# Patient Record
Sex: FEMALE | Race: WHITE | Employment: OTHER | RURAL
[De-identification: names, ages, dates, MRNs, and addresses within clinical notes are randomized per-mention and may not be internally consistent; named-entity substitution may affect disease eponyms.]

---

## 2017-01-16 RX ORDER — LEVOFLOXACIN 500 MG/1
TABLET, FILM COATED ORAL
Qty: 7 TABLET | Refills: 0 | Status: SHIPPED | OUTPATIENT
Start: 2017-01-16 | End: 2017-03-13

## 2017-03-13 ENCOUNTER — OFFICE VISIT (OUTPATIENT)
Dept: FAMILY MEDICINE CLINIC | Age: 82
End: 2017-03-13
Payer: MEDICARE

## 2017-03-13 VITALS
DIASTOLIC BLOOD PRESSURE: 64 MMHG | BODY MASS INDEX: 34.55 KG/M2 | HEIGHT: 60 IN | WEIGHT: 176 LBS | SYSTOLIC BLOOD PRESSURE: 120 MMHG

## 2017-03-13 DIAGNOSIS — L03.119 CELLULITIS OF UPPER ARM AND FOREARM: Primary | ICD-10-CM

## 2017-03-13 DIAGNOSIS — L29.9 ITCHING: ICD-10-CM

## 2017-03-13 PROCEDURE — 1036F TOBACCO NON-USER: CPT | Performed by: NURSE PRACTITIONER

## 2017-03-13 PROCEDURE — 4040F PNEUMOC VAC/ADMIN/RCVD: CPT | Performed by: NURSE PRACTITIONER

## 2017-03-13 PROCEDURE — G8484 FLU IMMUNIZE NO ADMIN: HCPCS | Performed by: NURSE PRACTITIONER

## 2017-03-13 PROCEDURE — 99213 OFFICE O/P EST LOW 20 MIN: CPT | Performed by: NURSE PRACTITIONER

## 2017-03-13 PROCEDURE — 1123F ACP DISCUSS/DSCN MKR DOCD: CPT | Performed by: NURSE PRACTITIONER

## 2017-03-13 PROCEDURE — 1090F PRES/ABSN URINE INCON ASSESS: CPT | Performed by: NURSE PRACTITIONER

## 2017-03-13 PROCEDURE — G8427 DOCREV CUR MEDS BY ELIG CLIN: HCPCS | Performed by: NURSE PRACTITIONER

## 2017-03-13 PROCEDURE — G8419 CALC BMI OUT NRM PARAM NOF/U: HCPCS | Performed by: NURSE PRACTITIONER

## 2017-03-13 PROCEDURE — 96372 THER/PROPH/DIAG INJ SC/IM: CPT | Performed by: NURSE PRACTITIONER

## 2017-03-13 RX ORDER — TRIAMCINOLONE ACETONIDE 40 MG/ML
80 INJECTION, SUSPENSION INTRA-ARTICULAR; INTRAMUSCULAR ONCE
Status: COMPLETED | OUTPATIENT
Start: 2017-03-13 | End: 2017-03-13

## 2017-03-13 RX ORDER — CEFTRIAXONE 1 G/1
1 INJECTION, POWDER, FOR SOLUTION INTRAMUSCULAR; INTRAVENOUS ONCE
Status: COMPLETED | OUTPATIENT
Start: 2017-03-13 | End: 2017-03-13

## 2017-03-13 RX ORDER — CEFUROXIME AXETIL 500 MG/1
500 TABLET ORAL 2 TIMES DAILY
Qty: 20 TABLET | Refills: 0 | Status: SHIPPED | OUTPATIENT
Start: 2017-03-13 | End: 2017-04-19 | Stop reason: SDUPTHER

## 2017-03-13 RX ADMIN — CEFTRIAXONE 1 G: 1 INJECTION, POWDER, FOR SOLUTION INTRAMUSCULAR; INTRAVENOUS at 11:15

## 2017-03-13 RX ADMIN — TRIAMCINOLONE ACETONIDE 80 MG: 40 INJECTION, SUSPENSION INTRA-ARTICULAR; INTRAMUSCULAR at 11:15

## 2017-03-13 ASSESSMENT — ENCOUNTER SYMPTOMS
ORTHOPNEA: 0
COUGH: 0
DIARRHEA: 0
SPUTUM PRODUCTION: 0
RESPIRATORY NEGATIVE: 1
SHORTNESS OF BREATH: 0
EYES NEGATIVE: 1
BACK PAIN: 0
NAIL CHANGES: 0
GASTROINTESTINAL NEGATIVE: 1
RHINORRHEA: 0
SORE THROAT: 0
HEMOPTYSIS: 0

## 2017-03-17 ENCOUNTER — OFFICE VISIT (OUTPATIENT)
Dept: FAMILY MEDICINE CLINIC | Age: 82
End: 2017-03-17
Payer: MEDICARE

## 2017-03-17 VITALS
SYSTOLIC BLOOD PRESSURE: 136 MMHG | BODY MASS INDEX: 35.14 KG/M2 | DIASTOLIC BLOOD PRESSURE: 70 MMHG | WEIGHT: 179 LBS | HEIGHT: 60 IN

## 2017-03-17 DIAGNOSIS — L85.3 DRY SKIN: ICD-10-CM

## 2017-03-17 DIAGNOSIS — L03.119 CELLULITIS OF UPPER EXTREMITY, UNSPECIFIED LATERALITY: Primary | ICD-10-CM

## 2017-03-17 PROCEDURE — 1090F PRES/ABSN URINE INCON ASSESS: CPT | Performed by: NURSE PRACTITIONER

## 2017-03-17 PROCEDURE — G8419 CALC BMI OUT NRM PARAM NOF/U: HCPCS | Performed by: NURSE PRACTITIONER

## 2017-03-17 PROCEDURE — G8427 DOCREV CUR MEDS BY ELIG CLIN: HCPCS | Performed by: NURSE PRACTITIONER

## 2017-03-17 PROCEDURE — 4040F PNEUMOC VAC/ADMIN/RCVD: CPT | Performed by: NURSE PRACTITIONER

## 2017-03-17 PROCEDURE — 99213 OFFICE O/P EST LOW 20 MIN: CPT | Performed by: NURSE PRACTITIONER

## 2017-03-17 PROCEDURE — 1123F ACP DISCUSS/DSCN MKR DOCD: CPT | Performed by: NURSE PRACTITIONER

## 2017-03-17 PROCEDURE — G8484 FLU IMMUNIZE NO ADMIN: HCPCS | Performed by: NURSE PRACTITIONER

## 2017-03-17 PROCEDURE — 1036F TOBACCO NON-USER: CPT | Performed by: NURSE PRACTITIONER

## 2017-03-17 ASSESSMENT — ENCOUNTER SYMPTOMS
BACK PAIN: 0
COUGH: 0
GASTROINTESTINAL NEGATIVE: 1
SPUTUM PRODUCTION: 0
RESPIRATORY NEGATIVE: 1
EYES NEGATIVE: 1
HEMOPTYSIS: 0
ORTHOPNEA: 0

## 2017-04-19 ENCOUNTER — OFFICE VISIT (OUTPATIENT)
Dept: FAMILY MEDICINE CLINIC | Age: 82
End: 2017-04-19
Payer: MEDICARE

## 2017-04-19 VITALS
DIASTOLIC BLOOD PRESSURE: 60 MMHG | WEIGHT: 177 LBS | HEIGHT: 60 IN | BODY MASS INDEX: 34.75 KG/M2 | SYSTOLIC BLOOD PRESSURE: 110 MMHG

## 2017-04-19 DIAGNOSIS — L08.9 INFECTED SKIN TEAR: Primary | ICD-10-CM

## 2017-04-19 DIAGNOSIS — T14.8XXA INFECTED SKIN TEAR: Primary | ICD-10-CM

## 2017-04-19 PROCEDURE — 4040F PNEUMOC VAC/ADMIN/RCVD: CPT | Performed by: NURSE PRACTITIONER

## 2017-04-19 PROCEDURE — 1090F PRES/ABSN URINE INCON ASSESS: CPT | Performed by: NURSE PRACTITIONER

## 2017-04-19 PROCEDURE — 1123F ACP DISCUSS/DSCN MKR DOCD: CPT | Performed by: NURSE PRACTITIONER

## 2017-04-19 PROCEDURE — G8427 DOCREV CUR MEDS BY ELIG CLIN: HCPCS | Performed by: NURSE PRACTITIONER

## 2017-04-19 PROCEDURE — G8419 CALC BMI OUT NRM PARAM NOF/U: HCPCS | Performed by: NURSE PRACTITIONER

## 2017-04-19 PROCEDURE — 1036F TOBACCO NON-USER: CPT | Performed by: NURSE PRACTITIONER

## 2017-04-19 PROCEDURE — 99213 OFFICE O/P EST LOW 20 MIN: CPT | Performed by: NURSE PRACTITIONER

## 2017-04-19 RX ORDER — CEFUROXIME AXETIL 500 MG/1
500 TABLET ORAL 2 TIMES DAILY
Qty: 20 TABLET | Refills: 0 | Status: SHIPPED | OUTPATIENT
Start: 2017-04-19 | End: 2017-04-29

## 2017-04-19 ASSESSMENT — ENCOUNTER SYMPTOMS
COUGH: 0
HEMOPTYSIS: 0
GASTROINTESTINAL NEGATIVE: 1
SPUTUM PRODUCTION: 0
EYES NEGATIVE: 1
RESPIRATORY NEGATIVE: 1
BACK PAIN: 0
ORTHOPNEA: 0

## 2017-05-12 ENCOUNTER — TELEPHONE (OUTPATIENT)
Dept: NEUROLOGY | Age: 82
End: 2017-05-12

## 2017-05-24 ENCOUNTER — CLINICAL SUPPORT (OUTPATIENT)
Dept: CARDIOLOGY | Facility: CLINIC | Age: 82
End: 2017-05-24

## 2017-05-24 ENCOUNTER — OFFICE VISIT (OUTPATIENT)
Dept: CARDIOLOGY | Facility: CLINIC | Age: 82
End: 2017-05-24

## 2017-05-24 VITALS
DIASTOLIC BLOOD PRESSURE: 75 MMHG | SYSTOLIC BLOOD PRESSURE: 125 MMHG | BODY MASS INDEX: 32.94 KG/M2 | HEIGHT: 62 IN | RESPIRATION RATE: 18 BRPM | WEIGHT: 179 LBS | HEART RATE: 85 BPM

## 2017-05-24 DIAGNOSIS — I10 ESSENTIAL HYPERTENSION: ICD-10-CM

## 2017-05-24 DIAGNOSIS — Z95.0 CARDIAC PACEMAKER IN SITU: Primary | ICD-10-CM

## 2017-05-24 DIAGNOSIS — I49.5 SSS (SICK SINUS SYNDROME) (HCC): ICD-10-CM

## 2017-05-24 DIAGNOSIS — I25.10 CORONARY ARTERY DISEASE INVOLVING NATIVE CORONARY ARTERY OF NATIVE HEART WITHOUT ANGINA PECTORIS: ICD-10-CM

## 2017-05-24 DIAGNOSIS — R60.0 LOWER LEG EDEMA: ICD-10-CM

## 2017-05-24 DIAGNOSIS — I48.0 PAROXYSMAL ATRIAL FIBRILLATION (HCC): Primary | ICD-10-CM

## 2017-05-24 PROCEDURE — 99214 OFFICE O/P EST MOD 30 MIN: CPT | Performed by: NURSE PRACTITIONER

## 2017-05-24 PROCEDURE — 93288 INTERROG EVL PM/LDLS PM IP: CPT | Performed by: INTERNAL MEDICINE

## 2017-05-24 PROCEDURE — 93000 ELECTROCARDIOGRAM COMPLETE: CPT | Performed by: NURSE PRACTITIONER

## 2017-05-24 RX ORDER — AMITRIPTYLINE HYDROCHLORIDE 10 MG/1
10 TABLET, FILM COATED ORAL NIGHTLY
COMMUNITY
End: 2018-05-24

## 2017-06-12 RX ORDER — AMLODIPINE BESYLATE 10 MG/1
TABLET ORAL
Qty: 30 TABLET | Refills: 5 | Status: SHIPPED | OUTPATIENT
Start: 2017-06-12 | End: 2017-06-28 | Stop reason: SDUPTHER

## 2017-06-15 ENCOUNTER — OFFICE VISIT (OUTPATIENT)
Dept: FAMILY MEDICINE CLINIC | Age: 82
End: 2017-06-15
Payer: MEDICARE

## 2017-06-15 VITALS
SYSTOLIC BLOOD PRESSURE: 110 MMHG | WEIGHT: 175 LBS | DIASTOLIC BLOOD PRESSURE: 60 MMHG | HEIGHT: 60 IN | BODY MASS INDEX: 34.36 KG/M2

## 2017-06-15 DIAGNOSIS — S39.012A LOW BACK STRAIN, INITIAL ENCOUNTER: Primary | ICD-10-CM

## 2017-06-15 PROCEDURE — 1090F PRES/ABSN URINE INCON ASSESS: CPT | Performed by: NURSE PRACTITIONER

## 2017-06-15 PROCEDURE — 1123F ACP DISCUSS/DSCN MKR DOCD: CPT | Performed by: NURSE PRACTITIONER

## 2017-06-15 PROCEDURE — 4040F PNEUMOC VAC/ADMIN/RCVD: CPT | Performed by: NURSE PRACTITIONER

## 2017-06-15 PROCEDURE — G8419 CALC BMI OUT NRM PARAM NOF/U: HCPCS | Performed by: NURSE PRACTITIONER

## 2017-06-15 PROCEDURE — G8427 DOCREV CUR MEDS BY ELIG CLIN: HCPCS | Performed by: NURSE PRACTITIONER

## 2017-06-15 PROCEDURE — 99213 OFFICE O/P EST LOW 20 MIN: CPT | Performed by: NURSE PRACTITIONER

## 2017-06-15 PROCEDURE — 1036F TOBACCO NON-USER: CPT | Performed by: NURSE PRACTITIONER

## 2017-06-15 PROCEDURE — 96372 THER/PROPH/DIAG INJ SC/IM: CPT | Performed by: NURSE PRACTITIONER

## 2017-06-15 RX ORDER — ACETAMINOPHEN 325 MG/1
650 TABLET ORAL EVERY 4 HOURS PRN
Qty: 120 TABLET | Refills: 3 | Status: SHIPPED | OUTPATIENT
Start: 2017-06-15 | End: 2021-01-01 | Stop reason: CLARIF

## 2017-06-15 RX ORDER — TRIAMCINOLONE ACETONIDE 40 MG/ML
80 INJECTION, SUSPENSION INTRA-ARTICULAR; INTRAMUSCULAR ONCE
Status: COMPLETED | OUTPATIENT
Start: 2017-06-15 | End: 2017-06-15

## 2017-06-15 RX ORDER — TIZANIDINE 4 MG/1
4 TABLET ORAL 3 TIMES DAILY PRN
Qty: 60 TABLET | Refills: 1 | Status: SHIPPED | OUTPATIENT
Start: 2017-06-15 | End: 2017-09-05 | Stop reason: ALTCHOICE

## 2017-06-15 RX ADMIN — TRIAMCINOLONE ACETONIDE 80 MG: 40 INJECTION, SUSPENSION INTRA-ARTICULAR; INTRAMUSCULAR at 15:46

## 2017-06-15 ASSESSMENT — ENCOUNTER SYMPTOMS
BOWEL INCONTINENCE: 0
SPUTUM PRODUCTION: 0
ORTHOPNEA: 0
GASTROINTESTINAL NEGATIVE: 1
EYES NEGATIVE: 1
HEMOPTYSIS: 0
COUGH: 0
BACK PAIN: 1
RESPIRATORY NEGATIVE: 1

## 2017-06-15 ASSESSMENT — PATIENT HEALTH QUESTIONNAIRE - PHQ9
SUM OF ALL RESPONSES TO PHQ QUESTIONS 1-9: 0
2. FEELING DOWN, DEPRESSED OR HOPELESS: 0
SUM OF ALL RESPONSES TO PHQ9 QUESTIONS 1 & 2: 0
1. LITTLE INTEREST OR PLEASURE IN DOING THINGS: 0

## 2017-06-27 RX ORDER — AMLODIPINE BESYLATE 10 MG/1
TABLET ORAL
Qty: 30 TABLET | Refills: 5 | OUTPATIENT
Start: 2017-06-27

## 2017-06-28 RX ORDER — AMLODIPINE BESYLATE 10 MG/1
TABLET ORAL
Qty: 30 TABLET | Refills: 5 | Status: SHIPPED | OUTPATIENT
Start: 2017-06-28 | End: 2017-12-15 | Stop reason: SDUPTHER

## 2017-07-19 ENCOUNTER — OFFICE VISIT (OUTPATIENT)
Dept: URGENT CARE | Age: 82
End: 2017-07-19
Payer: MEDICARE

## 2017-07-19 VITALS
OXYGEN SATURATION: 97 % | HEART RATE: 70 BPM | HEIGHT: 62 IN | SYSTOLIC BLOOD PRESSURE: 133 MMHG | DIASTOLIC BLOOD PRESSURE: 73 MMHG | WEIGHT: 183 LBS | BODY MASS INDEX: 33.68 KG/M2 | TEMPERATURE: 96.8 F

## 2017-07-19 DIAGNOSIS — R23.8 BULLAE: ICD-10-CM

## 2017-07-19 DIAGNOSIS — R60.0 LEG EDEMA, LEFT: Primary | ICD-10-CM

## 2017-07-19 LAB
BASOPHILS ABSOLUTE: 0 K/UL (ref 0–0.2)
BASOPHILS RELATIVE PERCENT: 0.3 % (ref 0–1)
EOSINOPHILS ABSOLUTE: 0.1 K/UL (ref 0–0.6)
EOSINOPHILS RELATIVE PERCENT: 1 % (ref 0–5)
HCT VFR BLD CALC: 37.7 % (ref 37–47)
HEMOGLOBIN: 12.1 G/DL (ref 12–16)
LYMPHOCYTES ABSOLUTE: 1.7 K/UL (ref 1.1–4.5)
LYMPHOCYTES RELATIVE PERCENT: 22.7 % (ref 20–40)
MCH RBC QN AUTO: 30.6 PG (ref 27–31)
MCHC RBC AUTO-ENTMCNC: 32.1 G/DL (ref 33–37)
MCV RBC AUTO: 95.4 FL (ref 81–99)
MONOCYTES ABSOLUTE: 0.6 K/UL (ref 0–0.9)
MONOCYTES RELATIVE PERCENT: 8 % (ref 0–10)
NEUTROPHILS ABSOLUTE: 4.9 K/UL (ref 1.5–7.5)
NEUTROPHILS RELATIVE PERCENT: 66.9 % (ref 50–65)
PDW BLD-RTO: 14.6 % (ref 11.5–14.5)
PLATELET # BLD: 148 K/UL (ref 130–400)
PMV BLD AUTO: 9.6 FL (ref 9.4–12.3)
RBC # BLD: 3.95 M/UL (ref 4.2–5.4)
WBC # BLD: 7.4 K/UL (ref 4.8–10.8)

## 2017-07-19 PROCEDURE — G8427 DOCREV CUR MEDS BY ELIG CLIN: HCPCS | Performed by: PHYSICIAN ASSISTANT

## 2017-07-19 PROCEDURE — 1036F TOBACCO NON-USER: CPT | Performed by: PHYSICIAN ASSISTANT

## 2017-07-19 PROCEDURE — G8417 CALC BMI ABV UP PARAM F/U: HCPCS | Performed by: PHYSICIAN ASSISTANT

## 2017-07-19 PROCEDURE — 99213 OFFICE O/P EST LOW 20 MIN: CPT | Performed by: PHYSICIAN ASSISTANT

## 2017-07-19 PROCEDURE — 4040F PNEUMOC VAC/ADMIN/RCVD: CPT | Performed by: PHYSICIAN ASSISTANT

## 2017-07-19 PROCEDURE — 1123F ACP DISCUSS/DSCN MKR DOCD: CPT | Performed by: PHYSICIAN ASSISTANT

## 2017-07-19 PROCEDURE — 1090F PRES/ABSN URINE INCON ASSESS: CPT | Performed by: PHYSICIAN ASSISTANT

## 2017-07-19 PROCEDURE — 36415 COLL VENOUS BLD VENIPUNCTURE: CPT | Performed by: PHYSICIAN ASSISTANT

## 2017-07-19 RX ORDER — CEPHALEXIN 500 MG/1
500 CAPSULE ORAL 4 TIMES DAILY
Qty: 40 CAPSULE | Refills: 0 | Status: SHIPPED | OUTPATIENT
Start: 2017-07-19 | End: 2017-07-29

## 2017-07-19 ASSESSMENT — ENCOUNTER SYMPTOMS: COLOR CHANGE: 1

## 2017-07-25 ENCOUNTER — HOSPITAL ENCOUNTER (OUTPATIENT)
Dept: WOUND CARE | Age: 82
Discharge: HOME | End: 2017-07-25
Payer: MEDICARE

## 2017-07-25 VITALS
BODY MASS INDEX: 33.68 KG/M2 | WEIGHT: 183 LBS | TEMPERATURE: 97.6 F | RESPIRATION RATE: 20 BRPM | DIASTOLIC BLOOD PRESSURE: 63 MMHG | SYSTOLIC BLOOD PRESSURE: 173 MMHG | HEART RATE: 67 BPM | HEIGHT: 62 IN

## 2017-07-25 DIAGNOSIS — I25.84 CORONARY ARTERY DISEASE DUE TO CALCIFIED CORONARY LESION: Chronic | ICD-10-CM

## 2017-07-25 DIAGNOSIS — R60.0 FLUID COLLECTION (EDEMA) IN THE ARMS, LEGS, HANDS AND FEET: Chronic | ICD-10-CM

## 2017-07-25 DIAGNOSIS — I25.10 CORONARY ARTERY DISEASE DUE TO CALCIFIED CORONARY LESION: Chronic | ICD-10-CM

## 2017-07-25 DIAGNOSIS — L97.921 CHRONIC ULCER OF LEFT LEG, LIMITED TO BREAKDOWN OF SKIN (HCC): Chronic | ICD-10-CM

## 2017-07-25 DIAGNOSIS — I50.22 CHRONIC SYSTOLIC CONGESTIVE HEART FAILURE (HCC): Chronic | ICD-10-CM

## 2017-07-25 DIAGNOSIS — S80.822A BLISTER (NONTHERMAL), LEFT LOWER LEG, INITIAL ENCOUNTER: Chronic | ICD-10-CM

## 2017-07-25 PROCEDURE — 10060 I&D ABSCESS SIMPLE/SINGLE: CPT | Performed by: SURGERY

## 2017-07-25 PROCEDURE — 99203 OFFICE O/P NEW LOW 30 MIN: CPT | Performed by: SURGERY

## 2017-07-25 PROCEDURE — 99213 OFFICE O/P EST LOW 20 MIN: CPT

## 2017-07-25 PROCEDURE — 10060 I&D ABSCESS SIMPLE/SINGLE: CPT

## 2017-07-25 RX ORDER — FUROSEMIDE 40 MG/1
40 TABLET ORAL 2 TIMES DAILY
COMMUNITY
End: 2017-10-06 | Stop reason: SDUPTHER

## 2017-08-01 ENCOUNTER — HOSPITAL ENCOUNTER (OUTPATIENT)
Dept: NON INVASIVE DIAGNOSTICS | Age: 82
Discharge: HOME | End: 2017-08-01
Payer: MEDICARE

## 2017-08-01 ENCOUNTER — HOSPITAL ENCOUNTER (OUTPATIENT)
Dept: WOUND CARE | Age: 82
Discharge: HOME | End: 2017-08-01
Payer: MEDICARE

## 2017-08-01 VITALS
HEIGHT: 62 IN | SYSTOLIC BLOOD PRESSURE: 173 MMHG | HEART RATE: 62 BPM | TEMPERATURE: 97.5 F | DIASTOLIC BLOOD PRESSURE: 73 MMHG | WEIGHT: 177 LBS | BODY MASS INDEX: 32.57 KG/M2

## 2017-08-01 DIAGNOSIS — L97.921 CHRONIC ULCER OF LEFT LEG, LIMITED TO BREAKDOWN OF SKIN (HCC): Chronic | ICD-10-CM

## 2017-08-01 PROCEDURE — 93923 UPR/LXTR ART STDY 3+ LVLS: CPT

## 2017-08-01 PROCEDURE — 99213 OFFICE O/P EST LOW 20 MIN: CPT

## 2017-08-01 PROCEDURE — 99212 OFFICE O/P EST SF 10 MIN: CPT | Performed by: SURGERY

## 2017-08-01 PROCEDURE — G0463 HOSPITAL OUTPT CLINIC VISIT: HCPCS

## 2017-08-08 ENCOUNTER — HOSPITAL ENCOUNTER (OUTPATIENT)
Dept: WOUND CARE | Age: 82
Discharge: HOME | End: 2017-08-08
Payer: MEDICARE

## 2017-08-08 VITALS
RESPIRATION RATE: 16 BRPM | HEIGHT: 62 IN | SYSTOLIC BLOOD PRESSURE: 132 MMHG | DIASTOLIC BLOOD PRESSURE: 68 MMHG | BODY MASS INDEX: 32.57 KG/M2 | HEART RATE: 66 BPM | WEIGHT: 177 LBS | TEMPERATURE: 97.2 F

## 2017-08-08 PROCEDURE — 99213 OFFICE O/P EST LOW 20 MIN: CPT

## 2017-08-08 PROCEDURE — 99212 OFFICE O/P EST SF 10 MIN: CPT | Performed by: SURGERY

## 2017-08-15 ENCOUNTER — HOSPITAL ENCOUNTER (OUTPATIENT)
Dept: WOUND CARE | Age: 82
Discharge: HOME | End: 2017-08-15
Payer: MEDICARE

## 2017-08-15 VITALS
HEART RATE: 65 BPM | WEIGHT: 177 LBS | HEIGHT: 62 IN | TEMPERATURE: 97.4 F | SYSTOLIC BLOOD PRESSURE: 130 MMHG | BODY MASS INDEX: 32.57 KG/M2 | DIASTOLIC BLOOD PRESSURE: 65 MMHG | RESPIRATION RATE: 16 BRPM

## 2017-08-15 PROCEDURE — 99212 OFFICE O/P EST SF 10 MIN: CPT

## 2017-08-15 PROCEDURE — 99211 OFF/OP EST MAY X REQ PHY/QHP: CPT | Performed by: SURGERY

## 2017-08-15 ASSESSMENT — PAIN SCALES - GENERAL: PAINLEVEL_OUTOF10: 0

## 2017-08-18 RX ORDER — BLOOD-GLUCOSE METER
KIT MISCELLANEOUS
Qty: 30 STRIP | Refills: 11 | Status: SHIPPED | OUTPATIENT
Start: 2017-08-18 | End: 2017-08-23 | Stop reason: SDUPTHER

## 2017-08-23 RX ORDER — BLOOD-GLUCOSE METER
KIT MISCELLANEOUS
Refills: 11 | OUTPATIENT
Start: 2017-08-23

## 2017-08-29 ENCOUNTER — HOSPITAL ENCOUNTER (OUTPATIENT)
Dept: WOUND CARE | Age: 82
Discharge: HOME | End: 2017-08-29
Payer: MEDICARE

## 2017-08-29 VITALS
HEIGHT: 62 IN | HEART RATE: 70 BPM | DIASTOLIC BLOOD PRESSURE: 67 MMHG | TEMPERATURE: 98.2 F | WEIGHT: 177 LBS | RESPIRATION RATE: 18 BRPM | SYSTOLIC BLOOD PRESSURE: 150 MMHG | BODY MASS INDEX: 32.57 KG/M2

## 2017-08-29 DIAGNOSIS — E66.9 OBESITY (BMI 30-39.9): Chronic | ICD-10-CM

## 2017-08-29 PROCEDURE — 99211 OFF/OP EST MAY X REQ PHY/QHP: CPT | Performed by: SURGERY

## 2017-08-29 PROCEDURE — 99212 OFFICE O/P EST SF 10 MIN: CPT

## 2017-08-29 ASSESSMENT — PAIN SCALES - GENERAL: PAINLEVEL_OUTOF10: 0

## 2017-09-05 ENCOUNTER — OFFICE VISIT (OUTPATIENT)
Dept: INTERNAL MEDICINE | Age: 82
End: 2017-09-05
Payer: MEDICARE

## 2017-09-05 VITALS
OXYGEN SATURATION: 97 % | TEMPERATURE: 98.3 F | HEIGHT: 62 IN | BODY MASS INDEX: 32.94 KG/M2 | HEART RATE: 64 BPM | WEIGHT: 179 LBS | SYSTOLIC BLOOD PRESSURE: 130 MMHG | DIASTOLIC BLOOD PRESSURE: 80 MMHG

## 2017-09-05 DIAGNOSIS — I50.22 CHRONIC SYSTOLIC CONGESTIVE HEART FAILURE (HCC): Chronic | ICD-10-CM

## 2017-09-05 DIAGNOSIS — R60.0 FLUID COLLECTION (EDEMA) IN THE ARMS, LEGS, HANDS AND FEET: Chronic | ICD-10-CM

## 2017-09-05 DIAGNOSIS — E13.9 OTHER SPECIFIED DIABETES MELLITUS: ICD-10-CM

## 2017-09-05 DIAGNOSIS — R60.9 PITTING EDEMA: Primary | ICD-10-CM

## 2017-09-05 LAB
ANION GAP SERPL CALCULATED.3IONS-SCNC: 16 MMOL/L (ref 7–19)
BUN BLDV-MCNC: 20 MG/DL (ref 8–23)
CALCIUM SERPL-MCNC: 8.7 MG/DL (ref 8.2–9.6)
CHLORIDE BLD-SCNC: 103 MMOL/L (ref 98–111)
CO2: 25 MMOL/L (ref 22–29)
CREAT SERPL-MCNC: 1.3 MG/DL (ref 0.5–0.9)
GFR NON-AFRICAN AMERICAN: 38
GLUCOSE BLD-MCNC: 92 MG/DL (ref 74–109)
POTASSIUM SERPL-SCNC: 3.7 MMOL/L (ref 3.5–5)
SODIUM BLD-SCNC: 144 MMOL/L (ref 136–145)

## 2017-09-05 PROCEDURE — 1090F PRES/ABSN URINE INCON ASSESS: CPT | Performed by: NURSE PRACTITIONER

## 2017-09-05 PROCEDURE — G8427 DOCREV CUR MEDS BY ELIG CLIN: HCPCS | Performed by: NURSE PRACTITIONER

## 2017-09-05 PROCEDURE — 1123F ACP DISCUSS/DSCN MKR DOCD: CPT | Performed by: NURSE PRACTITIONER

## 2017-09-05 PROCEDURE — 1036F TOBACCO NON-USER: CPT | Performed by: NURSE PRACTITIONER

## 2017-09-05 PROCEDURE — 4040F PNEUMOC VAC/ADMIN/RCVD: CPT | Performed by: NURSE PRACTITIONER

## 2017-09-05 PROCEDURE — 96372 THER/PROPH/DIAG INJ SC/IM: CPT | Performed by: NURSE PRACTITIONER

## 2017-09-05 PROCEDURE — G8598 ASA/ANTIPLAT THER USED: HCPCS | Performed by: NURSE PRACTITIONER

## 2017-09-05 PROCEDURE — 99214 OFFICE O/P EST MOD 30 MIN: CPT | Performed by: NURSE PRACTITIONER

## 2017-09-05 PROCEDURE — G8417 CALC BMI ABV UP PARAM F/U: HCPCS | Performed by: NURSE PRACTITIONER

## 2017-09-05 RX ORDER — FUROSEMIDE 10 MG/ML
40 INJECTION INTRAMUSCULAR; INTRAVENOUS ONCE
Status: COMPLETED | OUTPATIENT
Start: 2017-09-05 | End: 2017-09-05

## 2017-09-05 RX ORDER — OLMESARTAN MEDOXOMIL 40 MG/1
40 TABLET ORAL DAILY
COMMUNITY
End: 2017-12-27 | Stop reason: SDUPTHER

## 2017-09-05 RX ADMIN — FUROSEMIDE 40 MG: 10 INJECTION INTRAMUSCULAR; INTRAVENOUS at 14:25

## 2017-09-05 RX ADMIN — CLONIDINE HYDROCHLORIDE 0.1 MG: 0.1 TABLET ORAL at 14:27

## 2017-09-05 ASSESSMENT — ENCOUNTER SYMPTOMS
BACK PAIN: 0
RHINORRHEA: 0
COUGH: 0
VOICE CHANGE: 0
VOMITING: 0
SHORTNESS OF BREATH: 0
PHOTOPHOBIA: 0
NAUSEA: 0
COLOR CHANGE: 0

## 2017-09-11 ENCOUNTER — TELEPHONE (OUTPATIENT)
Dept: INTERNAL MEDICINE | Age: 82
End: 2017-09-11

## 2017-09-11 RX ORDER — RIVAROXABAN 15 MG/1
TABLET, FILM COATED ORAL
Qty: 30 TABLET | Refills: 4 | Status: SHIPPED | OUTPATIENT
Start: 2017-09-11 | End: 2018-01-19 | Stop reason: SDUPTHER

## 2017-09-11 RX ORDER — LEVOTHYROXINE SODIUM 0.05 MG/1
TABLET ORAL
Qty: 30 TABLET | Refills: 5 | Status: SHIPPED | OUTPATIENT
Start: 2017-09-11 | End: 2018-02-19 | Stop reason: SDUPTHER

## 2017-10-03 RX ORDER — RANOLAZINE 500 MG/1
TABLET, FILM COATED, EXTENDED RELEASE ORAL
Qty: 60 TABLET | Refills: 5 | Status: SHIPPED | OUTPATIENT
Start: 2017-10-03 | End: 2018-03-06 | Stop reason: SDUPTHER

## 2017-10-03 NOTE — TELEPHONE ENCOUNTER
Leonila called requesting a refill of the below medication which has been pended for you:     Requested Prescriptions     Pending Prescriptions Disp Refills    RANEXA 500 MG extended release tablet [Pharmacy Med Name: RANEXA 500MG TB12] 60 tablet 5     Sig: TAKE ONE TABLET BY MOUTH TWICE A DAY       Last Appointment Date: Visit date not found  Next Appointment Date: 11/6/2017    No Known Allergies

## 2017-10-06 RX ORDER — FUROSEMIDE 40 MG/1
TABLET ORAL
Qty: 45 TABLET | Refills: 11 | Status: SHIPPED | OUTPATIENT
Start: 2017-10-06 | End: 2018-03-06 | Stop reason: SDUPTHER

## 2017-10-11 RX ORDER — SIMVASTATIN 40 MG
TABLET ORAL
Qty: 30 TABLET | Refills: 5 | Status: SHIPPED | OUTPATIENT
Start: 2017-10-11 | End: 2018-03-06 | Stop reason: SDUPTHER

## 2017-10-21 PROBLEM — E66.09 EXOGENOUS OBESITY: Status: ACTIVE | Noted: 2017-10-21

## 2017-10-21 PROBLEM — I50.32 CHRONIC DIASTOLIC CONGESTIVE HEART FAILURE (HCC): Status: ACTIVE | Noted: 2017-10-21

## 2017-10-21 PROBLEM — I34.0 MILD MITRAL REGURGITATION: Status: ACTIVE | Noted: 2017-10-21

## 2017-10-21 PROBLEM — I35.1 MILD AORTIC REGURGITATION: Status: ACTIVE | Noted: 2017-10-21

## 2017-10-21 PROBLEM — E03.9 ACQUIRED HYPOTHYROIDISM: Status: ACTIVE | Noted: 2017-10-21

## 2017-10-21 PROBLEM — E11.42 TYPE 2 DIABETES MELLITUS WITH DIABETIC POLYNEUROPATHY, WITHOUT LONG-TERM CURRENT USE OF INSULIN (HCC): Status: ACTIVE | Noted: 2017-10-21

## 2017-10-27 ENCOUNTER — TELEPHONE (OUTPATIENT)
Dept: INTERNAL MEDICINE | Age: 82
End: 2017-10-27

## 2017-10-27 DIAGNOSIS — E11.42 TYPE 2 DIABETES MELLITUS WITH DIABETIC POLYNEUROPATHY, WITHOUT LONG-TERM CURRENT USE OF INSULIN (HCC): ICD-10-CM

## 2017-10-27 DIAGNOSIS — E03.9 ACQUIRED HYPOTHYROIDISM: ICD-10-CM

## 2017-10-27 DIAGNOSIS — I10 ESSENTIAL HYPERTENSION: Primary | ICD-10-CM

## 2017-11-06 ENCOUNTER — OFFICE VISIT (OUTPATIENT)
Dept: INTERNAL MEDICINE | Age: 82
End: 2017-11-06
Payer: MEDICARE

## 2017-11-06 VITALS
WEIGHT: 167 LBS | HEART RATE: 72 BPM | DIASTOLIC BLOOD PRESSURE: 46 MMHG | SYSTOLIC BLOOD PRESSURE: 122 MMHG | RESPIRATION RATE: 18 BRPM | HEIGHT: 62 IN | BODY MASS INDEX: 30.73 KG/M2

## 2017-11-06 DIAGNOSIS — I50.32 CHRONIC DIASTOLIC CONGESTIVE HEART FAILURE (HCC): ICD-10-CM

## 2017-11-06 DIAGNOSIS — E03.9 ACQUIRED HYPOTHYROIDISM: ICD-10-CM

## 2017-11-06 DIAGNOSIS — I10 ESSENTIAL HYPERTENSION: ICD-10-CM

## 2017-11-06 DIAGNOSIS — E11.42 TYPE 2 DIABETES MELLITUS WITH DIABETIC POLYNEUROPATHY, WITHOUT LONG-TERM CURRENT USE OF INSULIN (HCC): Primary | ICD-10-CM

## 2017-11-06 DIAGNOSIS — Z99.89 OSA ON CPAP: ICD-10-CM

## 2017-11-06 DIAGNOSIS — E11.42 TYPE 2 DIABETES MELLITUS WITH DIABETIC POLYNEUROPATHY, WITHOUT LONG-TERM CURRENT USE OF INSULIN (HCC): ICD-10-CM

## 2017-11-06 DIAGNOSIS — E78.00 PURE HYPERCHOLESTEROLEMIA: ICD-10-CM

## 2017-11-06 DIAGNOSIS — G47.33 OSA ON CPAP: ICD-10-CM

## 2017-11-06 LAB
ALBUMIN SERPL-MCNC: 3.8 G/DL (ref 3.5–5.2)
ALP BLD-CCNC: 63 U/L (ref 35–104)
ALT SERPL-CCNC: 17 U/L (ref 5–33)
ANION GAP SERPL CALCULATED.3IONS-SCNC: 18 MMOL/L (ref 7–19)
AST SERPL-CCNC: 16 U/L (ref 5–32)
BILIRUB SERPL-MCNC: 0.6 MG/DL (ref 0.2–1.2)
BUN BLDV-MCNC: 19 MG/DL (ref 8–23)
CALCIUM SERPL-MCNC: 8.9 MG/DL (ref 8.2–9.6)
CHLORIDE BLD-SCNC: 101 MMOL/L (ref 98–111)
CHOLESTEROL, TOTAL: 151 MG/DL (ref 160–199)
CO2: 25 MMOL/L (ref 22–29)
CREAT SERPL-MCNC: 1.4 MG/DL (ref 0.5–0.9)
GFR NON-AFRICAN AMERICAN: 35
GLUCOSE BLD-MCNC: 117 MG/DL (ref 74–109)
HBA1C MFR BLD: 5.8 %
HDLC SERPL-MCNC: 72 MG/DL (ref 65–121)
LDL CHOLESTEROL CALCULATED: 65 MG/DL
POTASSIUM SERPL-SCNC: 3.9 MMOL/L (ref 3.5–5)
SODIUM BLD-SCNC: 144 MMOL/L (ref 136–145)
T4 FREE: 1.4 NG/DL (ref 0.9–1.7)
TOTAL PROTEIN: 6.6 G/DL (ref 6.6–8.7)
TRIGL SERPL-MCNC: 72 MG/DL (ref 0–149)
TSH SERPL DL<=0.05 MIU/L-ACNC: 3.16 UIU/ML (ref 0.27–4.2)

## 2017-11-06 PROCEDURE — G8417 CALC BMI ABV UP PARAM F/U: HCPCS | Performed by: INTERNAL MEDICINE

## 2017-11-06 PROCEDURE — 1036F TOBACCO NON-USER: CPT | Performed by: INTERNAL MEDICINE

## 2017-11-06 PROCEDURE — G8427 DOCREV CUR MEDS BY ELIG CLIN: HCPCS | Performed by: INTERNAL MEDICINE

## 2017-11-06 PROCEDURE — G8484 FLU IMMUNIZE NO ADMIN: HCPCS | Performed by: INTERNAL MEDICINE

## 2017-11-06 PROCEDURE — 99214 OFFICE O/P EST MOD 30 MIN: CPT | Performed by: INTERNAL MEDICINE

## 2017-11-06 PROCEDURE — G8598 ASA/ANTIPLAT THER USED: HCPCS | Performed by: INTERNAL MEDICINE

## 2017-11-06 PROCEDURE — 1090F PRES/ABSN URINE INCON ASSESS: CPT | Performed by: INTERNAL MEDICINE

## 2017-11-06 PROCEDURE — 4040F PNEUMOC VAC/ADMIN/RCVD: CPT | Performed by: INTERNAL MEDICINE

## 2017-11-06 PROCEDURE — 1123F ACP DISCUSS/DSCN MKR DOCD: CPT | Performed by: INTERNAL MEDICINE

## 2017-11-06 ASSESSMENT — ENCOUNTER SYMPTOMS
COUGH: 0
ABDOMINAL PAIN: 0
CONSTIPATION: 0
CHEST TIGHTNESS: 0
SORE THROAT: 0
WHEEZING: 0

## 2017-11-06 NOTE — PROGRESS NOTES
(continuous positive airway pressure) dependence     9cm    DM (diabetes mellitus screen)     Hyperlipidemia     Hypertension     Hyperthyroidism     Left carotid bruit     Macular degeneration     Obstructive sleep apnea     AHI:  15.3      Past Surgical History:   Procedure Laterality Date    APPENDECTOMY      BLADDER SURGERY      CHOLECYSTECTOMY      PACEMAKER PLACEMENT      PAT AND BSO       Current Outpatient Prescriptions   Medication Sig Dispense Refill    simvastatin (ZOCOR) 40 MG tablet TAKE ONE TABLET BY MOUTH EVERY DAY 30 tablet 5    furosemide (LASIX) 40 MG tablet TAKE ONE TABLET BY MOUTH EVERY DAY AND EXTRA TABLET TWICE WEEKLY AS NEEDED 45 tablet 11    RANEXA 500 MG extended release tablet TAKE ONE TABLET BY MOUTH TWICE A DAY 60 tablet 5    XARELTO 15 MG TABS tablet TAKE ONE TABLET BY MOUTH EVERY DAY 30 tablet 4    levothyroxine (SYNTHROID) 50 MCG tablet TAKE ONE TABLET BY MOUTH EVERY DAY 30 tablet 5    olmesartan (BENICAR) 40 MG tablet Take 40 mg by mouth daily      glucose blood VI test strips (FREESTYLE LITE) strip USE ONE STRIP ONCE DAILY 50 strip 11    amLODIPine (NORVASC) 10 MG tablet TAKE ONE TABLET BY MOUTH EVERY DAY 30 tablet 5    acetaminophen (AMINOFEN) 325 MG tablet Take 2 tablets by mouth every 4 hours as needed for Pain 120 tablet 3    amitriptyline (ELAVIL) 25 MG tablet Take 25 mg by mouth nightly      dronedarone hcl (MULTAQ) 400 MG TABS Take 400 mg by mouth 2 times daily (with meals).  potassium chloride SA (K-DUR;KLOR-CON M) 20 MEQ tablet Take 20 mEq by mouth 2 times daily.  rivaroxaban (XARELTO) 20 MG TABS tablet Take 20 mg by mouth Daily. No current facility-administered medications for this visit. No Known Allergies  Social History   Substance Use Topics    Smoking status: Never Smoker    Smokeless tobacco: Never Used    Alcohol use No      History reviewed. No pertinent family history.     Review of Systems   Constitutional: Negative for chills, fatigue and fever. HENT: Negative for congestion, ear pain, nosebleeds, postnasal drip and sore throat. Respiratory: Negative for cough, chest tightness and wheezing. Cardiovascular: Negative for chest pain, palpitations and leg swelling. Gastrointestinal: Negative for abdominal pain and constipation. Genitourinary: Negative for dysuria and urgency. Musculoskeletal: Negative. Negative for arthralgias. Skin: Negative for rash. Neurological: Negative for dizziness and headaches. Psychiatric/Behavioral: Negative. Vitals:    11/06/17 1043   BP: (!) 122/46   Site: Right Arm   Position: Sitting   Cuff Size: Medium Adult   Pulse: 72   Resp: 18   Weight: 167 lb (75.8 kg)   Height: 5' 2\" (1.575 m)     Body mass index is 30.54 kg/m². Physical Exam   Constitutional: She is oriented to person, place, and time. She appears well-developed. No distress. HENT:   Head: Normocephalic and atraumatic. Nose: Nose normal.   Mouth/Throat: Oropharynx is clear and moist.   Eyes: Conjunctivae are normal. Pupils are equal, round, and reactive to light. Right eye exhibits no discharge. Left eye exhibits no discharge. No scleral icterus. Neck: Normal range of motion. No JVD present. No thyromegaly present. Cardiovascular: Normal rate and regular rhythm. No murmur heard. Pulmonary/Chest: Breath sounds normal. She has no wheezes. She has no rales. She exhibits no tenderness. Abdominal: Bowel sounds are normal. She exhibits no distension. There is no guarding. Musculoskeletal: She exhibits no edema. Lymphadenopathy:     She has no cervical adenopathy. Neurological: She is oriented to person, place, and time. She has normal reflexes. No cranial nerve deficit. Coordination normal.   Skin: Skin is dry. No rash noted. She is not diaphoretic. No erythema.    Psychiatric: Her behavior is normal. Judgment and thought content normal.       Lab Review   Orders Only on 11/06/2017   Component Date encounter note is electronic transcription/translation of spoken language to printed text. The electronic translation of spoken language may be erroneous, or at times, nonsensical words or phrases may be inadvertently transcribed.  Although I have reviewed the note for such errors, some may still exist.

## 2017-11-07 RX ORDER — POTASSIUM CHLORIDE 20 MEQ/1
20 TABLET, EXTENDED RELEASE ORAL 2 TIMES DAILY
Qty: 60 TABLET | Refills: 2 | Status: SHIPPED | OUTPATIENT
Start: 2017-11-07 | End: 2018-02-19 | Stop reason: SDUPTHER

## 2017-11-15 ENCOUNTER — TELEPHONE (OUTPATIENT)
Dept: INTERNAL MEDICINE | Age: 82
End: 2017-11-15

## 2017-11-22 ENCOUNTER — CARE COORDINATION (OUTPATIENT)
Dept: CARE COORDINATION | Age: 82
End: 2017-11-22

## 2017-11-24 NOTE — CARE COORDINATION
Called and talked with son, Melissa Roberts. States pt is participating in [facility]. Has fallen 2 times in the last month. States Home Health is all she needs and all he can handle taking care of right now. Will follow up with them in 8-12 weeks to discuss again.

## 2017-11-27 ENCOUNTER — CLINICAL SUPPORT (OUTPATIENT)
Dept: CARDIOLOGY | Facility: CLINIC | Age: 82
End: 2017-11-27

## 2017-11-27 DIAGNOSIS — I49.5 SSS (SICK SINUS SYNDROME) (HCC): ICD-10-CM

## 2017-11-27 PROCEDURE — 93296 REM INTERROG EVL PM/IDS: CPT | Performed by: PHYSICIAN ASSISTANT

## 2017-11-27 PROCEDURE — 93294 REM INTERROG EVL PM/LDLS PM: CPT | Performed by: PHYSICIAN ASSISTANT

## 2017-11-29 NOTE — PROGRESS NOTES
Dual Chamber Pacemaker Evaluation Report  [facility]    November 28, 2017    Primary Cardiologist: Sharath  : Medtronic Model: Adapta  Implant date: 6/3/09    Reason for evaluation: routine  Indication for pacemaker: sick sinus syndrome    Measurements  Atrial sensing - P wave: n/r  Atrial threshold: 0.75 V@ 0.4 ms  Atrial lead impedance: 559 ohms  Ventricular sensing - R wave: 5.6-16.0 mV  Ventricular threshold: 1.0 V @ 0.4 ms  Ventricular lead impedance:   752 ohms     Diagnostic Data  Atrial paced: 99.3* %  Ventricular paced: 5.9 %  Other: no new events noted  Battery status: satisfactory         Final Parameters  Mode:  AAIR+  Lower rate: 60 bpm   Upper rate: 130 bpm  AV Delay: paced- 300 ms  Sensed-280 ms  Atrial - Amplitude: 1.625 V   Pulse width: 0.4 ms   Sensitivity: 0.18 mV     Ventricular - Amplitude: 2. V  Pulse width: 0.4ms  Sensitivity: 2.8 mV    Changes made: n/a  Conclusions: normal pacemaker function    Follow up: 6 months

## 2017-12-04 RX ORDER — CARVEDILOL 25 MG/1
TABLET ORAL
Qty: 60 TABLET | Refills: 5 | Status: SHIPPED | OUTPATIENT
Start: 2017-12-04 | End: 2017-12-05 | Stop reason: SDUPTHER

## 2017-12-05 RX ORDER — CARVEDILOL 25 MG/1
TABLET ORAL
Qty: 60 TABLET | Refills: 5 | Status: SHIPPED | OUTPATIENT
Start: 2017-12-05 | End: 2018-03-06 | Stop reason: SDUPTHER

## 2017-12-15 RX ORDER — AMLODIPINE BESYLATE 10 MG/1
TABLET ORAL
Qty: 30 TABLET | Refills: 5 | Status: SHIPPED | OUTPATIENT
Start: 2017-12-15 | End: 2018-03-06 | Stop reason: SDUPTHER

## 2017-12-15 NOTE — TELEPHONE ENCOUNTER
Requested Prescriptions     Pending Prescriptions Disp Refills    amLODIPine (NORVASC) 10 MG tablet 30 tablet 5     Sig: TAKE ONE TABLET BY MOUTH EVERY DAY

## 2017-12-27 RX ORDER — OLMESARTAN MEDOXOMIL 40 MG/1
TABLET ORAL
Qty: 30 TABLET | Refills: 7 | Status: SHIPPED | OUTPATIENT
Start: 2017-12-27 | End: 2018-03-06 | Stop reason: SDUPTHER

## 2017-12-27 RX ORDER — DRONEDARONE 400 MG/1
TABLET, FILM COATED ORAL
Qty: 60 TABLET | Refills: 7 | Status: SHIPPED | OUTPATIENT
Start: 2017-12-27 | End: 2018-03-06 | Stop reason: SDUPTHER

## 2018-01-19 RX ORDER — RIVAROXABAN 15 MG/1
TABLET, FILM COATED ORAL
Qty: 30 TABLET | Refills: 4 | Status: SHIPPED | OUTPATIENT
Start: 2018-01-19 | End: 2018-03-06 | Stop reason: SDUPTHER

## 2018-02-19 RX ORDER — LEVOTHYROXINE SODIUM 0.05 MG/1
TABLET ORAL
Qty: 30 TABLET | Refills: 5 | Status: SHIPPED | OUTPATIENT
Start: 2018-02-19 | End: 2018-03-06 | Stop reason: CLARIF

## 2018-02-19 RX ORDER — POTASSIUM CHLORIDE 20 MEQ/1
TABLET, EXTENDED RELEASE ORAL
Qty: 60 TABLET | Refills: 2 | Status: SHIPPED | OUTPATIENT
Start: 2018-02-19 | End: 2018-03-06 | Stop reason: SDUPTHER

## 2018-02-21 RX ORDER — LEVOTHYROXINE SODIUM 0.05 MG/1
TABLET ORAL
Qty: 90 TABLET | Refills: 3 | Status: SHIPPED | OUTPATIENT
Start: 2018-02-21 | End: 2019-08-08 | Stop reason: SDUPTHER

## 2018-02-21 RX ORDER — POTASSIUM CHLORIDE 20 MEQ/1
TABLET, EXTENDED RELEASE ORAL
Qty: 180 TABLET | Refills: 3 | Status: SHIPPED | OUTPATIENT
Start: 2018-02-21 | End: 2019-04-08 | Stop reason: SDUPTHER

## 2018-03-06 ENCOUNTER — OFFICE VISIT (OUTPATIENT)
Dept: INTERNAL MEDICINE | Age: 83
End: 2018-03-06
Payer: MEDICARE

## 2018-03-06 VITALS
SYSTOLIC BLOOD PRESSURE: 138 MMHG | OXYGEN SATURATION: 98 % | HEART RATE: 63 BPM | BODY MASS INDEX: 29.08 KG/M2 | WEIGHT: 158 LBS | HEIGHT: 62 IN | DIASTOLIC BLOOD PRESSURE: 60 MMHG | RESPIRATION RATE: 18 BRPM

## 2018-03-06 DIAGNOSIS — E78.00 PURE HYPERCHOLESTEROLEMIA: ICD-10-CM

## 2018-03-06 DIAGNOSIS — E11.42 TYPE 2 DIABETES MELLITUS WITH DIABETIC POLYNEUROPATHY, WITHOUT LONG-TERM CURRENT USE OF INSULIN (HCC): ICD-10-CM

## 2018-03-06 DIAGNOSIS — N39.0 URINARY TRACT INFECTION WITHOUT HEMATURIA, SITE UNSPECIFIED: Primary | ICD-10-CM

## 2018-03-06 DIAGNOSIS — E55.9 VITAMIN D DEFICIENCY: ICD-10-CM

## 2018-03-06 DIAGNOSIS — E03.9 ACQUIRED HYPOTHYROIDISM: ICD-10-CM

## 2018-03-06 DIAGNOSIS — E11.42 TYPE 2 DIABETES MELLITUS WITH DIABETIC POLYNEUROPATHY, WITHOUT LONG-TERM CURRENT USE OF INSULIN (HCC): Primary | ICD-10-CM

## 2018-03-06 DIAGNOSIS — Z99.89 OSA ON CPAP: ICD-10-CM

## 2018-03-06 DIAGNOSIS — I50.32 CHRONIC DIASTOLIC CONGESTIVE HEART FAILURE (HCC): ICD-10-CM

## 2018-03-06 DIAGNOSIS — N39.0 URINARY TRACT INFECTION WITHOUT HEMATURIA, SITE UNSPECIFIED: ICD-10-CM

## 2018-03-06 DIAGNOSIS — N30.00 ACUTE CYSTITIS WITHOUT HEMATURIA: ICD-10-CM

## 2018-03-06 DIAGNOSIS — G47.33 OSA ON CPAP: ICD-10-CM

## 2018-03-06 LAB
ALBUMIN SERPL-MCNC: 4.1 G/DL (ref 3.5–5.2)
ALP BLD-CCNC: 66 U/L (ref 35–104)
ALT SERPL-CCNC: 16 U/L (ref 5–33)
ANION GAP SERPL CALCULATED.3IONS-SCNC: 20 MMOL/L (ref 7–19)
AST SERPL-CCNC: 18 U/L (ref 5–32)
BACTERIA: ABNORMAL /HPF
BILIRUB SERPL-MCNC: 0.7 MG/DL (ref 0.2–1.2)
BILIRUBIN URINE: NEGATIVE
BLOOD, URINE: NEGATIVE
BUN BLDV-MCNC: 25 MG/DL (ref 8–23)
CALCIUM SERPL-MCNC: 9.2 MG/DL (ref 8.2–9.6)
CHLORIDE BLD-SCNC: 98 MMOL/L (ref 98–111)
CHOLESTEROL, TOTAL: 152 MG/DL (ref 160–199)
CLARITY: CLEAR
CO2: 26 MMOL/L (ref 22–29)
COLOR: YELLOW
CREAT SERPL-MCNC: 1.5 MG/DL (ref 0.5–0.9)
EPITHELIAL CELLS, UA: 1 /HPF (ref 0–5)
GFR NON-AFRICAN AMERICAN: 32
GLUCOSE BLD-MCNC: 112 MG/DL (ref 74–109)
GLUCOSE URINE: NEGATIVE MG/DL
HBA1C MFR BLD: 5.7 %
HDLC SERPL-MCNC: 70 MG/DL (ref 65–121)
HYALINE CASTS: 3 /HPF (ref 0–8)
KETONES, URINE: NEGATIVE MG/DL
LDL CHOLESTEROL CALCULATED: 65 MG/DL
LEUKOCYTE ESTERASE, URINE: ABNORMAL
NITRITE, URINE: NEGATIVE
PH UA: 6.5
POTASSIUM SERPL-SCNC: 3.6 MMOL/L (ref 3.5–5)
PROTEIN UA: NEGATIVE MG/DL
RBC UA: 2 /HPF (ref 0–4)
SODIUM BLD-SCNC: 144 MMOL/L (ref 136–145)
SPECIFIC GRAVITY UA: 1.01
T4 FREE: 1.7 NG/DL (ref 0.9–1.7)
TOTAL PROTEIN: 6.9 G/DL (ref 6.6–8.7)
TRIGL SERPL-MCNC: 84 MG/DL (ref 0–149)
TSH SERPL DL<=0.05 MIU/L-ACNC: 2.03 UIU/ML (ref 0.27–4.2)
UROBILINOGEN, URINE: 1 E.U./DL
WBC UA: 31 /HPF (ref 0–5)

## 2018-03-06 PROCEDURE — G8484 FLU IMMUNIZE NO ADMIN: HCPCS | Performed by: INTERNAL MEDICINE

## 2018-03-06 PROCEDURE — 1123F ACP DISCUSS/DSCN MKR DOCD: CPT | Performed by: INTERNAL MEDICINE

## 2018-03-06 PROCEDURE — 99214 OFFICE O/P EST MOD 30 MIN: CPT | Performed by: INTERNAL MEDICINE

## 2018-03-06 PROCEDURE — G8427 DOCREV CUR MEDS BY ELIG CLIN: HCPCS | Performed by: INTERNAL MEDICINE

## 2018-03-06 PROCEDURE — G8417 CALC BMI ABV UP PARAM F/U: HCPCS | Performed by: INTERNAL MEDICINE

## 2018-03-06 PROCEDURE — G8598 ASA/ANTIPLAT THER USED: HCPCS | Performed by: INTERNAL MEDICINE

## 2018-03-06 PROCEDURE — 1036F TOBACCO NON-USER: CPT | Performed by: INTERNAL MEDICINE

## 2018-03-06 PROCEDURE — 1090F PRES/ABSN URINE INCON ASSESS: CPT | Performed by: INTERNAL MEDICINE

## 2018-03-06 PROCEDURE — 4040F PNEUMOC VAC/ADMIN/RCVD: CPT | Performed by: INTERNAL MEDICINE

## 2018-03-06 RX ORDER — AMITRIPTYLINE HYDROCHLORIDE 25 MG/1
25 TABLET, FILM COATED ORAL NIGHTLY
Qty: 90 TABLET | Refills: 1 | Status: SHIPPED | OUTPATIENT
Start: 2018-03-06 | End: 2019-08-08 | Stop reason: SDUPTHER

## 2018-03-06 RX ORDER — CARVEDILOL 25 MG/1
TABLET ORAL
Qty: 180 TABLET | Refills: 1 | Status: SHIPPED | OUTPATIENT
Start: 2018-03-06 | End: 2018-11-15 | Stop reason: SDUPTHER

## 2018-03-06 RX ORDER — RANOLAZINE 500 MG/1
TABLET, EXTENDED RELEASE ORAL
Qty: 180 TABLET | Refills: 1 | Status: SHIPPED | OUTPATIENT
Start: 2018-03-06 | End: 2018-09-18 | Stop reason: SDUPTHER

## 2018-03-06 RX ORDER — SIMVASTATIN 40 MG
TABLET ORAL
Qty: 90 TABLET | Refills: 1 | Status: SHIPPED | OUTPATIENT
Start: 2018-03-06 | End: 2018-08-06

## 2018-03-06 RX ORDER — FUROSEMIDE 40 MG/1
TABLET ORAL
Qty: 135 TABLET | Refills: 1 | Status: SHIPPED | OUTPATIENT
Start: 2018-03-06 | End: 2019-08-08 | Stop reason: SDUPTHER

## 2018-03-06 RX ORDER — AMLODIPINE BESYLATE 10 MG/1
TABLET ORAL
Qty: 90 TABLET | Refills: 1 | Status: SHIPPED | OUTPATIENT
Start: 2018-03-06 | End: 2018-12-18 | Stop reason: SDUPTHER

## 2018-03-06 RX ORDER — CEFUROXIME AXETIL 250 MG/1
250 TABLET ORAL 2 TIMES DAILY
Qty: 14 TABLET | Refills: 0 | Status: SHIPPED | OUTPATIENT
Start: 2018-03-06 | End: 2018-03-13

## 2018-03-06 RX ORDER — OLMESARTAN MEDOXOMIL 40 MG/1
TABLET ORAL
Qty: 90 TABLET | Refills: 1 | Status: SHIPPED | OUTPATIENT
Start: 2018-03-06 | End: 2019-08-08 | Stop reason: SDUPTHER

## 2018-03-06 RX ORDER — POTASSIUM CHLORIDE 20 MEQ/1
TABLET, EXTENDED RELEASE ORAL
Qty: 180 TABLET | Refills: 1 | Status: SHIPPED | OUTPATIENT
Start: 2018-03-06 | End: 2018-08-06 | Stop reason: CLARIF

## 2018-03-06 ASSESSMENT — ENCOUNTER SYMPTOMS
COUGH: 0
WHEEZING: 0
ABDOMINAL PAIN: 0
CHEST TIGHTNESS: 0
SORE THROAT: 0
CONSTIPATION: 0

## 2018-03-09 LAB
ORGANISM: ABNORMAL
ORGANISM: ABNORMAL
URINE CULTURE, ROUTINE: ABNORMAL

## 2018-05-24 ENCOUNTER — OFFICE VISIT (OUTPATIENT)
Dept: CARDIOLOGY | Facility: CLINIC | Age: 83
End: 2018-05-24

## 2018-05-24 ENCOUNTER — CLINICAL SUPPORT NO REQUIREMENTS (OUTPATIENT)
Dept: CARDIOLOGY | Facility: CLINIC | Age: 83
End: 2018-05-24

## 2018-05-24 VITALS
SYSTOLIC BLOOD PRESSURE: 138 MMHG | HEART RATE: 63 BPM | DIASTOLIC BLOOD PRESSURE: 58 MMHG | BODY MASS INDEX: 32.94 KG/M2 | WEIGHT: 179 LBS | HEIGHT: 62 IN

## 2018-05-24 DIAGNOSIS — I10 ESSENTIAL HYPERTENSION: ICD-10-CM

## 2018-05-24 DIAGNOSIS — E66.09 CLASS 1 OBESITY DUE TO EXCESS CALORIES WITH SERIOUS COMORBIDITY AND BODY MASS INDEX (BMI) OF 32.0 TO 32.9 IN ADULT: ICD-10-CM

## 2018-05-24 DIAGNOSIS — R94.31 PROLONGED Q-T INTERVAL ON ECG: ICD-10-CM

## 2018-05-24 DIAGNOSIS — Z95.5 HX OF RIGHT CORONARY ARTERY STENT PLACEMENT: ICD-10-CM

## 2018-05-24 DIAGNOSIS — I25.10 CORONARY ARTERY DISEASE INVOLVING NATIVE CORONARY ARTERY OF NATIVE HEART WITHOUT ANGINA PECTORIS: ICD-10-CM

## 2018-05-24 DIAGNOSIS — Z79.01 CHRONIC ANTICOAGULATION: ICD-10-CM

## 2018-05-24 DIAGNOSIS — I49.5 SSS (SICK SINUS SYNDROME) (HCC): ICD-10-CM

## 2018-05-24 DIAGNOSIS — E78.2 MIXED HYPERLIPIDEMIA: ICD-10-CM

## 2018-05-24 DIAGNOSIS — I50.32 CHRONIC DIASTOLIC CONGESTIVE HEART FAILURE (HCC): ICD-10-CM

## 2018-05-24 DIAGNOSIS — Z95.0 PACEMAKER: ICD-10-CM

## 2018-05-24 DIAGNOSIS — I48.0 PAROXYSMAL ATRIAL FIBRILLATION (HCC): Primary | ICD-10-CM

## 2018-05-24 DIAGNOSIS — Z95.0 PACEMAKER: Primary | ICD-10-CM

## 2018-05-24 PROCEDURE — 93280 PM DEVICE PROGR EVAL DUAL: CPT | Performed by: INTERNAL MEDICINE

## 2018-05-24 PROCEDURE — 93000 ELECTROCARDIOGRAM COMPLETE: CPT | Performed by: NURSE PRACTITIONER

## 2018-05-24 PROCEDURE — 99214 OFFICE O/P EST MOD 30 MIN: CPT | Performed by: NURSE PRACTITIONER

## 2018-05-24 NOTE — PATIENT INSTRUCTIONS
Obesity, Adult  Obesity is the condition of having too much total body fat. Being overweight or obese means that your weight is greater than what is considered healthy for your body size. Obesity is determined by a measurement called BMI. BMI is an estimate of body fat and is calculated from height and weight. For adults, a BMI of 30 or higher is considered obese.  Obesity can eventually lead to other health concerns and major illnesses, including:  · Stroke.  · Coronary artery disease (CAD).  · Type 2 diabetes.  · Some types of cancer, including cancers of the colon, breast, uterus, and gallbladder.  · Osteoarthritis.  · High blood pressure (hypertension).  · High cholesterol.  · Sleep apnea.  · Gallbladder stones.  · Infertility problems.  What are the causes?  The main cause of obesity is taking in (consuming) more calories than your body uses for energy. Other factors that contribute to this condition may include:  · Being born with genes that make you more likely to become obese.  · Having a medical condition that causes obesity. These conditions include:  ¨ Hypothyroidism.  ¨ Polycystic ovarian syndrome (PCOS).  ¨ Binge-eating disorder.  ¨ Cushing syndrome.  · Taking certain medicines, such as steroids, antidepressants, and seizure medicines.  · Not being physically active (sedentary lifestyle).  · Living where there are limited places to exercise safely or buy healthy foods.  · Not getting enough sleep.  What increases the risk?  The following factors may increase your risk of this condition:  · Having a family history of obesity.  · Being a woman of -American descent.  · Being a man of  descent.  What are the signs or symptoms?  Having excessive body fat is the main symptom of this condition.  How is this diagnosed?  This condition may be diagnosed based on:  · Your symptoms.  · Your medical history.  · A physical exam. Your health care provider may measure:  ¨ Your BMI. If you are an adult  with a BMI between 25 and less than 30, you are considered overweight. If you are an adult with a BMI of 30 or higher, you are considered obese.  ¨ The distances around your hips and your waist (circumferences). These may be compared to each other to help diagnose your condition.  ¨ Your skinfold thickness. Your health care provider may gently pinch a fold of your skin and measure it.  How is this treated?  Treatment for this condition often includes changing your lifestyle. Treatment may include some or all of the following:  · Dietary changes. Work with your health care provider and a dietitian to set a weight-loss goal that is healthy and reasonable for you. Dietary changes may include eating:  ¨ Smaller portions. A portion size is the amount of a particular food that is healthy for you to eat at one time. This varies from person to person.  ¨ Low-calorie or low-fat options.  ¨ More whole grains, fruits, and vegetables.  · Regular physical activity. This may include aerobic activity (cardio) and strength training.  · Medicine to help you lose weight. Your health care provider may prescribe medicine if you are unable to lose 1 pound a week after 6 weeks of eating more healthily and doing more physical activity.  · Surgery. Surgical options may include gastric banding and gastric bypass. Surgery may be done if:  ¨ Other treatments have not helped to improve your condition.  ¨ You have a BMI of 40 or higher.  ¨ You have life-threatening health problems related to obesity.  Follow these instructions at home:     Eating and drinking     · Follow recommendations from your health care provider about what you eat and drink. Your health care provider may advise you to:  ¨ Limit fast foods, sweets, and processed snack foods.  ¨ Choose low-fat options, such as low-fat milk instead of whole milk.  ¨ Eat 5 or more servings of fruits or vegetables every day.  ¨ Eat at home more often. This gives you more control over what you  eat.  ¨ Choose healthy foods when you eat out.  ¨ Learn what a healthy portion size is.  ¨ Keep low-fat snacks on hand.  ¨ Avoid sugary drinks, such as soda, fruit juice, iced tea sweetened with sugar, and flavored milk.  ¨ Eat a healthy breakfast.  · Drink enough water to keep your urine clear or pale yellow.  · Do not go without eating for long periods of time (do not fast) or follow a fad diet. Fasting and fad diets can be unhealthy and even dangerous.  Physical Activity   · Exercise regularly, as told by your health care provider. Ask your health care provider what types of exercise are safe for you and how often you should exercise.  · Warm up and stretch before being active.  · Cool down and stretch after being active.  · Rest between periods of activity.  Lifestyle   · Limit the time that you spend in front of your TV, computer, or video game system.  · Find ways to reward yourself that do not involve food.  · Limit alcohol intake to no more than 1 drink a day for nonpregnant women and 2 drinks a day for men. One drink equals 12 oz of beer, 5 oz of wine, or 1½ oz of hard liquor.  General instructions   · Keep a weight loss journal to keep track of the food you eat and how much you exercise you get.  · Take over-the-counter and prescription medicines only as told by your health care provider.  · Take vitamins and supplements only as told by your health care provider.  · Consider joining a support group. Your health care provider may be able to recommend a support group.  · Keep all follow-up visits as told by your health care provider. This is important.  Contact a health care provider if:  · You are unable to meet your weight loss goal after 6 weeks of dietary and lifestyle changes.  This information is not intended to replace advice given to you by your health care provider. Make sure you discuss any questions you have with your health care provider.  Document Released: 01/25/2006 Document Revised:  "05/22/2017 Document Reviewed: 10/05/2016  [employer] Interactive Patient Education © 2017 [employer].    Heart-Healthy Eating Plan  Heart-healthy meal planning includes:  · Limiting unhealthy fats.  · Increasing healthy fats.  · Making other small dietary changes.  You may need to talk with your doctor or a diet specialist (dietitian) to create an eating plan that is right for you.  What types of fat should I choose?  · Choose healthy fats. These include olive oil and canola oil, flaxseeds, walnuts, almonds, and seeds.  · Eat more omega-3 fats. These include salmon, mackerel, sardines, tuna, flaxseed oil, and ground flaxseeds. Try to eat fish at least twice each week.  · Limit saturated fats.  ¨ Saturated fats are often found in animal products, such as meats, butter, and cream.  ¨ Plant sources of saturated fats include palm oil, palm kernel oil, and coconut oil.  · Avoid foods with partially hydrogenated oils in them. These include stick margarine, some tub margarines, cookies, crackers, and other baked goods. These contain trans fats.  What general guidelines do I need to follow?  · Check food labels carefully. Identify foods with trans fats or high amounts of saturated fat.  · Fill one half of your plate with vegetables and green salads. Eat 4-5 servings of vegetables per day. A serving of vegetables is:  ¨ 1 cup of raw leafy vegetables.  ¨ ½ cup of raw or cooked cut-up vegetables.  ¨ ½ cup of vegetable juice.  · Fill one fourth of your plate with whole grains. Look for the word \"whole\" as the first word in the ingredient list.  · Fill one fourth of your plate with lean protein foods.  · Eat 4-5 servings of fruit per day. A serving of fruit is:  ¨ One medium whole fruit.  ¨ ¼ cup of dried fruit.  ¨ ½ cup of fresh, frozen, or canned fruit.  ¨ ½ cup of 100% fruit juice.  · Eat more foods that contain soluble fiber. These include apples, broccoli, carrots, beans, peas, and barley. Try to get 20-30 g of fiber per " day.  · Eat more home-cooked food. Eat less restaurant, buffet, and fast food.  · Limit or avoid alcohol.  · Limit foods high in starch and sugar.  · Avoid fried foods.  · Avoid frying your food. Try baking, boiling, grilling, or broiling it instead. You can also reduce fat by:  ¨ Removing the skin from poultry.  ¨ Removing all visible fats from meats.  ¨ Skimming the fat off of stews, soups, and gravies before serving them.  ¨ Steaming vegetables in water or broth.  · Lose weight if you are overweight.  · Eat 4-5 servings of nuts, legumes, and seeds per week:  ¨ One serving of dried beans or legumes equals ½ cup after being cooked.  ¨ One serving of nuts equals 1½ ounces.  ¨ One serving of seeds equals ½ ounce or one tablespoon.  · You may need to keep track of how much salt or sodium you eat. This is especially true if you have high blood pressure. Talk with your doctor or dietitian to get more information.  What foods can I eat?  Grains   Breads, including Citizen of [place], white, raine, wheat, raisin, rye, oatmeal, and Italian. Tortillas that are neither fried nor made with lard or trans fat. Low-fat rolls, including hotdog and hamburger buns and English muffins. Biscuits. Muffins. Waffles. Pancakes. Light popcorn. Whole-grain cereals. Flatbread. Ciera toast. Pretzels. Breadsticks. Rusks. Low-fat snacks. Low-fat crackers, including oyster, saltine, matzo, clemente, animal, and rye. Rice and pasta, including brown rice and pastas that are made with whole wheat.  Vegetables   All vegetables.  Fruits   All fruits, but limit coconut.  Meats and Other Protein Sources   Lean, well-trimmed beef, veal, pork, and lamb. Chicken and turkey without skin. All fish and shellfish. Wild duck, rabbit, pheasant, and venison. Egg whites or low-cholesterol egg substitutes. Dried beans, peas, lentils, and tofu. Seeds and most nuts.  Dairy   Low-fat or nonfat cheeses, including ricotta, string, and mozzarella. Skim or 1% milk that is liquid,  powdered, or evaporated. Buttermilk that is made with low-fat milk. Nonfat or low-fat yogurt.  Beverages   Mineral water. Diet carbonated beverages.  Sweets and Desserts   Sherbets and fruit ices. Honey, jam, marmalade, jelly, and syrups. Meringues and gelatins. Pure sugar candy, such as hard candy, jelly beans, gumdrops, mints, marshmallows, and small amounts of dark chocolate. Paul food cake.  Eat all sweets and desserts in moderation.  Fats and Oils   Nonhydrogenated (trans-free) margarines. Vegetable oils, including soybean, sesame, sunflower, olive, peanut, safflower, corn, canola, and cottonseed. Salad dressings or mayonnaise made with a vegetable oil. Limit added fats and oils that you use for cooking, baking, salads, and as spreads.  Other   Cocoa powder. Coffee and tea. All seasonings and condiments.  The items listed above may not be a complete list of recommended foods or beverages. Contact your dietitian for more options.   What foods are not recommended?  Grains   Breads that are made with saturated or trans fats, oils, or whole milk. Croissants. Butter rolls. Cheese breads. Sweet rolls. Donuts. Buttered popcorn. Chow mein noodles. High-fat crackers, such as cheese or butter crackers.  Meats and Other Protein Sources   Fatty meats, such as hotdogs, short ribs, sausage, spareribs, santacruz, rib eye roast or steak, and mutton. High-fat deli meats, such as salami and bologna. Caviar. Domestic duck and goose. Organ meats, such as kidney, liver, sweetbreads, and heart.  Dairy   Cream, sour cream, cream cheese, and creamed cottage cheese. Whole-milk cheeses, including blue (latisha), [place] Kevyn, Brie, [place], American, Havarti, Swiss, cheddar, Camembert, and [place]. Whole or 2% milk that is liquid, evaporated, or condensed. Whole buttermilk. Cream sauce or high-fat cheese sauce. Yogurt that is made from whole milk.  Beverages   Regular sodas and juice drinks with added sugar.  Sweets and Desserts   Frosting.  Pudding. Cookies. Cakes other than lorena food cake. Candy that has milk chocolate or white chocolate, hydrogenated fat, butter, coconut, or unknown ingredients. Buttered syrups. Full-fat ice cream or ice cream drinks.  Fats and Oils   Gravy that has suet, meat fat, or shortening. Cocoa butter, hydrogenated oils, palm oil, coconut oil, palm kernel oil. These can often be found in baked products, candy, fried foods, nondairy creamers, and whipped toppings. Solid fats and shortenings, including santacruz fat, salt pork, lard, and butter. Nondairy cream substitutes, such as coffee creamers and sour cream substitutes. Salad dressings that are made of unknown oils, cheese, or sour cream.  The items listed above may not be a complete list of foods and beverages to avoid. Contact your dietitian for more information.   This information is not intended to replace advice given to you by your health care provider. Make sure you discuss any questions you have with your health care provider.  Document Released: 06/18/2013 Document Revised: 05/25/2017 Document Reviewed: 06/11/2015  [employer] Interactive Patient Education © 2017 [employer]    Exercising to Lose Weight  Exercising can help you to lose weight. In order to lose weight through exercise, you need to do vigorous-intensity exercise. You can tell that you are exercising with vigorous intensity if you are breathing very hard and fast and cannot hold a conversation while exercising.  Moderate-intensity exercise helps to maintain your current weight. You can tell that you are exercising at a moderate level if you have a higher heart rate and faster breathing, but you are still able to hold a conversation.  How often should I exercise?  Choose an activity that you enjoy and set realistic goals. Your health care provider can help you to make an activity plan that works for you. Exercise regularly as directed by your health care provider. This may include:  · Doing resistance  training twice each week, such as:  ¨ Push-ups.  ¨ Sit-ups.  ¨ Lifting weights.  ¨ Using resistance bands.  · Doing a given intensity of exercise for a given amount of time. Choose from these options:  ¨ 150 minutes of moderate-intensity exercise every week.  ¨ 75 minutes of vigorous-intensity exercise every week.  ¨ A mix of moderate-intensity and vigorous-intensity exercise every week.  Children, pregnant women, people who are out of shape, people who are overweight, and older adults may need to consult a health care provider for individual recommendations. If you have any sort of medical condition, be sure to consult your health care provider before starting a new exercise program.  What are some activities that can help me to lose weight?  · Walking at a rate of at least 4.5 miles an hour.  · Jogging or running at a rate of 5 miles per hour.  · Biking at a rate of at least 10 miles per hour.  · Lap swimming.  · Roller-skating or in-line skating.  · Cross-country skiing.  · Vigorous competitive sports, such as football, basketball, and soccer.  · Jumping rope.  · Aerobic dancing.  How can I be more active in my day-to-day activities?  · Use the stairs instead of the elevator.  · Take a walk during your lunch break.  · If you drive, park your car farther away from work or school.  · If you take public transportation, get off one stop early and walk the rest of the way.  · Make all of your phone calls while standing up and walking around.  · Get up, stretch, and walk around every 30 minutes throughout the day.  What guidelines should I follow while exercising?  · Do not exercise so much that you hurt yourself, feel dizzy, or get very short of breath.  · Consult your health care provider prior to starting a new exercise program.  · Wear comfortable clothes and shoes with good support.  · Drink plenty of water while you exercise to prevent dehydration or heat stroke. Body water is lost during exercise and must be  replaced.  · Work out until you breathe faster and your heart beats faster.  This information is not intended to replace advice given to you by your health care provider. Make sure you discuss any questions you have with your health care provider.  Document Released: 01/20/2012 Document Revised: 05/25/2017 Document Reviewed: 05/21/2015  [employer] Interactive Patient Education © 2017 [employer]

## 2018-05-24 NOTE — PROGRESS NOTES
Subjective:     Encounter Date:05/24/2018      Patient ID: Juarez Giles is a 89 y.o. female. She presents today for yearly follow-up of coronary artery disease s/p stent placement to the right coronary artery remotely. She has a history of paroxysmal atrial fibrillation on chronic anticoagulation, sick sinus syndrome s/p pacemaker, chronic diastolic congestive heart failure, hypertension, hyperlipidemia and obesity. She continues to complain of chronic bilateral lower extremity edema and chronic dyspnea with exertion but states that these are at baseline. She denies chest pain, palpitations, dizziness, syncope, orthopnea, PND or decreased stamina. She denies any signs of bleeding.     Chief Complaint:  Atrial Fibrillation   Presents for follow-up visit. Symptoms are negative for an AICD problem, bradycardia, chest pain, dizziness, hemodynamic instability, hypertension, hypotension, pacemaker problem, palpitations, shortness of breath, syncope, tachycardia and weakness. The symptoms have been stable. Past medical history includes atrial fibrillation, CAD and hyperlipidemia.   Coronary Artery Disease   Presents for follow-up visit. Symptoms include leg swelling. Pertinent negatives include no chest pain, chest pressure, chest tightness, dizziness, muscle weakness, palpitations, shortness of breath or weight gain. Risk factors include hyperlipidemia. Risk factors do not include hypertension. The symptoms have been stable. Compliance with diet is variable. Compliance with exercise is variable. Compliance with medications is good.   Hypertension   This is a chronic problem. The current episode started more than 1 year ago. The problem is controlled. Associated symptoms include peripheral edema. Pertinent negatives include no anxiety, blurred vision, chest pain, headaches, malaise/fatigue, neck pain, orthopnea, palpitations, PND, shortness of breath or sweats. Risk factors for coronary artery disease include  post-menopausal state. Current antihypertension treatment includes beta blockers, diuretics and angiotensin blockers. The current treatment provides significant improvement. Hypertensive end-organ damage includes CAD/MI.   Hyperlipidemia   This is a chronic problem. The current episode started more than 1 year ago. The problem is controlled. Pertinent negatives include no chest pain or shortness of breath. Current antihyperlipidemic treatment includes statins.       The following portions of the patient's history were reviewed and updated as appropriate: allergies, current medications, past family history, past medical history, past social history, past surgical history and problem list.     No Known Allergies    Current Outpatient Prescriptions:   •  aspirin 81 MG EC tablet, Take 81 mg by mouth daily., Disp: , Rfl:   •  carvedilol (COREG) 25 MG tablet, Take 25 mg by mouth 2 (two) times a day with meals., Disp: , Rfl:   •  dronedarone (MULTAQ) 400 MG tablet, Take 400 mg by mouth 2 (two) times a day with meals., Disp: , Rfl:   •  furosemide (LASIX) 40 MG tablet, Take 40 mg by mouth 2 (two) times a day., Disp: , Rfl:   •  levothyroxine (SYNTHROID, LEVOTHROID) 50 MCG tablet, Take 50 mcg by mouth daily., Disp: , Rfl:   •  olmesartan-hydrochlorothiazide (BENICAR HCT) 40-12.5 MG per tablet, Take 1 tablet by mouth daily., Disp: , Rfl:   •  potassium chloride (K-DUR,KLOR-CON) 20 MEQ CR tablet, Take 20 mEq by mouth 2 (two) times a day., Disp: , Rfl:   •  ranolazine (RANEXA) 500 MG 12 hr tablet, Take 500 mg by mouth 2 (two) times a day., Disp: , Rfl:   •  rivaroxaban (XARELTO) 15 MG tablet, Take 15 mg by mouth daily., Disp: , Rfl:   •  simvastatin (ZOCOR) 40 MG tablet, Take 40 mg by mouth every night., Disp: , Rfl:   Past Medical History:   Diagnosis Date   • Atrial fibrillation    • CAD in native artery    • Cardiac pacemaker    • Coronary artery disease    • Diabetes mellitus     Uncontrolled    • Edema    • History of PTCA     • Hyperlipidemia    • Hypertension     with goal less than 140/90   • Macular degeneration    • Sleep apnea    • SSS (sick sinus syndrome)      Past Surgical History:   Procedure Laterality Date   • APPENDECTOMY     • CAROTID STENT     • CHOLECYSTECTOMY     • EYE SURGERY  11/25/2014    Cataract Extraction   • HYSTERECTOMY     • INSERT / REPLACE / REMOVE PACEMAKER     • TOTAL LAPAROSCOPIC HYSTERECTOMY WITH MID-URETHRAL SLING AND CYSTOSCOPY       Family History   Problem Relation Age of Onset   • Cancer Mother    • Tuberculosis Father    • Coronary artery disease Brother    • Heart disease Sister    • Heart disease Sister      Social History     Social History   • Marital status:      Spouse name: N/A   • Number of children: N/A   • Years of education: N/A     Occupational History   • Not on file.     Social History Main Topics   • Smoking status: Never Smoker   • Smokeless tobacco: Never Used   • Alcohol use No   • Drug use: No   • Sexual activity: Defer     Other Topics Concern   • Not on file     Social History Narrative   • No narrative on file         Review of Systems   Constitution: Negative for chills, decreased appetite, fever, weakness, malaise/fatigue, night sweats, weight gain and weight loss.   HENT: Negative for nosebleeds.    Eyes: Negative for blurred vision and visual disturbance.   Cardiovascular: Positive for dyspnea on exertion and leg swelling. Negative for chest pain, near-syncope, orthopnea, palpitations, paroxysmal nocturnal dyspnea and syncope.   Respiratory: Negative for chest tightness, cough, hemoptysis, shortness of breath, snoring and wheezing.    Endocrine: Negative for cold intolerance and heat intolerance.   Hematologic/Lymphatic: Does not bruise/bleed easily.   Skin: Negative for rash.   Musculoskeletal: Negative for back pain, falls, muscle weakness and neck pain.   Gastrointestinal: Negative for abdominal pain, change in bowel habit, constipation, diarrhea, dysphagia,  "heartburn, nausea and vomiting.   Genitourinary: Negative for hematuria.   Neurological: Negative for dizziness, headaches and light-headedness.   Psychiatric/Behavioral: Negative for altered mental status.   Allergic/Immunologic: Negative for persistent infections.         ECG 12 Lead  Date/Time: 5/24/2018 2:21 PM  Performed by: ZACHARY JONES  Authorized by: ZACHARY JONES   Comparison: compared with previous ECG from 5/24/2018  Similar to previous ECG  Rhythm: paced  Rate: normal  BPM: 63  Other findings: prolonged QTc interval          /58   Pulse 63   Ht 157.5 cm (62\")   Wt 81.2 kg (179 lb)   BMI 32.74 kg/m²        Objective:     Physical Exam   Constitutional: She is oriented to person, place, and time. Vital signs are normal. She appears well-developed and well-nourished. No distress.   HENT:   Head: Normocephalic and atraumatic.   Right Ear: External ear normal.   Left Ear: External ear normal.   Nose: Nose normal.   Eyes: Conjunctivae are normal. Pupils are equal, round, and reactive to light. Right eye exhibits no discharge. Left eye exhibits no discharge.   Neck: Normal range of motion. Neck supple. No JVD present. Carotid bruit is not present. No tracheal deviation present. No thyromegaly present.   Cardiovascular: Normal rate, regular rhythm, normal heart sounds, intact distal pulses and normal pulses.  PMI is not displaced.  Exam reveals no gallop and no friction rub.    No murmur heard.  Pulses:       Radial pulses are 2+ on the right side, and 2+ on the left side.        Dorsalis pedis pulses are 2+ on the right side, and 2+ on the left side.        Posterior tibial pulses are 2+ on the right side, and 2+ on the left side.   Pulmonary/Chest: Effort normal and breath sounds normal. No respiratory distress. She has no decreased breath sounds. She has no wheezes. She has no rhonchi. She has no rales. She exhibits no tenderness.   Abdominal: Soft. She exhibits no distension. There is no " tenderness.   Musculoskeletal: Normal range of motion. She exhibits edema (Moderate bilateral lower extremity edema). She exhibits no tenderness or deformity.   Neurological: She is alert and oriented to person, place, and time.   Skin: Skin is warm and dry. No rash noted. She is not diaphoretic. No erythema. No pallor.   Psychiatric: She has a normal mood and affect. Her behavior is normal. Judgment and thought content normal.   Vitals reviewed.        Assessment:          Diagnosis Plan   1. Paroxysmal atrial fibrillation  No recurrence on last several pacemaker interrogations. Anticoagulated.    2. Chronic anticoagulation  On Xarelto. Denies bleeding.    3. Chronic diastolic congestive heart failure  Compensated.    4. Coronary artery disease involving native coronary artery of native heart without angina pectoris  No clinical signs of ischemia.    5. Hx of right coronary artery stent placement  Remotely.    6. SSS (sick sinus syndrome)  S/P pacemaker.    7. Pacemaker  Interrogated 11/27/17- 99.3% A-paced, 5.9% V-paced, no arrhythmias. Will interrogate today in office.    8. Essential hypertension  Well controlled.    9. Mixed hyperlipidemia  Managed by PCP. On statin.    10. Class 1 obesity due to excess calories with serious comorbidity and body mass index (BMI) of 32.0 to 32.9 in adult  Patient's Body mass index is 32.74 kg/m². BMI is above normal parameters. Recommendations include: educational material.     11. Prolonged Q-T interval on ECG  Increased from 454 ms on 5/24/17 to 684 ms today. Discussed with Dr. Early. Recommends continuing present therapy unless ectopy is seen on pacemaker interrogation today.           Plan:       1. Pacemaker interrogation today.  2. Continue medications as previously prescribed.   3. Report any worsening symptoms.  4. Reviewed signs and symptoms of CHF and what to report with the patient. Patient instructed to restrict sodium and weigh daily. Report weight gain of greater  than 2 lbs overnight or 5 lbs in 1 week. Pt verbalized understanding of instructions and plan of care.   5. Continue heart healthy diet and regular exercise as tolerated.   6. Follow up with PCP for blood pressure and cholesterol management and routine lab work.  7. Follow up with Dr. Early in one year, or sooner if needed.

## 2018-05-30 NOTE — PROGRESS NOTES
Dual Chamber Pacemaker Evaluation Report  IN OFFICE INTERROGATION    May 24, 2018    Primary Cardiologist: Sharath  : Medtronic Model: Adapta  Implant date: 06/03/2001    Reason for evaluation: routine, provider requested  Indication for pacemaker: sick sinus syndrome    Measurements  Atrial sensing - P wave: 0.7-1 mV  Atrial threshold: 1V@ 0.4ms  Atrial lead impedance: 500 ohms  Ventricular sensing - R wave: 5.6-8 mV  Ventricular threshold: 1 V @ 0.4 ms  Ventricular lead impedance:   670 ohms     Diagnostic Data  Atrial paced: 99 %  Ventricular paced: 9.6 %    Episodes recorded since 11/27/2017  No atrial or ventricular high rate episodes recorded.  Numerous PVCs noted.    History of P-AF; anticoagulated with Xarelto.      Battery status: satisfactory   Estimated 30 months remaining, 2.75V      Final Parameters  Mode:  AAIR+  Lower rate: 60 bpm   Upper rate: 130 bpm  AV Delay: paced- 300 ms  Sensed-280 ms  Atrial - Amplitude: 2 V   Pulse width: 0.4 ms   Sensitivity: 0.18 mV     Ventricular - Amplitude: 2.25 V  Pulse width: 0.4 ms  Sensitivity: 2 mV    Changes made: Normal garfield atrial output increased to 2V based on threshold testing.  Conclusions: normal pacemaker function, stable pacing and sensing thresholds and adequate battery reserve    Follow up: 6 months

## 2018-08-06 ENCOUNTER — OFFICE VISIT (OUTPATIENT)
Dept: INTERNAL MEDICINE | Age: 83
End: 2018-08-06
Payer: MEDICARE

## 2018-08-06 VITALS
OXYGEN SATURATION: 98 % | SYSTOLIC BLOOD PRESSURE: 130 MMHG | HEART RATE: 71 BPM | WEIGHT: 164 LBS | DIASTOLIC BLOOD PRESSURE: 70 MMHG | HEIGHT: 60 IN | RESPIRATION RATE: 18 BRPM | BODY MASS INDEX: 32.2 KG/M2

## 2018-08-06 DIAGNOSIS — G47.33 OSA ON CPAP: ICD-10-CM

## 2018-08-06 DIAGNOSIS — E11.42 TYPE 2 DIABETES MELLITUS WITH DIABETIC POLYNEUROPATHY, WITHOUT LONG-TERM CURRENT USE OF INSULIN (HCC): ICD-10-CM

## 2018-08-06 DIAGNOSIS — E78.00 PURE HYPERCHOLESTEROLEMIA: ICD-10-CM

## 2018-08-06 DIAGNOSIS — E55.9 VITAMIN D DEFICIENCY: ICD-10-CM

## 2018-08-06 DIAGNOSIS — I10 ESSENTIAL HYPERTENSION: ICD-10-CM

## 2018-08-06 DIAGNOSIS — E03.9 ACQUIRED HYPOTHYROIDISM: ICD-10-CM

## 2018-08-06 DIAGNOSIS — I50.32 CHRONIC DIASTOLIC CONGESTIVE HEART FAILURE (HCC): ICD-10-CM

## 2018-08-06 DIAGNOSIS — I48.21 PERMANENT ATRIAL FIBRILLATION (HCC): ICD-10-CM

## 2018-08-06 DIAGNOSIS — Z99.89 OSA ON CPAP: ICD-10-CM

## 2018-08-06 DIAGNOSIS — Z00.00 MEDICARE ANNUAL WELLNESS VISIT, SUBSEQUENT: Primary | ICD-10-CM

## 2018-08-06 LAB
ALBUMIN SERPL-MCNC: 4.2 G/DL (ref 3.5–5.2)
ALP BLD-CCNC: 62 U/L (ref 35–104)
ALT SERPL-CCNC: 14 U/L (ref 5–33)
ANION GAP SERPL CALCULATED.3IONS-SCNC: 18 MMOL/L (ref 7–19)
AST SERPL-CCNC: 16 U/L (ref 5–32)
BACTERIA: NORMAL /HPF
BASOPHILS ABSOLUTE: 0 K/UL (ref 0–0.2)
BASOPHILS RELATIVE PERCENT: 0.5 % (ref 0–1)
BILIRUB SERPL-MCNC: 0.6 MG/DL (ref 0.2–1.2)
BILIRUBIN URINE: NEGATIVE
BLOOD, URINE: NEGATIVE
BUN BLDV-MCNC: 21 MG/DL (ref 8–23)
CALCIUM SERPL-MCNC: 9.1 MG/DL (ref 8.2–9.6)
CHLORIDE BLD-SCNC: 103 MMOL/L (ref 98–111)
CHOLESTEROL, TOTAL: 155 MG/DL (ref 160–199)
CLARITY: ABNORMAL
CO2: 24 MMOL/L (ref 22–29)
COLOR: YELLOW
CREAT SERPL-MCNC: 1.3 MG/DL (ref 0.5–0.9)
CREATININE URINE: 32.8 MG/DL (ref 4.2–622)
EOSINOPHILS ABSOLUTE: 0.1 K/UL (ref 0–0.6)
EOSINOPHILS RELATIVE PERCENT: 1.4 % (ref 0–5)
EPITHELIAL CELLS, UA: 1 /HPF (ref 0–5)
GFR NON-AFRICAN AMERICAN: 38
GLUCOSE BLD-MCNC: 109 MG/DL (ref 74–109)
GLUCOSE URINE: NEGATIVE MG/DL
HBA1C MFR BLD: 6 % (ref 4–6)
HCT VFR BLD CALC: 38.1 % (ref 37–47)
HDLC SERPL-MCNC: 75 MG/DL (ref 65–121)
HEMOGLOBIN: 12.1 G/DL (ref 12–16)
HYALINE CASTS: 2 /HPF (ref 0–8)
KETONES, URINE: NEGATIVE MG/DL
LDL CHOLESTEROL CALCULATED: 65 MG/DL
LEUKOCYTE ESTERASE, URINE: ABNORMAL
LYMPHOCYTES ABSOLUTE: 1.2 K/UL (ref 1.1–4.5)
LYMPHOCYTES RELATIVE PERCENT: 21.3 % (ref 20–40)
MCH RBC QN AUTO: 29 PG (ref 27–31)
MCHC RBC AUTO-ENTMCNC: 31.8 G/DL (ref 33–37)
MCV RBC AUTO: 91.4 FL (ref 81–99)
MICROALBUMIN UR-MCNC: <1.2 MG/DL (ref 0–19)
MICROALBUMIN/CREAT UR-RTO: NORMAL MG/G
MONOCYTES ABSOLUTE: 0.4 K/UL (ref 0–0.9)
MONOCYTES RELATIVE PERCENT: 7.5 % (ref 0–10)
NEUTROPHILS ABSOLUTE: 3.9 K/UL (ref 1.5–7.5)
NEUTROPHILS RELATIVE PERCENT: 68.9 % (ref 50–65)
NITRITE, URINE: NEGATIVE
PDW BLD-RTO: 14.6 % (ref 11.5–14.5)
PH UA: 7.5
PLATELET # BLD: 191 K/UL (ref 130–400)
PMV BLD AUTO: 9.8 FL (ref 9.4–12.3)
POTASSIUM SERPL-SCNC: 3.7 MMOL/L (ref 3.5–5)
PROTEIN UA: NEGATIVE MG/DL
RBC # BLD: 4.17 M/UL (ref 4.2–5.4)
RBC UA: 1 /HPF (ref 0–4)
SODIUM BLD-SCNC: 145 MMOL/L (ref 136–145)
SPECIFIC GRAVITY UA: 1.01
T4 FREE: 1.5 NG/DL (ref 0.9–1.7)
TOTAL PROTEIN: 7.1 G/DL (ref 6.6–8.7)
TRIGL SERPL-MCNC: 73 MG/DL (ref 0–149)
TSH SERPL DL<=0.05 MIU/L-ACNC: 3.02 UIU/ML (ref 0.27–4.2)
URINE REFLEX TO CULTURE: YES
UROBILINOGEN, URINE: 1 E.U./DL
VITAMIN D 25-HYDROXY: 24.2 NG/ML
WBC # BLD: 5.6 K/UL (ref 4.8–10.8)
WBC UA: 4 /HPF (ref 0–5)

## 2018-08-06 PROCEDURE — 4040F PNEUMOC VAC/ADMIN/RCVD: CPT | Performed by: INTERNAL MEDICINE

## 2018-08-06 PROCEDURE — G8427 DOCREV CUR MEDS BY ELIG CLIN: HCPCS | Performed by: INTERNAL MEDICINE

## 2018-08-06 PROCEDURE — G8417 CALC BMI ABV UP PARAM F/U: HCPCS | Performed by: INTERNAL MEDICINE

## 2018-08-06 PROCEDURE — 99214 OFFICE O/P EST MOD 30 MIN: CPT | Performed by: INTERNAL MEDICINE

## 2018-08-06 PROCEDURE — 1090F PRES/ABSN URINE INCON ASSESS: CPT | Performed by: INTERNAL MEDICINE

## 2018-08-06 PROCEDURE — 1123F ACP DISCUSS/DSCN MKR DOCD: CPT | Performed by: INTERNAL MEDICINE

## 2018-08-06 PROCEDURE — 1036F TOBACCO NON-USER: CPT | Performed by: INTERNAL MEDICINE

## 2018-08-06 PROCEDURE — G0439 PPPS, SUBSEQ VISIT: HCPCS | Performed by: INTERNAL MEDICINE

## 2018-08-06 PROCEDURE — G8598 ASA/ANTIPLAT THER USED: HCPCS | Performed by: INTERNAL MEDICINE

## 2018-08-06 PROCEDURE — 1101F PT FALLS ASSESS-DOCD LE1/YR: CPT | Performed by: INTERNAL MEDICINE

## 2018-08-06 RX ORDER — PRAVASTATIN SODIUM 40 MG
40 TABLET ORAL EVERY EVENING
Qty: 30 TABLET | Refills: 5 | Status: SHIPPED | OUTPATIENT
Start: 2018-08-06 | End: 2019-01-17 | Stop reason: SDUPTHER

## 2018-08-06 ASSESSMENT — LIFESTYLE VARIABLES: HOW OFTEN DO YOU HAVE A DRINK CONTAINING ALCOHOL: 0

## 2018-08-06 ASSESSMENT — PATIENT HEALTH QUESTIONNAIRE - PHQ9: SUM OF ALL RESPONSES TO PHQ QUESTIONS 1-9: 0

## 2018-08-06 ASSESSMENT — ENCOUNTER SYMPTOMS
SHORTNESS OF BREATH: 1
COUGH: 0
BLOOD IN STOOL: 0
SINUS PRESSURE: 0
SORE THROAT: 0
COLOR CHANGE: 0
CONSTIPATION: 0
EYE REDNESS: 0
VOICE CHANGE: 0
ABDOMINAL PAIN: 0
EYE PAIN: 0
DIARRHEA: 0
CHEST TIGHTNESS: 0
WHEEZING: 0
NAUSEA: 0
VOMITING: 0
TROUBLE SWALLOWING: 0

## 2018-08-06 ASSESSMENT — ANXIETY QUESTIONNAIRES: GAD7 TOTAL SCORE: 0

## 2018-08-06 NOTE — PROGRESS NOTES
Chief Complaint:   Debbie Hart is a 80 y.o. female who presents for complete physical exam.    History of Present Illness:      Patient is here for  [address]:  Dr. Vinnie Wang ophthalmology  Dr. Saira Kelly cardiology    PT 2 [facility]- NO ISSUES   ++COMPLAINTS ABOUT HEARING DEFICIT/ wears hearing aids  NO BEHAVIORAL OR PSYCHOSOCIAL RISKS DETECTED  DEPRESSION SCREEN REVIEWED    NO COGNITIVE DEFICIT DETECTED    _____________________________________________________________________    Pt presents today for follow-up and management of following chronic medical conditions:    Type 2 diabetes mellitus with diabetic polyneuropathy, without long-term current use of insulin ([address]- In trying to stay on sugar free diet; She has not had good appetite and has lost additional nearly 10 pounds     Acquired hypothyroidism- has been taking synthroid daily ( 50 micrograms)     Hypertension- blood pressure at home has been in good range and she has been taking all her blood pressure medications as prescribed- benicar 40 daily     Chronic diastolic congestive heart failure (HCC)  Permanent atrial fibrillation  - stable/ no leg swelling reported; has been compliant with her medications  She takes daily xarelto     Pure hypercholesterolemia- Patient  has tried to follow diet recommendations.  Has been taking  cholesterol lowering medication simvastatin 40 as prescribed and does not report any side effects.     ROSALES on CPAP= has been using her CPAP    Patient Active Problem List    Diagnosis Date Noted    Medicare annual wellness visit, subsequent 08/06/2018    Vitamin D deficiency 08/06/2018    Type 2 diabetes mellitus with diabetic polyneuropathy, without long-term current use of insulin ([address] 10/21/2017    Acquired hypothyroidism 10/21/2017    Mild mitral regurgitation 10/21/2017    Exogenous obesity 10/21/2017    Chronic diastolic TWICE A DAY WITH MEALS 180 tablet 1    amLODIPine (NORVASC) 10 MG tablet TAKE ONE TABLET BY MOUTH EVERY DAY 90 tablet 1    amitriptyline (ELAVIL) 25 MG tablet Take 1 tablet by mouth nightly 90 tablet 1    levothyroxine (SYNTHROID) 50 MCG tablet TAKE ONE TABLET BY MOUTH EVERY DAY 90 tablet 3    potassium chloride (KLOR-CON M) 20 MEQ extended release tablet TAKE ONE TABLET BY MOUTH TWICE A  tablet 3    acetaminophen (AMINOFEN) 325 MG tablet Take 2 tablets by mouth every 4 hours as needed for Pain 120 tablet 3     No current facility-administered medications for this visit. No Known Allergies    Social History     Social History    Marital status:      Spouse name: N/A    Number of children: N/A    Years of education: N/A     Social History Main Topics    Smoking status: Never Smoker    Smokeless tobacco: Never Used    Alcohol use No    Drug use: Unknown    Sexual activity: Not Asked     Other Topics Concern    None     Social History Narrative    None     No family history on file. Past Surgical History:   Procedure Laterality Date    APPENDECTOMY      BLADDER SURGERY      CHOLECYSTECTOMY      PACEMAKER PLACEMENT      PAT AND BSO           Lab Review   No visits with results within 2 Month(s) from this visit. Latest known visit with results is:   Office Visit on 03/06/2018   Component Date Value    Urine Culture, Routine 03/06/2018 *                    Value:>100,000 CFU/ml  Mixed skin nicolas present-Heavy growth      Organism 03/06/2018 Escherichia coli*    Urine Culture, Routine 03/06/2018 Moderate growth     Organism 03/06/2018 Enterobacter aerogenes*    Urine Culture, Routine 03/06/2018 Moderate growth          Review of Systems   Constitutional: Positive for fatigue. Negative for chills and fever. HENT: Negative for congestion, ear pain, postnasal drip, sinus pressure, sore throat, trouble swallowing and voice change.     Eyes: Negative for pain, redness and visual Abdominal: Soft. Bowel sounds are normal.   Musculoskeletal: Normal range of motion. Lymphadenopathy:     She has no cervical adenopathy. Neurological: She is oriented to person, place, and time. Skin: Skin is warm. No rash noted. ASSESSMENT/PLAN  MEDICARE WELLNESS SCREENING/ MAINTENANCE:  1:MAMMOGRAM na  PAP na  BONE DENSITY na  2. SCREENING CSCOPE na  3. CARDIOVASCULAR SCREENING- LIPID PANEL DONE  4. DIABETES SCREEN DONE - FBS =ABOVE LABS  5. PNEUMONIA VACCINATION has completed series  6. INFLUENZA VACCINATION: TO BE DONE IN FALL- pt will return  7. HEP B VACCINATION- PT DECLINES  8. HIV/ HEP SCREENING na      HEALTH PLAN:  1. HEALTHY DIET: low sugar  2. EXERCISE slow walks with walker + assistance recommended  3. FALL RISK SCREEN REVIEWED; NO INCREASED FALL RISK ON EXAM    Fall Risk:  Timed Up and Go Test > 12 seconds?: no  2 or more falls in past year?: no  Fall with injury in past year?: (!) yes  ( pt stubmled/ fell; uses aburto/ walker to prevent falls)    Depression:  PHQ-2 Score: 0    Cognitive:   Words recalled: 3  Clock Drawing Test (CDT) Score: Normal    ______________________________________________________________________    Management plan for chronic medical conditions:      Type 2 diabetes mellitus with diabetic polyneuropathy, without long-term current use of insulin (HCC)= her A1c is well controlled 6.0 ( 5.8), patient will continue strict diet control, no added sugar diet  Discussed the patient that she needs to try to avoid further weight loss     Acquired hypothyroidism- her thyroid levels are in good range, patient will continue current Synthroid dose 50 µg daily     Chronic diastolic congestive heart failure (HCC)  Hypertension  CKD  Blood pressure has been in good range, patient will continue current blood pressure medications  Her GFR is stable 38 ( 32), asked patient to increase her water intake, at this time continue same Lasix dose, monitor renal function closely     Pure

## 2018-08-09 LAB
ORGANISM: ABNORMAL
ORGANISM: ABNORMAL
URINE CULTURE, ROUTINE: ABNORMAL

## 2018-08-09 RX ORDER — CIPROFLOXACIN 250 MG/1
250 TABLET, FILM COATED ORAL 2 TIMES DAILY
Qty: 10 TABLET | Refills: 0 | Status: SHIPPED | OUTPATIENT
Start: 2018-08-09 | End: 2018-08-14

## 2018-08-20 RX ORDER — OLMESARTAN MEDOXOMIL 40 MG/1
TABLET ORAL
Qty: 30 TABLET | Refills: 7 | Status: SHIPPED | OUTPATIENT
Start: 2018-08-20 | End: 2018-12-06 | Stop reason: SDUPTHER

## 2018-08-20 RX ORDER — LEVOTHYROXINE SODIUM 0.05 MG/1
TABLET ORAL
Qty: 30 TABLET | Refills: 5 | Status: SHIPPED | OUTPATIENT
Start: 2018-08-20 | End: 2018-12-06 | Stop reason: SDUPTHER

## 2018-08-20 RX ORDER — DRONEDARONE 400 MG/1
TABLET, FILM COATED ORAL
Qty: 60 TABLET | Refills: 7 | OUTPATIENT
Start: 2018-08-20

## 2018-09-05 PROBLEM — Z00.00 MEDICARE ANNUAL WELLNESS VISIT, SUBSEQUENT: Status: RESOLVED | Noted: 2018-08-06 | Resolved: 2018-09-05

## 2018-09-18 RX ORDER — RANOLAZINE 500 MG/1
TABLET, FILM COATED, EXTENDED RELEASE ORAL
Qty: 180 TABLET | Refills: 1 | Status: SHIPPED | OUTPATIENT
Start: 2018-09-18 | End: 2019-02-22 | Stop reason: SDUPTHER

## 2018-09-18 RX ORDER — FUROSEMIDE 40 MG/1
TABLET ORAL
Qty: 45 TABLET | Refills: 11 | Status: SHIPPED | OUTPATIENT
Start: 2018-09-18 | End: 2018-12-06 | Stop reason: SDUPTHER

## 2018-11-15 RX ORDER — POTASSIUM CHLORIDE 20 MEQ/1
TABLET, EXTENDED RELEASE ORAL
Qty: 180 TABLET | Refills: 1 | Status: SHIPPED | OUTPATIENT
Start: 2018-11-15 | End: 2018-12-06 | Stop reason: SDUPTHER

## 2018-11-15 RX ORDER — CARVEDILOL 25 MG/1
TABLET ORAL
Qty: 180 TABLET | Refills: 1 | Status: SHIPPED | OUTPATIENT
Start: 2018-11-15 | End: 2019-02-21 | Stop reason: SDUPTHER

## 2018-11-15 NOTE — TELEPHONE ENCOUNTER
Leonila called requesting a refill of the below medication which has been pended for you:     Requested Prescriptions     Pending Prescriptions Disp Refills    potassium chloride (KLOR-CON M) 20 MEQ extended release tablet [Pharmacy Med Name: POTASSIUM CHLORIDE ADRI ER 20 TBCR] 180 tablet 1     Sig: TAKE ONE TABLET BY MOUTH TWICE A DAY    carvedilol (COREG) 25 MG tablet [Pharmacy Med Name: CARVEDILOL 25MG TABS] 180 tablet 1     Sig: TAKE ONE TABLET BY MOUTH TWICE A DAY WITH MEALS       Last Appointment Date: 8/6/2018  Next Appointment Date: 12/4/2018    No Known Allergies

## 2018-11-27 ENCOUNTER — CLINICAL SUPPORT (OUTPATIENT)
Dept: CARDIOLOGY | Facility: CLINIC | Age: 83
End: 2018-11-27

## 2018-11-27 ENCOUNTER — OFFICE VISIT (OUTPATIENT)
Dept: INTERNAL MEDICINE | Age: 83
End: 2018-11-27
Payer: MEDICARE

## 2018-11-27 VITALS
HEART RATE: 72 BPM | WEIGHT: 168 LBS | HEIGHT: 62 IN | OXYGEN SATURATION: 96 % | SYSTOLIC BLOOD PRESSURE: 136 MMHG | DIASTOLIC BLOOD PRESSURE: 62 MMHG | BODY MASS INDEX: 30.91 KG/M2 | RESPIRATION RATE: 18 BRPM

## 2018-11-27 DIAGNOSIS — Z48.02 VISIT FOR SUTURE REMOVAL: Primary | ICD-10-CM

## 2018-11-27 DIAGNOSIS — E78.00 PURE HYPERCHOLESTEROLEMIA: ICD-10-CM

## 2018-11-27 DIAGNOSIS — Z00.00 MEDICARE ANNUAL WELLNESS VISIT, SUBSEQUENT: ICD-10-CM

## 2018-11-27 DIAGNOSIS — S81.811D LACERATION OF RIGHT LOWER EXTREMITY EXCLUDING THIGH, SUBSEQUENT ENCOUNTER: ICD-10-CM

## 2018-11-27 DIAGNOSIS — E03.9 ACQUIRED HYPOTHYROIDISM: ICD-10-CM

## 2018-11-27 DIAGNOSIS — Z99.89 OSA ON CPAP: ICD-10-CM

## 2018-11-27 DIAGNOSIS — E11.42 TYPE 2 DIABETES MELLITUS WITH DIABETIC POLYNEUROPATHY, WITHOUT LONG-TERM CURRENT USE OF INSULIN (HCC): ICD-10-CM

## 2018-11-27 DIAGNOSIS — I50.32 CHRONIC DIASTOLIC CONGESTIVE HEART FAILURE (HCC): ICD-10-CM

## 2018-11-27 DIAGNOSIS — E55.9 VITAMIN D DEFICIENCY: ICD-10-CM

## 2018-11-27 DIAGNOSIS — I10 ESSENTIAL HYPERTENSION: ICD-10-CM

## 2018-11-27 DIAGNOSIS — I49.5 SSS (SICK SINUS SYNDROME) (HCC): ICD-10-CM

## 2018-11-27 DIAGNOSIS — I48.21 PERMANENT ATRIAL FIBRILLATION (HCC): ICD-10-CM

## 2018-11-27 DIAGNOSIS — G47.33 OSA ON CPAP: ICD-10-CM

## 2018-11-27 LAB
ALBUMIN SERPL-MCNC: 3.8 G/DL (ref 3.5–5.2)
ALP BLD-CCNC: 67 U/L (ref 35–104)
ALT SERPL-CCNC: 12 U/L (ref 5–33)
ANION GAP SERPL CALCULATED.3IONS-SCNC: 13 MMOL/L (ref 7–19)
AST SERPL-CCNC: 14 U/L (ref 5–32)
BILIRUB SERPL-MCNC: 0.5 MG/DL (ref 0.2–1.2)
BUN BLDV-MCNC: 20 MG/DL (ref 8–23)
CALCIUM SERPL-MCNC: 8.7 MG/DL (ref 8.2–9.6)
CHLORIDE BLD-SCNC: 105 MMOL/L (ref 98–111)
CHOLESTEROL, TOTAL: 163 MG/DL (ref 160–199)
CO2: 23 MMOL/L (ref 22–29)
CREAT SERPL-MCNC: 1.5 MG/DL (ref 0.5–0.9)
GFR NON-AFRICAN AMERICAN: 32
GLUCOSE BLD-MCNC: 112 MG/DL (ref 74–109)
HBA1C MFR BLD: 5.7 % (ref 4–6)
HDLC SERPL-MCNC: 65 MG/DL (ref 65–121)
LDL CHOLESTEROL CALCULATED: 82 MG/DL
POTASSIUM SERPL-SCNC: 4.2 MMOL/L (ref 3.5–5)
SODIUM BLD-SCNC: 141 MMOL/L (ref 136–145)
TOTAL PROTEIN: 7 G/DL (ref 6.6–8.7)
TRIGL SERPL-MCNC: 81 MG/DL (ref 0–149)
TSH SERPL DL<=0.05 MIU/L-ACNC: 3.42 UIU/ML (ref 0.27–4.2)
VITAMIN D 25-HYDROXY: 22.6 NG/ML

## 2018-11-27 PROCEDURE — G8598 ASA/ANTIPLAT THER USED: HCPCS | Performed by: INTERNAL MEDICINE

## 2018-11-27 PROCEDURE — 1090F PRES/ABSN URINE INCON ASSESS: CPT | Performed by: INTERNAL MEDICINE

## 2018-11-27 PROCEDURE — 1101F PT FALLS ASSESS-DOCD LE1/YR: CPT | Performed by: INTERNAL MEDICINE

## 2018-11-27 PROCEDURE — 4040F PNEUMOC VAC/ADMIN/RCVD: CPT | Performed by: INTERNAL MEDICINE

## 2018-11-27 PROCEDURE — 1036F TOBACCO NON-USER: CPT | Performed by: INTERNAL MEDICINE

## 2018-11-27 PROCEDURE — 93294 REM INTERROG EVL PM/LDLS PM: CPT | Performed by: PHYSICIAN ASSISTANT

## 2018-11-27 PROCEDURE — 99213 OFFICE O/P EST LOW 20 MIN: CPT | Performed by: INTERNAL MEDICINE

## 2018-11-27 PROCEDURE — 1123F ACP DISCUSS/DSCN MKR DOCD: CPT | Performed by: INTERNAL MEDICINE

## 2018-11-27 PROCEDURE — G8482 FLU IMMUNIZE ORDER/ADMIN: HCPCS | Performed by: INTERNAL MEDICINE

## 2018-11-27 PROCEDURE — G8427 DOCREV CUR MEDS BY ELIG CLIN: HCPCS | Performed by: INTERNAL MEDICINE

## 2018-11-27 PROCEDURE — G8417 CALC BMI ABV UP PARAM F/U: HCPCS | Performed by: INTERNAL MEDICINE

## 2018-11-27 PROCEDURE — 93296 REM INTERROG EVL PM/IDS: CPT | Performed by: PHYSICIAN ASSISTANT

## 2018-11-27 ASSESSMENT — ENCOUNTER SYMPTOMS
WHEEZING: 0
ABDOMINAL PAIN: 0
CONSTIPATION: 0
CHEST TIGHTNESS: 0
SORE THROAT: 0
COUGH: 0

## 2018-11-27 NOTE — PROGRESS NOTES
tobacco: Never Used    Alcohol use No    Drug use: Unknown    Sexual activity: Not Asked     Other Topics Concern    None     Social History Narrative    None     No family history on file. Review of Systems   Constitutional: Negative for chills, fatigue and fever. HENT: Negative for congestion, ear pain, nosebleeds, postnasal drip and sore throat. Respiratory: Negative for cough, chest tightness and wheezing. Cardiovascular: Negative for chest pain, palpitations and leg swelling. Gastrointestinal: Negative for abdominal pain and constipation. Genitourinary: Negative for dysuria and urgency. Musculoskeletal: Negative. Negative for arthralgias. Skin: Negative for rash. Sutures right leg   Neurological: Negative for dizziness and headaches. Psychiatric/Behavioral: Negative. Vitals:    11/27/18 1428   BP: 136/62   Site: Left Upper Arm   Position: Sitting   Cuff Size: Large Adult   Pulse: 72   Resp: 18   SpO2: 96%   Weight: 168 lb (76.2 kg)   Height: 5' 2\" (1.575 m)      Wt Readings from Last 3 Encounters:   11/27/18 168 lb (76.2 kg)   08/06/18 164 lb (74.4 kg)   03/06/18 158 lb (71.7 kg)   Body mass index is 30.73 kg/m². BP Readings from Last 3 Encounters:   11/27/18 136/62   08/06/18 130/70   03/06/18 138/60       Physical exam:  GENERAL: Patient is  In no acute distress. HEENT: External ear canals are normal, not erythematous with no discharge. Typmanic membranes are normal. Posterior pharynx is not erythematous, no postnasal drip is present. There is no significant pain on palpation over maxillary sinuses. NECK:There is NO significant palpable submandibular lymphadenopathy present . CHEST: On exam bilaterally  NO  rhonci auscultated. There is  NO expiratory wheezing or prolonged expiratory phase. There is NO significantwheezing or tightness when patient is coughing. CARDIOVASCULAR: Regular rate, no murmurs, no rubs or gallops present.   ABDOMEN: Soft, non-tender

## 2018-12-06 ENCOUNTER — OFFICE VISIT (OUTPATIENT)
Dept: INTERNAL MEDICINE | Age: 83
End: 2018-12-06
Payer: MEDICARE

## 2018-12-06 VITALS
OXYGEN SATURATION: 96 % | WEIGHT: 165.6 LBS | HEART RATE: 65 BPM | HEIGHT: 62 IN | SYSTOLIC BLOOD PRESSURE: 134 MMHG | DIASTOLIC BLOOD PRESSURE: 74 MMHG | BODY MASS INDEX: 30.47 KG/M2

## 2018-12-06 DIAGNOSIS — E78.00 PURE HYPERCHOLESTEROLEMIA: ICD-10-CM

## 2018-12-06 DIAGNOSIS — E03.9 ACQUIRED HYPOTHYROIDISM: ICD-10-CM

## 2018-12-06 DIAGNOSIS — E55.9 VITAMIN D DEFICIENCY: ICD-10-CM

## 2018-12-06 DIAGNOSIS — I10 ESSENTIAL HYPERTENSION: ICD-10-CM

## 2018-12-06 DIAGNOSIS — E11.42 TYPE 2 DIABETES MELLITUS WITH DIABETIC POLYNEUROPATHY, WITHOUT LONG-TERM CURRENT USE OF INSULIN (HCC): Primary | ICD-10-CM

## 2018-12-06 DIAGNOSIS — N18.30 STAGE 3 CHRONIC KIDNEY DISEASE (HCC): ICD-10-CM

## 2018-12-06 DIAGNOSIS — Z23 NEED FOR VACCINATION: ICD-10-CM

## 2018-12-06 PROCEDURE — G8427 DOCREV CUR MEDS BY ELIG CLIN: HCPCS | Performed by: INTERNAL MEDICINE

## 2018-12-06 PROCEDURE — 4040F PNEUMOC VAC/ADMIN/RCVD: CPT | Performed by: INTERNAL MEDICINE

## 2018-12-06 PROCEDURE — 1036F TOBACCO NON-USER: CPT | Performed by: INTERNAL MEDICINE

## 2018-12-06 PROCEDURE — 99214 OFFICE O/P EST MOD 30 MIN: CPT | Performed by: INTERNAL MEDICINE

## 2018-12-06 PROCEDURE — 1101F PT FALLS ASSESS-DOCD LE1/YR: CPT | Performed by: INTERNAL MEDICINE

## 2018-12-06 PROCEDURE — 1123F ACP DISCUSS/DSCN MKR DOCD: CPT | Performed by: INTERNAL MEDICINE

## 2018-12-06 PROCEDURE — G8482 FLU IMMUNIZE ORDER/ADMIN: HCPCS | Performed by: INTERNAL MEDICINE

## 2018-12-06 PROCEDURE — 1090F PRES/ABSN URINE INCON ASSESS: CPT | Performed by: INTERNAL MEDICINE

## 2018-12-06 PROCEDURE — G8598 ASA/ANTIPLAT THER USED: HCPCS | Performed by: INTERNAL MEDICINE

## 2018-12-06 PROCEDURE — G8417 CALC BMI ABV UP PARAM F/U: HCPCS | Performed by: INTERNAL MEDICINE

## 2018-12-06 RX ORDER — ERGOCALCIFEROL 1.25 MG/1
50000 CAPSULE ORAL WEEKLY
Qty: 12 CAPSULE | Refills: 1 | Status: SHIPPED | OUTPATIENT
Start: 2018-12-06 | End: 2019-02-21 | Stop reason: SDUPTHER

## 2018-12-06 ASSESSMENT — ENCOUNTER SYMPTOMS
WHEEZING: 0
ABDOMINAL PAIN: 0
COUGH: 0
CHEST TIGHTNESS: 0
CONSTIPATION: 0
SORE THROAT: 0

## 2018-12-06 NOTE — PROGRESS NOTES
Chief Complaint   Patient presents with    3 Month Follow-Up     History of presenting illness:  Glen Martinez is a80 y.o. female who presents today for follow up on her chronic medical conditions as noted below. Type 2 diabetes mellitus with diabetic polyneuropathy, without long-term current use of insulin ([address]- In trying to stay on sugar free diet; At her last appointment in August, 4 months ago, I had lost 10 pounds. Her weight since then has a stabilized, has not further lost any weight    HYPOthyroidism- has been taking synthroid daily ( 50 micrograms)     Hypertension- blood pressure at home has been in good range and she has been taking all her blood pressure medications as prescribed- benicar 40 daily     Chronic diastolic congestive heart failure (HCC)  Permanent atrial fibrillation  - stable/ no leg swelling reported; has been compliant with her medications  She takes daily xarelto     Pure hypercholesterolemia- Patient  has tried to follow diet recommendations.  Has been taking  cholesterol lowering medication simvastatin 40      ROSALES on CPAP= has been using her CPAP      Patient Active Problem List    Diagnosis Date Noted    Stage 3 chronic kidney disease ([address] 12/06/2018    Vitamin D deficiency 08/06/2018    Type 2 diabetes mellitus with diabetic polyneuropathy, without long-term current use of insulin ([address] 10/21/2017    Acquired hypothyroidism 10/21/2017    Mild mitral regurgitation 10/21/2017    Exogenous obesity 10/21/2017    Chronic diastolic congestive heart failure ([address] 10/21/2017     Overview Note:     Per echo 2016      Mild aortic regurgitation 10/21/2017    Obesity (BMI 30-39.9) 08/29/2017    Blister (nonthermal), left lower leg, initial encounter 07/25/2017    Chronic ulcer of left leg, limited to breakdown of skin ([address] 07/25/2017    Coronary artery disease due to calcified coronary lesion 07/25/2017     Overview Note:     Post previous stents( dr Mario Yanes)      ROSALES on EVERY DAY 90 tablet 1    amitriptyline (ELAVIL) 25 MG tablet Take 1 tablet by mouth nightly 90 tablet 1    levothyroxine (SYNTHROID) 50 MCG tablet TAKE ONE TABLET BY MOUTH EVERY DAY 90 tablet 3    potassium chloride (KLOR-CON M) 20 MEQ extended release tablet TAKE ONE TABLET BY MOUTH TWICE A DAY (Patient taking differently: TAKE ONE TABLET BY MOUTH TWICE A DAY ON [place]. Take one tablet by mouth all other days) 180 tablet 3    acetaminophen (AMINOFEN) 325 MG tablet Take 2 tablets by mouth every 4 hours as needed for Pain 120 tablet 3     No current facility-administered medications for this visit. No Known Allergies  Social History   Substance Use Topics    Smoking status: Never Smoker    Smokeless tobacco: Never Used    Alcohol use No      No family history on file. Review of Systems   Constitutional: Positive for fatigue. Negative for chills and fever. HENT: Positive for postnasal drip. Negative for congestion, ear pain, nosebleeds and sore throat. Respiratory: Negative for cough, chest tightness and wheezing. Cardiovascular: Negative for chest pain, palpitations and leg swelling. Gastrointestinal: Negative for abdominal pain and constipation. Genitourinary: Negative for dysuria and urgency. Musculoskeletal: Positive for arthralgias. Skin: Negative for rash. Neurological: Negative for dizziness and headaches. Psychiatric/Behavioral: Negative. Vitals:    12/06/18 1141   BP: 134/74   Pulse: 65   SpO2: 96%   Weight: 165 lb 9.6 oz (75.1 kg)   Height: 5' 2\" (1.575 m)     Body mass index is 30.29 kg/m². Physical Exam   Constitutional: She is oriented to person, place, and time. She appears well-developed and well-nourished. HENT:   Right Ear: External ear normal.   Left Ear: External ear normal.   Mouth/Throat: Oropharyngeal exudate present. Eyes: Pupils are equal, round, and reactive to light. Conjunctivae are normal.   Neck: Neck supple. No JVD present.

## 2018-12-11 NOTE — PROGRESS NOTES
Dual Chamber Pacemaker Evaluation Report  [facility]    December 10, 2018    Primary Cardiologist: Sharath  : Medtronic Model: Adapta  Implant date: 6/3/09    Reason for evaluation: routine  Indication for pacemaker: sick sinus syndrome    Measurements  Atrial sensing - P wave: n/r  Atrial threshold: 0.625 V@ 0.4 ms  Atrial lead impedance: 557 ohms  Ventricular sensing - R wave: <5.6- mV  Ventricular threshold: 1.125 V @ 0.4 ms  Ventricular lead impedance:   697 ohms     Diagnostic Data  Atrial paced: 98.0 %  Ventricular paced: 20.2 %  Other: no new episodes noted  Battery status: satisfactory         Final Parameters  Mode:  AAIR+  Lower rate: 60 bpm   Upper rate: 130 bpm  AV Delay: paced- 300 ms  Sensed-280 ms  Atrial - Amplitude: 1.5 V   Pulse width: 0.4 ms   Sensitivity: 0.18 mV     Ventricular - Amplitude: 2.25 V  Pulse width: 0.4 ms  Sensitivity: 2.8 mV    Changes made: n/a  Conclusions: normal pacemaker function    Follow up: 3 months

## 2018-12-18 RX ORDER — RIVAROXABAN 15 MG/1
TABLET, FILM COATED ORAL
Qty: 90 TABLET | Refills: 1 | Status: SHIPPED | OUTPATIENT
Start: 2018-12-18 | End: 2019-04-25 | Stop reason: SDUPTHER

## 2018-12-18 RX ORDER — AMLODIPINE BESYLATE 10 MG/1
TABLET ORAL
Qty: 90 TABLET | Refills: 1 | Status: SHIPPED | OUTPATIENT
Start: 2018-12-18 | End: 2019-04-25 | Stop reason: SDUPTHER

## 2018-12-18 NOTE — TELEPHONE ENCOUNTER
Leonila called requesting a refill of the below medication which has been pended for you:     Requested Prescriptions     Pending Prescriptions Disp Refills    XARELTO 15 MG TABS tablet [Pharmacy Med Name: XARELTO 15MG TABS] 90 tablet 1     Sig: TAKE ONE TABLET BY MOUTH EVERY DAY    amLODIPine (NORVASC) 10 MG tablet [Pharmacy Med Name: AMLODIPINE BESYLATE 10MG TABS] 90 tablet 1     Sig: TAKE ONE TABLET BY MOUTH EVERY DAY       Last Appointment Date: 12/6/2018  Next Appointment Date: 4/8/2019    No Known Allergies

## 2019-01-17 RX ORDER — PRAVASTATIN SODIUM 40 MG
TABLET ORAL
Qty: 30 TABLET | Refills: 5 | Status: SHIPPED | OUTPATIENT
Start: 2019-01-17 | End: 2019-07-23 | Stop reason: SDUPTHER

## 2019-02-21 RX ORDER — ERGOCALCIFEROL 1.25 MG/1
CAPSULE ORAL
Qty: 12 CAPSULE | Refills: 1 | Status: SHIPPED | OUTPATIENT
Start: 2019-02-21 | End: 2019-08-08 | Stop reason: SDUPTHER

## 2019-02-21 RX ORDER — LEVOTHYROXINE SODIUM 0.05 MG/1
TABLET ORAL
Qty: 30 TABLET | Refills: 5 | Status: SHIPPED | OUTPATIENT
Start: 2019-02-21 | End: 2019-04-08 | Stop reason: CLARIF

## 2019-02-21 RX ORDER — CARVEDILOL 25 MG/1
TABLET ORAL
Qty: 180 TABLET | Refills: 1 | Status: SHIPPED | OUTPATIENT
Start: 2019-02-21 | End: 2019-08-08 | Stop reason: SDUPTHER

## 2019-02-21 RX ORDER — DRONEDARONE 400 MG/1
TABLET, FILM COATED ORAL
Qty: 180 TABLET | Refills: 1 | Status: SHIPPED | OUTPATIENT
Start: 2019-02-21 | End: 2019-07-23 | Stop reason: SDUPTHER

## 2019-02-21 RX ORDER — POTASSIUM CHLORIDE 20 MEQ/1
TABLET, EXTENDED RELEASE ORAL
Qty: 180 TABLET | Refills: 1 | Status: SHIPPED | OUTPATIENT
Start: 2019-02-21 | End: 2019-04-08 | Stop reason: CLARIF

## 2019-02-22 RX ORDER — OLMESARTAN MEDOXOMIL 40 MG/1
TABLET ORAL
Qty: 30 TABLET | Refills: 7 | Status: SHIPPED | OUTPATIENT
Start: 2019-02-22 | End: 2019-04-08 | Stop reason: CLARIF

## 2019-02-22 RX ORDER — RANOLAZINE 500 MG/1
TABLET, FILM COATED, EXTENDED RELEASE ORAL
Qty: 180 TABLET | Refills: 1 | Status: SHIPPED | OUTPATIENT
Start: 2019-02-22 | End: 2019-07-23 | Stop reason: SDUPTHER

## 2019-04-03 DIAGNOSIS — E78.00 PURE HYPERCHOLESTEROLEMIA: ICD-10-CM

## 2019-04-03 DIAGNOSIS — E11.42 TYPE 2 DIABETES MELLITUS WITH DIABETIC POLYNEUROPATHY, WITHOUT LONG-TERM CURRENT USE OF INSULIN (HCC): ICD-10-CM

## 2019-04-03 DIAGNOSIS — E55.9 VITAMIN D DEFICIENCY: ICD-10-CM

## 2019-04-03 DIAGNOSIS — I10 ESSENTIAL HYPERTENSION: ICD-10-CM

## 2019-04-03 DIAGNOSIS — N18.30 STAGE 3 CHRONIC KIDNEY DISEASE (HCC): ICD-10-CM

## 2019-04-03 DIAGNOSIS — E03.9 ACQUIRED HYPOTHYROIDISM: ICD-10-CM

## 2019-04-03 LAB
ALBUMIN SERPL-MCNC: 4 G/DL (ref 3.5–5.2)
ALP BLD-CCNC: 55 U/L (ref 35–104)
ALT SERPL-CCNC: 15 U/L (ref 5–33)
ANION GAP SERPL CALCULATED.3IONS-SCNC: 13 MMOL/L (ref 7–19)
AST SERPL-CCNC: 13 U/L (ref 5–32)
BILIRUB SERPL-MCNC: 0.5 MG/DL (ref 0.2–1.2)
BUN BLDV-MCNC: 17 MG/DL (ref 8–23)
CALCIUM SERPL-MCNC: 8.9 MG/DL (ref 8.2–9.6)
CHLORIDE BLD-SCNC: 106 MMOL/L (ref 98–111)
CHOLESTEROL, TOTAL: 163 MG/DL (ref 160–199)
CO2: 26 MMOL/L (ref 22–29)
CREAT SERPL-MCNC: 1.3 MG/DL (ref 0.5–0.9)
GFR NON-AFRICAN AMERICAN: 38
GLUCOSE BLD-MCNC: 108 MG/DL (ref 74–109)
HBA1C MFR BLD: 6.2 % (ref 4–6)
HDLC SERPL-MCNC: 75 MG/DL (ref 65–121)
LDL CHOLESTEROL CALCULATED: 77 MG/DL
PARATHYROID HORMONE INTACT: 71.8 PG/ML (ref 15–65)
PHOSPHORUS: 3.8 MG/DL (ref 2.5–4.5)
POTASSIUM SERPL-SCNC: 4.1 MMOL/L (ref 3.5–5)
SODIUM BLD-SCNC: 145 MMOL/L (ref 136–145)
TOTAL PROTEIN: 6.8 G/DL (ref 6.6–8.7)
TRIGL SERPL-MCNC: 55 MG/DL (ref 0–149)
TSH SERPL DL<=0.05 MIU/L-ACNC: 2.17 UIU/ML (ref 0.27–4.2)
VITAMIN D 25-HYDROXY: 52.3 NG/ML

## 2019-04-08 ENCOUNTER — OFFICE VISIT (OUTPATIENT)
Dept: INTERNAL MEDICINE | Age: 84
End: 2019-04-08
Payer: MEDICARE

## 2019-04-08 VITALS
HEIGHT: 62 IN | WEIGHT: 165 LBS | BODY MASS INDEX: 30.36 KG/M2 | DIASTOLIC BLOOD PRESSURE: 60 MMHG | HEART RATE: 68 BPM | RESPIRATION RATE: 18 BRPM | SYSTOLIC BLOOD PRESSURE: 118 MMHG | OXYGEN SATURATION: 98 %

## 2019-04-08 DIAGNOSIS — Z99.89 OSA ON CPAP: Primary | ICD-10-CM

## 2019-04-08 DIAGNOSIS — E55.9 VITAMIN D DEFICIENCY: ICD-10-CM

## 2019-04-08 DIAGNOSIS — N18.30 STAGE 3 CHRONIC KIDNEY DISEASE (HCC): ICD-10-CM

## 2019-04-08 DIAGNOSIS — E78.00 PURE HYPERCHOLESTEROLEMIA: ICD-10-CM

## 2019-04-08 DIAGNOSIS — Z99.89 CPAP (CONTINUOUS POSITIVE AIRWAY PRESSURE) DEPENDENCE: ICD-10-CM

## 2019-04-08 DIAGNOSIS — E03.9 ACQUIRED HYPOTHYROIDISM: ICD-10-CM

## 2019-04-08 DIAGNOSIS — I50.32 CHRONIC DIASTOLIC CONGESTIVE HEART FAILURE (HCC): ICD-10-CM

## 2019-04-08 DIAGNOSIS — G47.33 OSA ON CPAP: Primary | ICD-10-CM

## 2019-04-08 DIAGNOSIS — E11.42 TYPE 2 DIABETES MELLITUS WITH DIABETIC POLYNEUROPATHY, WITHOUT LONG-TERM CURRENT USE OF INSULIN (HCC): ICD-10-CM

## 2019-04-08 PROCEDURE — 4040F PNEUMOC VAC/ADMIN/RCVD: CPT | Performed by: INTERNAL MEDICINE

## 2019-04-08 PROCEDURE — 1036F TOBACCO NON-USER: CPT | Performed by: INTERNAL MEDICINE

## 2019-04-08 PROCEDURE — 99214 OFFICE O/P EST MOD 30 MIN: CPT | Performed by: INTERNAL MEDICINE

## 2019-04-08 PROCEDURE — G8417 CALC BMI ABV UP PARAM F/U: HCPCS | Performed by: INTERNAL MEDICINE

## 2019-04-08 PROCEDURE — G8598 ASA/ANTIPLAT THER USED: HCPCS | Performed by: INTERNAL MEDICINE

## 2019-04-08 PROCEDURE — 1123F ACP DISCUSS/DSCN MKR DOCD: CPT | Performed by: INTERNAL MEDICINE

## 2019-04-08 PROCEDURE — 1090F PRES/ABSN URINE INCON ASSESS: CPT | Performed by: INTERNAL MEDICINE

## 2019-04-08 PROCEDURE — G8427 DOCREV CUR MEDS BY ELIG CLIN: HCPCS | Performed by: INTERNAL MEDICINE

## 2019-04-08 RX ORDER — POTASSIUM CHLORIDE 20 MEQ/1
TABLET, EXTENDED RELEASE ORAL
Qty: 90 TABLET | Refills: 3 | Status: SHIPPED | OUTPATIENT
Start: 2019-04-08 | End: 2019-12-09 | Stop reason: CLARIF

## 2019-04-08 ASSESSMENT — ENCOUNTER SYMPTOMS
ABDOMINAL PAIN: 0
SORE THROAT: 0
CONSTIPATION: 0
COUGH: 0
CHEST TIGHTNESS: 0
WHEEZING: 0

## 2019-04-08 ASSESSMENT — PATIENT HEALTH QUESTIONNAIRE - PHQ9
1. LITTLE INTEREST OR PLEASURE IN DOING THINGS: 0
SUM OF ALL RESPONSES TO PHQ QUESTIONS 1-9: 0
SUM OF ALL RESPONSES TO PHQ9 QUESTIONS 1 & 2: 0
SUM OF ALL RESPONSES TO PHQ QUESTIONS 1-9: 0
2. FEELING DOWN, DEPRESSED OR HOPELESS: 0

## 2019-04-08 NOTE — PROGRESS NOTES
breakdown of skin ([facility] 07/25/2017    Coronary artery disease due to calcified coronary lesion 07/25/2017     Overview Note:     Post previous stents( dr Geni Swanson)      ROSALES on CPAP      Overview Note:     AHI:  15.3      CPAP (continuous positive airway pressure) dependence      Overview Note:     9cm      Left carotid bruit     Essential hypertension     Permanent atrial fibrillation (HCC)     Pure hypercholesterolemia      Past Medical History:   Diagnosis Date    [facility]fib ([facility]     CPAP (continuous positive airway pressure) dependence     9cm    DM (diabetes mellitus screen)     Hyperlipidemia     Hypertension     Hyperthyroidism     Left carotid bruit     Macular degeneration     Obstructive sleep apnea     AHI:  15.3      Past Surgical History:   Procedure Laterality Date    APPENDECTOMY      BLADDER SURGERY      CHOLECYSTECTOMY      PACEMAKER PLACEMENT      PAT AND BSO       Current Outpatient Medications   Medication Sig Dispense Refill    potassium chloride (KLOR-CON M) 20 MEQ extended release tablet Take 1 tablet daily 90 tablet 3    RANEXA 500 MG extended release tablet TAKE ONE TABLET BY MOUTH TWICE A  tablet 1    vitamin D (ERGOCALCIFEROL) 40161 units CAPS capsule TAKE ONE CAPSULE BY MOUTH ONCE PER WEEK 12 capsule 1    carvedilol (COREG) 25 MG tablet TAKE ONE TABLET BY MOUTH TWICE A DAY WITH MEALS 180 tablet 1    MULTAQ 400 MG TABS TAKE ONE TABLET BY MOUTH TWICE A DAY WITH MORNING AND EVENING MEAL 180 tablet 1    pravastatin (PRAVACHOL) 40 MG tablet TAKE ONE TABLET BY MOUTH EVERY DAY IN THE EVENING 30 tablet 5    amLODIPine (NORVASC) 10 MG tablet TAKE ONE TABLET BY MOUTH EVERY DAY 90 tablet 1    olmesartan (BENICAR) 40 MG tablet TAKE ONE TABLET BY MOUTH EVERY DAY 90 tablet 1    glucose blood VI test strips (FREESTYLE LITE) strip USE ONE STRIP ONCE DAILY 50 strip 11    furosemide (LASIX) 40 MG tablet TAKE ONE TABLET BY MOUTH EVERY DAY AND EXTRA TABLET TWICE WEEKLY AS NEEDED 135 tablet 1    amitriptyline (ELAVIL) 25 MG tablet Take 1 tablet by mouth nightly 90 tablet 1    levothyroxine (SYNTHROID) 50 MCG tablet TAKE ONE TABLET BY MOUTH EVERY DAY 90 tablet 3    acetaminophen (AMINOFEN) 325 MG tablet Take 2 tablets by mouth every 4 hours as needed for Pain 120 tablet 3    XARELTO 15 MG TABS tablet TAKE ONE TABLET BY MOUTH EVERY DAY 90 tablet 1     No current facility-administered medications for this visit. No Known Allergies  Social History     Tobacco Use    Smoking status: Never Smoker    Smokeless tobacco: Never Used   Substance Use Topics    Alcohol use: No      No family history on file. Review of Systems   Constitutional: Positive for fatigue. Negative for chills and fever. HENT: Negative for congestion, ear pain, nosebleeds, postnasal drip and sore throat. Respiratory: Negative for cough, chest tightness and wheezing. Cardiovascular: Positive for leg swelling. Negative for chest pain and palpitations. Gastrointestinal: Negative for abdominal pain and constipation. Genitourinary: Negative for dysuria and urgency. Musculoskeletal: Positive for arthralgias. Skin: Negative for rash. Neurological: Negative for dizziness and headaches. Psychiatric/Behavioral: Negative. Vitals:    04/08/19 1127   BP: 118/60   Site: Left Upper Arm   Position: Sitting   Cuff Size: Large Adult   Pulse: 68   Resp: 18   SpO2: 98%   Weight: 165 lb (74.8 kg)   Height: 5' 2\" (1.575 m)     Body mass index is 30.18 kg/m². Physical Exam   Constitutional: She is oriented to person, place, and time. She appears well-developed and well-nourished. HENT:   Right Ear: External ear normal.   Left Ear: External ear normal.   Mouth/Throat: Oropharyngeal exudate present. Eyes: Pupils are equal, round, and reactive to light. Conjunctivae are normal.   Neck: Neck supple. No JVD present. No thyromegaly present. Cardiovascular: Normal heart sounds.    No murmur heard.  irregular  ankle edema nithin 1+  Wearing nithin FOREST's   Pulmonary/Chest: No respiratory distress. She has no wheezes. She has rales. She exhibits no tenderness. Abdominal: Soft. Bowel sounds are normal.   Lymphadenopathy:     She has no cervical adenopathy. Neurological: She is oriented to person, place, and time. Skin: Skin is warm. No rash noted.        Lab Review   Orders Only on 04/03/2019   Component Date Value    Phosphorus 04/03/2019 3.8     Hemoglobin A1C 04/03/2019 6.2*    Sodium 04/03/2019 145     Potassium 04/03/2019 4.1     Chloride 04/03/2019 106     CO2 04/03/2019 26     Anion Gap 04/03/2019 13     Glucose 04/03/2019 108     BUN 04/03/2019 17     CREATININE 04/03/2019 1.3*    GFR Non- 04/03/2019 38*    Calcium 04/03/2019 8.9     Total Protein 04/03/2019 6.8     Alb 04/03/2019 4.0     Total Bilirubin 04/03/2019 0.5     Alkaline Phosphatase 04/03/2019 55     ALT 04/03/2019 15     AST 04/03/2019 13     Cholesterol, Total 04/03/2019 163     Triglycerides 04/03/2019 55     HDL 04/03/2019 75     LDL Calculated 04/03/2019 77     TSH 04/03/2019 2.170     PTH 04/03/2019 71.8*    Vit D, 25-Hydroxy 04/03/2019 52.3            ASSESSMENT/PLAN:  Type 2 diabetes mellitus with diabetic polyneuropathy, without long-term current use of insulin (HCC)= her A1c is well controlled 6.2 (5.7 ( 6.0) ( 5.8), patient will continue strict diet control, no added sugar diet  Discussed the patient that she needs to try to avoid further weight loss    CAD/ PAF  Permanent afib  Cont current rx xarelto  stable     Acquired hypothyroidism- her thyroid levels are in good range, patient will continue current Synthroid dose 50 µg daily     Chronic diastolic congestive heart failure (HCC)  Hypertension  CKD  Blood pressure has been in good range, patient will continue current blood pressure medication- benicar 40 + amlodipine 10 daily  Her GFR is declined 38 (32 )(38 )( 32), asked patient to increase her water intake, at this time continue same Lasix dose, monitor renal function closely     Pure hypercholesterolemia- her LDL level is well, 77 (82) (65) triglycerides are good, change to pravastatin 40 ( pt on  amlodpipine 10)      ROSALES on CPAP-   Equipement is old- 9 yrs old  Water tank leaks  Mask poorly fitting  RX for new CPAP       Vit d deficiency  Level better 52(22 )(24)  Cont  D 50,000 IU weekly             Orders Placed This Encounter   Procedures    CBC Auto Differential    Comprehensive Metabolic Panel    Hemoglobin A1C    Lipid Panel    Urinalysis    TSH without Reflex    Vitamin D 25 Hydroxy    Microalbumin / Creatinine Urine Ratio    DME Order for CPAP as OP     New Prescriptions    No medications on file         Return in about 4 months (around 8/8/2019) for Annual Physical.   There are no Patient Instructions on file for this visit. EMR Dragon/transcription disclaimer:Significant part of this  encounter note is electronic transcription/translationof spoken language to printed text. The electronic translation of spoken language may be erroneous, or at times, nonsensical words or phrases may be inadvertently transcribed.  Although I have reviewed the note for sucherrors, some may still exist.

## 2019-04-25 RX ORDER — RIVAROXABAN 15 MG/1
TABLET, FILM COATED ORAL
Qty: 90 TABLET | Refills: 1 | Status: SHIPPED | OUTPATIENT
Start: 2019-04-25 | End: 2019-08-08 | Stop reason: SDUPTHER

## 2019-04-25 RX ORDER — AMLODIPINE BESYLATE 10 MG/1
TABLET ORAL
Qty: 90 TABLET | Refills: 1 | Status: SHIPPED | OUTPATIENT
Start: 2019-04-25 | End: 2019-08-08 | Stop reason: SDUPTHER

## 2019-04-25 NOTE — TELEPHONE ENCOUNTER
Leonila called requesting a refill of the below medication which has been pended for you:     Requested Prescriptions     Pending Prescriptions Disp Refills    amLODIPine (NORVASC) 10 MG tablet [Pharmacy Med Name: AMLODIPINE BESYLATE 10MG TABS] 90 tablet 1     Sig: TAKE ONE TABLET BY MOUTH EVERY DAY       Last Appointment Date: 4/8/2019  Next Appointment Date: 8/8/2019    No Known Allergies

## 2019-07-17 ENCOUNTER — OFFICE VISIT (OUTPATIENT)
Dept: CARDIOLOGY | Facility: CLINIC | Age: 84
End: 2019-07-17

## 2019-07-17 ENCOUNTER — CLINICAL SUPPORT NO REQUIREMENTS (OUTPATIENT)
Dept: CARDIOLOGY | Facility: CLINIC | Age: 84
End: 2019-07-17

## 2019-07-17 VITALS
DIASTOLIC BLOOD PRESSURE: 67 MMHG | SYSTOLIC BLOOD PRESSURE: 110 MMHG | HEART RATE: 61 BPM | WEIGHT: 165 LBS | HEIGHT: 60 IN | BODY MASS INDEX: 32.39 KG/M2

## 2019-07-17 DIAGNOSIS — I49.5 SSS (SICK SINUS SYNDROME) (HCC): ICD-10-CM

## 2019-07-17 DIAGNOSIS — E78.2 MIXED HYPERLIPIDEMIA: ICD-10-CM

## 2019-07-17 DIAGNOSIS — Z95.0 PACEMAKER: Primary | ICD-10-CM

## 2019-07-17 DIAGNOSIS — I10 ESSENTIAL HYPERTENSION: ICD-10-CM

## 2019-07-17 DIAGNOSIS — I48.0 PAROXYSMAL ATRIAL FIBRILLATION (HCC): ICD-10-CM

## 2019-07-17 DIAGNOSIS — I25.10 CORONARY ARTERY DISEASE INVOLVING NATIVE CORONARY ARTERY OF NATIVE HEART WITHOUT ANGINA PECTORIS: Primary | ICD-10-CM

## 2019-07-17 DIAGNOSIS — Z95.0 PACEMAKER: ICD-10-CM

## 2019-07-17 PROCEDURE — 99213 OFFICE O/P EST LOW 20 MIN: CPT | Performed by: INTERNAL MEDICINE

## 2019-07-17 PROCEDURE — 93000 ELECTROCARDIOGRAM COMPLETE: CPT | Performed by: INTERNAL MEDICINE

## 2019-07-17 PROCEDURE — 93288 INTERROG EVL PM/LDLS PM IP: CPT | Performed by: INTERNAL MEDICINE

## 2019-07-17 RX ORDER — ERGOCALCIFEROL 1.25 MG/1
50000 CAPSULE ORAL WEEKLY
COMMUNITY

## 2019-07-17 RX ORDER — PRAVASTATIN SODIUM 40 MG
40 TABLET ORAL DAILY
COMMUNITY

## 2019-07-17 NOTE — PROGRESS NOTES
Dual Chamber Pacemaker Evaluation Report  IN OFFICE INTERROGATION    July 17, 2019    Primary Cardiologist: Sharath  : Medtronic Model: Adapta ADDR01  Implant date: 6/3/2009    Reason for evaluation: routine  Indication for pacemaker: sick sinus syndrome    Measurements  Atrial sensing - P wave: >/= 80% paced  Atrial threshold: 0.75 V @ 0.4 ms  Atrial lead impedance: 572 ohms  Ventricular sensing - R wave: <5.6 - 16 mV  Ventricular threshold: 1 V @ 0.4 ms  Ventricular lead impedance:   704 ohms     Diagnostic Data  Atrial paced: 97.7 %  Ventricular paced: 19.8 %    Episodes recorded since 11/27/2018:  No episodes recorded.    Battery status: satisfactory   2.74V, estimated 25 months remaining      Final Parameters  Mode:  AAIR+  Lower rate: 60 bpm   Upper rate: 130 bpm  AV Delay: paced- 300 ms  Sensed-280 ms  Atrial - Amplitude: 1.5 V   Pulse width: 0.4 ms   Sensitivity: 0.18 mV     Ventricular - Amplitude: 2 V  Pulse width: 0.4 ms  Sensitivity: 2.8 mV    Changes made: No changes made.  Conclusions: normal pacemaker function, stable pacing thresholds and adequate battery reserve    Follow up: 6 months via [employer], annually in office (7/21/2020)

## 2019-07-17 NOTE — PROGRESS NOTES
Subjective    Juarez Giles is a 89 y.o. female. Fu of ihd, risks and rhythm    History of Present Illness     SSS / PAF / PERM PACER:  Has no palpitations. Pacer interrogation today shows no arrhythmias and ap/=98/20. EKG is 100% ap and nsc. Is compliant with meds and has no bleeding probs with DOAC.    IHD:  Is active for her age and has no cp with her ADL's. meds ok. Stamina stable    HTN:  Very good control by clinic checks and no light-headedness    HLD:  Good control with potent statin 4/19 - LDL=77, HDL=75, TGL=55        The following portions of the patient's history were reviewed and updated as appropriate: allergies, current medications, past family history, past medical history, past social history, past surgical history and problem list.    Patient Active Problem List   Diagnosis   • Paroxysmal atrial fibrillation (CMS/HCC)   • SSS (sick sinus syndrome) (CMS/HCC)   • Coronary artery disease   • Hypertension   • Lower leg edema   • Chronic anticoagulation   • Hx of right coronary artery stent placement   • Pacemaker   • Mixed hyperlipidemia   • Class 1 obesity due to excess calories with serious comorbidity and body mass index (BMI) of 32.0 to 32.9 in adult   • Chronic diastolic congestive heart failure (CMS/HCC)   • Prolonged Q-T interval on ECG       No Known Allergies    Family History   Problem Relation Age of Onset   • Cancer Mother    • Tuberculosis Father    • Coronary artery disease Brother    • Heart disease Sister    • Heart disease Sister        Social History     Socioeconomic History   • Marital status:      Spouse name: Not on file   • Number of children: Not on file   • Years of education: Not on file   • Highest education level: Not on file   Tobacco Use   • Smoking status: Never Smoker   • Smokeless tobacco: Never Used   Substance and Sexual Activity   • Alcohol use: No   • Drug use: No   • Sexual activity: Defer         Current Outpatient Medications:   •  aspirin 81 MG EC  tablet, Take 81 mg by mouth daily., Disp: , Rfl:   •  carvedilol (COREG) 25 MG tablet, Take 25 mg by mouth 2 (two) times a day with meals., Disp: , Rfl:   •  dronedarone (MULTAQ) 400 MG tablet, Take 400 mg by mouth 2 (two) times a day with meals., Disp: , Rfl:   •  furosemide (LASIX) 40 MG tablet, Take 40 mg by mouth 2 (two) times a day., Disp: , Rfl:   •  levothyroxine (SYNTHROID, LEVOTHROID) 50 MCG tablet, Take 50 mcg by mouth daily., Disp: , Rfl:   •  olmesartan-hydrochlorothiazide (BENICAR HCT) 40-12.5 MG per tablet, Take 1 tablet by mouth daily., Disp: , Rfl:   •  potassium chloride (K-DUR,KLOR-CON) 20 MEQ CR tablet, Take 20 mEq by mouth 2 (two) times a day., Disp: , Rfl:   •  pravastatin (PRAVACHOL) 40 MG tablet, Take 40 mg by mouth Daily., Disp: , Rfl:   •  ranolazine (RANEXA) 500 MG 12 hr tablet, Take 500 mg by mouth 2 (two) times a day., Disp: , Rfl:   •  rivaroxaban (XARELTO) 15 MG tablet, Take 15 mg by mouth daily., Disp: , Rfl:   •  vitamin D (ERGOCALCIFEROL) 64920 units capsule capsule, Take 50,000 Units by mouth 1 (One) Time Per Week., Disp: , Rfl:   •  simvastatin (ZOCOR) 40 MG tablet, Take 40 mg by mouth every night., Disp: , Rfl:     Past Surgical History:   Procedure Laterality Date   • APPENDECTOMY     • CAROTID STENT     • CHOLECYSTECTOMY     • EYE SURGERY  11/25/2014    Cataract Extraction   • HYSTERECTOMY     • INSERT / REPLACE / REMOVE PACEMAKER     • TOTAL LAPAROSCOPIC HYSTERECTOMY WITH MID-URETHRAL SLING AND CYSTOSCOPY         Review of Systems   Constitutional: Negative for fatigue, fever and unexpected weight change.   HENT: Positive for hearing loss.    Eyes: Positive for visual disturbance.   Respiratory: Negative for apnea, chest tightness and shortness of breath.    Cardiovascular: Negative for chest pain, palpitations and leg swelling.   Gastrointestinal: Negative for abdominal pain and blood in stool.   Genitourinary: Negative for dysuria and hematuria.   Musculoskeletal: Negative  "for myalgias.   Neurological: Negative for weakness and light-headedness.   Hematological: Bruises/bleeds easily.   Psychiatric/Behavioral: Negative for sleep disturbance.       /67   Pulse 61   Ht 152.4 cm (60\")   Wt 74.8 kg (165 lb)   BMI 32.22 kg/m²   Procedures    Objective   Physical Exam   Constitutional: She is oriented to person, place, and time. No distress.   Frail and elderly     HENT:   Head: Normocephalic.   Eyes: Pupils are equal, round, and reactive to light.   Neck: No thyromegaly present.   Cardiovascular: Normal rate, regular rhythm and normal heart sounds. Exam reveals no gallop and no friction rub.   No murmur heard.  Pulmonary/Chest: Effort normal and breath sounds normal. No stridor. No respiratory distress. She has no wheezes. She has no rales.   Abdominal: Soft. Bowel sounds are normal. She exhibits no distension. There is no tenderness. There is no guarding.   Musculoskeletal: She exhibits no edema or deformity.   Neurological: She is alert and oriented to person, place, and time.   Skin: Skin is warm and dry. She is not diaphoretic.   Psychiatric: She has a normal mood and affect.       Assessment/Plan   Juarez was seen today for atrial fibrillation, congestive heart failure and hypertension.    Diagnoses and all orders for this visit:    Coronary artery disease involving native coronary artery of native heart without angina pectoris  Comments:  no angina  Orders:  -     ECG 12 Lead    Essential hypertension  Comments:  good control    Mixed hyperlipidemia  Comments:  good control    Pacemaker  Comments:  good function    Paroxysmal atrial fibrillation (CMS/HCC)  Comments:  none seen on recent pacer interrogation - tolerates DOAC ok    SSS (sick sinus syndrome) (CMS/HCC)  Comments:  asymptomatic with meds and pacer                 Return in about 1 year (around 7/17/2020) for Next scheduled follow up.  Orders Placed This Encounter   Procedures   • ECG 12 Lead     Order Specific " Question:   Reason for Exam:     Answer:   [email or web address]

## 2019-08-01 DIAGNOSIS — E03.9 ACQUIRED HYPOTHYROIDISM: ICD-10-CM

## 2019-08-01 DIAGNOSIS — E11.42 TYPE 2 DIABETES MELLITUS WITH DIABETIC POLYNEUROPATHY, WITHOUT LONG-TERM CURRENT USE OF INSULIN (HCC): ICD-10-CM

## 2019-08-01 DIAGNOSIS — G47.33 OSA ON CPAP: ICD-10-CM

## 2019-08-01 DIAGNOSIS — I50.32 CHRONIC DIASTOLIC CONGESTIVE HEART FAILURE (HCC): ICD-10-CM

## 2019-08-01 DIAGNOSIS — Z99.89 CPAP (CONTINUOUS POSITIVE AIRWAY PRESSURE) DEPENDENCE: ICD-10-CM

## 2019-08-01 DIAGNOSIS — N18.30 STAGE 3 CHRONIC KIDNEY DISEASE (HCC): ICD-10-CM

## 2019-08-01 DIAGNOSIS — E78.00 PURE HYPERCHOLESTEROLEMIA: ICD-10-CM

## 2019-08-01 DIAGNOSIS — E55.9 VITAMIN D DEFICIENCY: ICD-10-CM

## 2019-08-01 LAB
ALBUMIN SERPL-MCNC: 4.2 G/DL (ref 3.5–5.2)
ALP BLD-CCNC: 50 U/L (ref 35–104)
ALT SERPL-CCNC: 14 U/L (ref 5–33)
ANION GAP SERPL CALCULATED.3IONS-SCNC: 11 MMOL/L (ref 7–19)
AST SERPL-CCNC: 13 U/L (ref 5–32)
BASOPHILS ABSOLUTE: 0 K/UL (ref 0–0.2)
BASOPHILS RELATIVE PERCENT: 0.7 % (ref 0–1)
BILIRUB SERPL-MCNC: 0.4 MG/DL (ref 0.2–1.2)
BILIRUBIN URINE: NEGATIVE
BLOOD, URINE: NEGATIVE
BUN BLDV-MCNC: 28 MG/DL (ref 8–23)
CALCIUM SERPL-MCNC: 9.1 MG/DL (ref 8.2–9.6)
CHLORIDE BLD-SCNC: 104 MMOL/L (ref 98–111)
CHOLESTEROL, TOTAL: 168 MG/DL (ref 160–199)
CLARITY: CLEAR
CO2: 26 MMOL/L (ref 22–29)
COLOR: YELLOW
CREAT SERPL-MCNC: 1.5 MG/DL (ref 0.5–0.9)
CREATININE URINE: 125.3 MG/DL (ref 4.2–622)
EOSINOPHILS ABSOLUTE: 0.2 K/UL (ref 0–0.6)
EOSINOPHILS RELATIVE PERCENT: 2.7 % (ref 0–5)
GFR NON-AFRICAN AMERICAN: 32
GLUCOSE BLD-MCNC: 111 MG/DL (ref 74–109)
GLUCOSE URINE: NEGATIVE MG/DL
HBA1C MFR BLD: 6.4 % (ref 4–6)
HCT VFR BLD CALC: 38.9 % (ref 37–47)
HDLC SERPL-MCNC: 77 MG/DL (ref 65–121)
HEMOGLOBIN: 12.1 G/DL (ref 12–16)
KETONES, URINE: NEGATIVE MG/DL
LDL CHOLESTEROL CALCULATED: 79 MG/DL
LEUKOCYTE ESTERASE, URINE: ABNORMAL
LYMPHOCYTES ABSOLUTE: 1.5 K/UL (ref 1.1–4.5)
LYMPHOCYTES RELATIVE PERCENT: 26.1 % (ref 20–40)
MCH RBC QN AUTO: 29.2 PG (ref 27–31)
MCHC RBC AUTO-ENTMCNC: 31.1 G/DL (ref 33–37)
MCV RBC AUTO: 94 FL (ref 81–99)
MICROALBUMIN UR-MCNC: 1.6 MG/DL (ref 0–19)
MICROALBUMIN/CREAT UR-RTO: 12.8 MG/G
MONOCYTES ABSOLUTE: 0.5 K/UL (ref 0–0.9)
MONOCYTES RELATIVE PERCENT: 9.1 % (ref 0–10)
NEUTROPHILS ABSOLUTE: 3.4 K/UL (ref 1.5–7.5)
NEUTROPHILS RELATIVE PERCENT: 60.3 % (ref 50–65)
NITRITE, URINE: POSITIVE
PDW BLD-RTO: 14.8 % (ref 11.5–14.5)
PH UA: 7 (ref 5–8)
PLATELET # BLD: 201 K/UL (ref 130–400)
PMV BLD AUTO: 9.6 FL (ref 9.4–12.3)
POTASSIUM SERPL-SCNC: 4.4 MMOL/L (ref 3.5–5)
PROTEIN UA: NEGATIVE MG/DL
RBC # BLD: 4.14 M/UL (ref 4.2–5.4)
SODIUM BLD-SCNC: 141 MMOL/L (ref 136–145)
SPECIFIC GRAVITY UA: 1.02 (ref 1–1.03)
TOTAL PROTEIN: 7.1 G/DL (ref 6.6–8.7)
TRIGL SERPL-MCNC: 61 MG/DL (ref 0–149)
TSH SERPL DL<=0.05 MIU/L-ACNC: 5.12 UIU/ML (ref 0.27–4.2)
UROBILINOGEN, URINE: 1 E.U./DL
VITAMIN D 25-HYDROXY: 63.3 NG/ML
WBC # BLD: 5.6 K/UL (ref 4.8–10.8)

## 2019-08-08 ENCOUNTER — OFFICE VISIT (OUTPATIENT)
Dept: INTERNAL MEDICINE | Age: 84
End: 2019-08-08
Payer: MEDICARE

## 2019-08-08 VITALS
BODY MASS INDEX: 32.39 KG/M2 | OXYGEN SATURATION: 96 % | RESPIRATION RATE: 18 BRPM | SYSTOLIC BLOOD PRESSURE: 134 MMHG | HEART RATE: 60 BPM | DIASTOLIC BLOOD PRESSURE: 66 MMHG | WEIGHT: 165 LBS | HEIGHT: 60 IN

## 2019-08-08 DIAGNOSIS — Z00.00 MEDICARE ANNUAL WELLNESS VISIT, SUBSEQUENT: Primary | ICD-10-CM

## 2019-08-08 DIAGNOSIS — E11.42 TYPE 2 DIABETES MELLITUS WITH DIABETIC POLYNEUROPATHY, WITHOUT LONG-TERM CURRENT USE OF INSULIN (HCC): ICD-10-CM

## 2019-08-08 DIAGNOSIS — G47.33 OSA ON CPAP: ICD-10-CM

## 2019-08-08 DIAGNOSIS — I50.32 CHRONIC DIASTOLIC CONGESTIVE HEART FAILURE (HCC): ICD-10-CM

## 2019-08-08 DIAGNOSIS — Z99.89 OSA ON CPAP: ICD-10-CM

## 2019-08-08 DIAGNOSIS — I10 ESSENTIAL HYPERTENSION: ICD-10-CM

## 2019-08-08 DIAGNOSIS — N18.30 STAGE 3 CHRONIC KIDNEY DISEASE (HCC): ICD-10-CM

## 2019-08-08 DIAGNOSIS — E03.9 ACQUIRED HYPOTHYROIDISM: ICD-10-CM

## 2019-08-08 DIAGNOSIS — E78.00 PURE HYPERCHOLESTEROLEMIA: ICD-10-CM

## 2019-08-08 PROCEDURE — 1090F PRES/ABSN URINE INCON ASSESS: CPT | Performed by: INTERNAL MEDICINE

## 2019-08-08 PROCEDURE — 1036F TOBACCO NON-USER: CPT | Performed by: INTERNAL MEDICINE

## 2019-08-08 PROCEDURE — G8428 CUR MEDS NOT DOCUMENT: HCPCS | Performed by: INTERNAL MEDICINE

## 2019-08-08 PROCEDURE — G8598 ASA/ANTIPLAT THER USED: HCPCS | Performed by: INTERNAL MEDICINE

## 2019-08-08 PROCEDURE — 1123F ACP DISCUSS/DSCN MKR DOCD: CPT | Performed by: INTERNAL MEDICINE

## 2019-08-08 PROCEDURE — 99214 OFFICE O/P EST MOD 30 MIN: CPT | Performed by: INTERNAL MEDICINE

## 2019-08-08 PROCEDURE — G0439 PPPS, SUBSEQ VISIT: HCPCS | Performed by: INTERNAL MEDICINE

## 2019-08-08 PROCEDURE — 4040F PNEUMOC VAC/ADMIN/RCVD: CPT | Performed by: INTERNAL MEDICINE

## 2019-08-08 PROCEDURE — G8417 CALC BMI ABV UP PARAM F/U: HCPCS | Performed by: INTERNAL MEDICINE

## 2019-08-08 RX ORDER — OLMESARTAN MEDOXOMIL 40 MG/1
TABLET ORAL
Qty: 90 TABLET | Refills: 1 | Status: SHIPPED | OUTPATIENT
Start: 2019-08-08 | End: 2019-12-09 | Stop reason: SDUPTHER

## 2019-08-08 RX ORDER — AMITRIPTYLINE HYDROCHLORIDE 25 MG/1
25 TABLET, FILM COATED ORAL NIGHTLY
Qty: 90 TABLET | Refills: 1 | Status: SHIPPED | OUTPATIENT
Start: 2019-08-08 | End: 2019-12-09 | Stop reason: CLARIF

## 2019-08-08 RX ORDER — POTASSIUM CHLORIDE 20 MEQ/1
TABLET, EXTENDED RELEASE ORAL
Qty: 180 TABLET | Refills: 3 | Status: SHIPPED | OUTPATIENT
Start: 2019-08-08 | End: 2019-12-09 | Stop reason: SDUPTHER

## 2019-08-08 RX ORDER — ERGOCALCIFEROL 1.25 MG/1
CAPSULE ORAL
Qty: 12 CAPSULE | Refills: 1 | Status: SHIPPED | OUTPATIENT
Start: 2019-08-08 | End: 2019-12-09 | Stop reason: SDUPTHER

## 2019-08-08 RX ORDER — AMLODIPINE BESYLATE 10 MG/1
TABLET ORAL
Qty: 90 TABLET | Refills: 1 | Status: SHIPPED | OUTPATIENT
Start: 2019-08-08 | End: 2019-12-09 | Stop reason: SDUPTHER

## 2019-08-08 RX ORDER — PRAVASTATIN SODIUM 40 MG
TABLET ORAL
Qty: 90 TABLET | Refills: 1 | Status: SHIPPED | OUTPATIENT
Start: 2019-08-08 | End: 2019-12-09 | Stop reason: SDUPTHER

## 2019-08-08 RX ORDER — CARVEDILOL 25 MG/1
TABLET ORAL
Qty: 180 TABLET | Refills: 1 | Status: SHIPPED | OUTPATIENT
Start: 2019-08-08 | End: 2019-12-09 | Stop reason: SDUPTHER

## 2019-08-08 RX ORDER — FUROSEMIDE 40 MG/1
TABLET ORAL
Qty: 135 TABLET | Refills: 1 | Status: SHIPPED | OUTPATIENT
Start: 2019-08-08 | End: 2019-12-09 | Stop reason: SDUPTHER

## 2019-08-08 RX ORDER — RANOLAZINE 500 MG/1
TABLET, EXTENDED RELEASE ORAL
Qty: 180 TABLET | Refills: 1 | Status: SHIPPED | OUTPATIENT
Start: 2019-08-08 | End: 2019-12-09 | Stop reason: SDUPTHER

## 2019-08-08 RX ORDER — LEVOTHYROXINE SODIUM 0.05 MG/1
TABLET ORAL
Qty: 90 TABLET | Refills: 1 | Status: SHIPPED | OUTPATIENT
Start: 2019-08-08 | End: 2019-12-09 | Stop reason: SDUPTHER

## 2019-08-08 ASSESSMENT — ENCOUNTER SYMPTOMS
ABDOMINAL PAIN: 0
DIARRHEA: 0
SHORTNESS OF BREATH: 1
VOICE CHANGE: 0
BLOOD IN STOOL: 0
SINUS PRESSURE: 0
SORE THROAT: 0
COUGH: 0
VOMITING: 0
WHEEZING: 0
TROUBLE SWALLOWING: 0
EYE REDNESS: 0
NAUSEA: 0
EYE PAIN: 0
CONSTIPATION: 0
COLOR CHANGE: 0
CHEST TIGHTNESS: 0

## 2019-08-08 ASSESSMENT — LIFESTYLE VARIABLES: HOW OFTEN DO YOU HAVE A DRINK CONTAINING ALCOHOL: 0

## 2019-08-08 NOTE — PROGRESS NOTES
4. 2     Total Bilirubin 08/01/2019 0.4     Alkaline Phosphatase 08/01/2019 50     ALT 08/01/2019 14     AST 08/01/2019 13     WBC 08/01/2019 5.6     RBC 08/01/2019 4.14*    Hemoglobin 08/01/2019 12.1     Hematocrit 08/01/2019 38.9     MCV 08/01/2019 94.0     MCH 08/01/2019 29.2     MCHC 08/01/2019 31.1*    RDW 08/01/2019 14.8*    Platelets 40/44/8158 201     MPV 08/01/2019 9.6     Neutrophils % 08/01/2019 60.3     Lymphocytes % 08/01/2019 26.1     Monocytes % 08/01/2019 9.1     Eosinophils % 08/01/2019 2.7     Basophils % 08/01/2019 0.7     Neutrophils # 08/01/2019 3.4     Lymphocytes # 08/01/2019 1.5     Monocytes # 08/01/2019 0.50     Eosinophils # 08/01/2019 0.20     Basophils # 08/01/2019 0.00          Review of Systems   Constitutional: Positive for fatigue. Negative for chills and fever. HENT: Negative for congestion, ear pain, postnasal drip, sinus pressure, sore throat, trouble swallowing and voice change. Eyes: Negative for pain, redness and visual disturbance. Respiratory: Positive for shortness of breath. Negative for cough, chest tightness and wheezing. Cardiovascular: Positive for leg swelling. Negative for chest pain and palpitations. Gastrointestinal: Negative for abdominal pain, blood in stool, constipation, diarrhea, nausea and vomiting. Endocrine: Negative for polydipsia and polyuria. Genitourinary: Negative for dysuria, enuresis, flank pain, frequency and urgency. Musculoskeletal: Positive for arthralgias. Negative for gait problem and joint swelling. Skin: Negative for color change and rash. Neurological: Negative for dizziness, tremors, syncope, facial asymmetry, speech difficulty, weakness, numbness and headaches. Psychiatric/Behavioral: Negative for agitation, behavioral problems, confusion, sleep disturbance and suicidal ideas. The patient is not nervous/anxious.            Vitals:    08/08/19 0912   BP: 134/66   Site: Left Upper Arm   Position:

## 2019-12-06 DIAGNOSIS — Z00.00 MEDICARE ANNUAL WELLNESS VISIT, SUBSEQUENT: ICD-10-CM

## 2019-12-06 DIAGNOSIS — E11.42 TYPE 2 DIABETES MELLITUS WITH DIABETIC POLYNEUROPATHY, WITHOUT LONG-TERM CURRENT USE OF INSULIN (HCC): ICD-10-CM

## 2019-12-06 DIAGNOSIS — E03.9 ACQUIRED HYPOTHYROIDISM: ICD-10-CM

## 2019-12-06 DIAGNOSIS — G47.33 OSA ON CPAP: ICD-10-CM

## 2019-12-06 DIAGNOSIS — E78.00 PURE HYPERCHOLESTEROLEMIA: ICD-10-CM

## 2019-12-06 DIAGNOSIS — I10 ESSENTIAL HYPERTENSION: ICD-10-CM

## 2019-12-06 DIAGNOSIS — N18.30 STAGE 3 CHRONIC KIDNEY DISEASE (HCC): ICD-10-CM

## 2019-12-06 DIAGNOSIS — I50.32 CHRONIC DIASTOLIC CONGESTIVE HEART FAILURE (HCC): ICD-10-CM

## 2019-12-06 LAB
ALBUMIN SERPL-MCNC: 3.8 G/DL (ref 3.5–5.2)
ALP BLD-CCNC: 59 U/L (ref 35–104)
ALT SERPL-CCNC: 15 U/L (ref 5–33)
ANION GAP SERPL CALCULATED.3IONS-SCNC: 18 MMOL/L (ref 7–19)
AST SERPL-CCNC: 14 U/L (ref 5–32)
BILIRUB SERPL-MCNC: 0.6 MG/DL (ref 0.2–1.2)
BUN BLDV-MCNC: 22 MG/DL (ref 8–23)
CALCIUM SERPL-MCNC: 8.9 MG/DL (ref 8.2–9.6)
CHLORIDE BLD-SCNC: 103 MMOL/L (ref 98–111)
CHOLESTEROL, TOTAL: 152 MG/DL (ref 160–199)
CO2: 22 MMOL/L (ref 22–29)
CREAT SERPL-MCNC: 1.4 MG/DL (ref 0.5–0.9)
GFR NON-AFRICAN AMERICAN: 35
GLUCOSE BLD-MCNC: 119 MG/DL (ref 74–109)
HBA1C MFR BLD: 5.9 % (ref 4–6)
HDLC SERPL-MCNC: 66 MG/DL (ref 65–121)
LDL CHOLESTEROL CALCULATED: 73 MG/DL
PARATHYROID HORMONE INTACT: 76.1 PG/ML (ref 15–65)
PHOSPHORUS: 3.9 MG/DL (ref 2.5–4.5)
POTASSIUM SERPL-SCNC: 3.8 MMOL/L (ref 3.5–5)
SODIUM BLD-SCNC: 143 MMOL/L (ref 136–145)
T4 FREE: 1.32 NG/DL (ref 0.93–1.7)
TOTAL PROTEIN: 6.7 G/DL (ref 6.6–8.7)
TRIGL SERPL-MCNC: 67 MG/DL (ref 0–149)
TSH SERPL DL<=0.05 MIU/L-ACNC: 5.04 UIU/ML (ref 0.27–4.2)
VITAMIN D 25-HYDROXY: 61.9 NG/ML

## 2019-12-09 ENCOUNTER — OFFICE VISIT (OUTPATIENT)
Dept: INTERNAL MEDICINE | Age: 84
End: 2019-12-09
Payer: MEDICARE

## 2019-12-09 VITALS
DIASTOLIC BLOOD PRESSURE: 66 MMHG | WEIGHT: 163 LBS | BODY MASS INDEX: 30 KG/M2 | HEIGHT: 62 IN | OXYGEN SATURATION: 99 % | HEART RATE: 63 BPM | SYSTOLIC BLOOD PRESSURE: 120 MMHG | RESPIRATION RATE: 18 BRPM

## 2019-12-09 DIAGNOSIS — E78.00 PURE HYPERCHOLESTEROLEMIA: ICD-10-CM

## 2019-12-09 DIAGNOSIS — E55.9 VITAMIN D DEFICIENCY: ICD-10-CM

## 2019-12-09 DIAGNOSIS — I50.32 CHRONIC DIASTOLIC CONGESTIVE HEART FAILURE (HCC): ICD-10-CM

## 2019-12-09 DIAGNOSIS — E03.9 ACQUIRED HYPOTHYROIDISM: ICD-10-CM

## 2019-12-09 DIAGNOSIS — E11.42 TYPE 2 DIABETES MELLITUS WITH DIABETIC POLYNEUROPATHY, WITHOUT LONG-TERM CURRENT USE OF INSULIN (HCC): Primary | ICD-10-CM

## 2019-12-09 DIAGNOSIS — I10 ESSENTIAL HYPERTENSION: ICD-10-CM

## 2019-12-09 DIAGNOSIS — G47.33 OSA ON CPAP: ICD-10-CM

## 2019-12-09 DIAGNOSIS — N18.30 STAGE 3 CHRONIC KIDNEY DISEASE (HCC): ICD-10-CM

## 2019-12-09 DIAGNOSIS — Z99.89 OSA ON CPAP: ICD-10-CM

## 2019-12-09 PROBLEM — L97.921 CHRONIC ULCER OF LEFT LEG, LIMITED TO BREAKDOWN OF SKIN (HCC): Chronic | Status: RESOLVED | Noted: 2017-07-25 | Resolved: 2019-12-09

## 2019-12-09 PROCEDURE — 99214 OFFICE O/P EST MOD 30 MIN: CPT | Performed by: INTERNAL MEDICINE

## 2019-12-09 PROCEDURE — G8417 CALC BMI ABV UP PARAM F/U: HCPCS | Performed by: INTERNAL MEDICINE

## 2019-12-09 PROCEDURE — 1090F PRES/ABSN URINE INCON ASSESS: CPT | Performed by: INTERNAL MEDICINE

## 2019-12-09 PROCEDURE — 1123F ACP DISCUSS/DSCN MKR DOCD: CPT | Performed by: INTERNAL MEDICINE

## 2019-12-09 PROCEDURE — G8598 ASA/ANTIPLAT THER USED: HCPCS | Performed by: INTERNAL MEDICINE

## 2019-12-09 PROCEDURE — 1036F TOBACCO NON-USER: CPT | Performed by: INTERNAL MEDICINE

## 2019-12-09 PROCEDURE — G8427 DOCREV CUR MEDS BY ELIG CLIN: HCPCS | Performed by: INTERNAL MEDICINE

## 2019-12-09 PROCEDURE — G8482 FLU IMMUNIZE ORDER/ADMIN: HCPCS | Performed by: INTERNAL MEDICINE

## 2019-12-09 PROCEDURE — 4040F PNEUMOC VAC/ADMIN/RCVD: CPT | Performed by: INTERNAL MEDICINE

## 2019-12-09 RX ORDER — LEVOTHYROXINE SODIUM 0.07 MG/1
75 TABLET ORAL DAILY
Qty: 30 TABLET | Refills: 5 | Status: SHIPPED | OUTPATIENT
Start: 2019-12-09 | End: 2020-01-01 | Stop reason: SDUPTHER

## 2019-12-09 RX ORDER — LEVOTHYROXINE SODIUM 0.05 MG/1
TABLET ORAL
Qty: 90 TABLET | Refills: 1 | Status: SHIPPED | OUTPATIENT
Start: 2019-12-09 | End: 2019-12-09

## 2019-12-09 RX ORDER — POTASSIUM CHLORIDE 20 MEQ/1
TABLET, EXTENDED RELEASE ORAL
Qty: 90 TABLET | Refills: 3 | Status: SHIPPED | OUTPATIENT
Start: 2019-12-09 | End: 2020-01-01 | Stop reason: SDUPTHER

## 2019-12-09 RX ORDER — AMLODIPINE BESYLATE 10 MG/1
TABLET ORAL
Qty: 90 TABLET | Refills: 1 | Status: SHIPPED | OUTPATIENT
Start: 2019-12-09 | End: 2020-01-01 | Stop reason: SDUPTHER

## 2019-12-09 RX ORDER — OLMESARTAN MEDOXOMIL 40 MG/1
TABLET ORAL
Qty: 90 TABLET | Refills: 1 | Status: SHIPPED | OUTPATIENT
Start: 2019-12-09 | End: 2020-01-01 | Stop reason: SDUPTHER

## 2019-12-09 RX ORDER — ERGOCALCIFEROL 1.25 MG/1
CAPSULE ORAL
Qty: 12 CAPSULE | Refills: 1 | Status: SHIPPED | OUTPATIENT
Start: 2019-12-09 | End: 2020-01-01 | Stop reason: SDUPTHER

## 2019-12-09 RX ORDER — CARVEDILOL 25 MG/1
TABLET ORAL
Qty: 180 TABLET | Refills: 1 | Status: SHIPPED | OUTPATIENT
Start: 2019-12-09 | End: 2020-01-01 | Stop reason: SDUPTHER

## 2019-12-09 RX ORDER — FUROSEMIDE 40 MG/1
TABLET ORAL
Qty: 135 TABLET | Refills: 1 | Status: SHIPPED | OUTPATIENT
Start: 2019-12-09 | End: 2020-01-01 | Stop reason: SDUPTHER

## 2019-12-09 RX ORDER — PRAVASTATIN SODIUM 40 MG
TABLET ORAL
Qty: 90 TABLET | Refills: 1 | Status: SHIPPED | OUTPATIENT
Start: 2019-12-09 | End: 2020-01-01 | Stop reason: SDUPTHER

## 2019-12-09 RX ORDER — RANOLAZINE 500 MG/1
TABLET, EXTENDED RELEASE ORAL
Qty: 180 TABLET | Refills: 1 | Status: SHIPPED | OUTPATIENT
Start: 2019-12-09 | End: 2020-01-01 | Stop reason: SDUPTHER

## 2019-12-09 ASSESSMENT — ENCOUNTER SYMPTOMS
WHEEZING: 0
ABDOMINAL PAIN: 0
CHEST TIGHTNESS: 0
SORE THROAT: 0
COUGH: 0
CONSTIPATION: 0

## 2020-01-01 ENCOUNTER — OFFICE VISIT (OUTPATIENT)
Dept: INTERNAL MEDICINE | Age: 85
End: 2020-01-01
Payer: MEDICARE

## 2020-01-01 ENCOUNTER — APPOINTMENT (OUTPATIENT)
Dept: CT IMAGING | Age: 85
DRG: 392 | End: 2020-01-01
Attending: FAMILY MEDICINE
Payer: MEDICARE

## 2020-01-01 ENCOUNTER — HOSPITAL ENCOUNTER (INPATIENT)
Age: 85
LOS: 2 days | Discharge: HOME HEALTH | DRG: 392 | End: 2021-01-02
Attending: FAMILY MEDICINE | Admitting: INTERNAL MEDICINE
Payer: MEDICARE

## 2020-01-01 ENCOUNTER — APPOINTMENT (OUTPATIENT)
Dept: CT IMAGING | Age: 85
End: 2020-01-01
Payer: MEDICARE

## 2020-01-01 ENCOUNTER — TELEPHONE (OUTPATIENT)
Dept: INTERNAL MEDICINE | Age: 85
End: 2020-01-01

## 2020-01-01 ENCOUNTER — APPOINTMENT (OUTPATIENT)
Dept: ULTRASOUND IMAGING | Age: 85
DRG: 392 | End: 2020-01-01
Attending: FAMILY MEDICINE
Payer: MEDICARE

## 2020-01-01 ENCOUNTER — HOSPITAL ENCOUNTER (EMERGENCY)
Age: 85
Discharge: HOME | End: 2020-12-23
Attending: EMERGENCY MEDICINE
Payer: MEDICARE

## 2020-01-01 ENCOUNTER — TELEMEDICINE (OUTPATIENT)
Dept: INTERNAL MEDICINE | Age: 85
End: 2020-01-01
Payer: MEDICARE

## 2020-01-01 ENCOUNTER — OFFICE VISIT (OUTPATIENT)
Dept: CARDIOLOGY | Facility: CLINIC | Age: 85
End: 2020-01-01

## 2020-01-01 ENCOUNTER — APPOINTMENT (OUTPATIENT)
Dept: GENERAL RADIOLOGY | Age: 85
DRG: 392 | End: 2020-01-01
Attending: FAMILY MEDICINE
Payer: MEDICARE

## 2020-01-01 VITALS
BODY MASS INDEX: 26.34 KG/M2 | DIASTOLIC BLOOD PRESSURE: 50 MMHG | WEIGHT: 144 LBS | HEART RATE: 76 BPM | SYSTOLIC BLOOD PRESSURE: 128 MMHG | OXYGEN SATURATION: 98 % | RESPIRATION RATE: 18 BRPM

## 2020-01-01 VITALS — WEIGHT: 155 LBS | DIASTOLIC BLOOD PRESSURE: 66 MMHG | BODY MASS INDEX: 28.35 KG/M2 | SYSTOLIC BLOOD PRESSURE: 120 MMHG

## 2020-01-01 VITALS
TEMPERATURE: 97.9 F | HEIGHT: 62 IN | BODY MASS INDEX: 27.05 KG/M2 | WEIGHT: 147 LBS | HEART RATE: 66 BPM | OXYGEN SATURATION: 97 % | SYSTOLIC BLOOD PRESSURE: 132 MMHG | DIASTOLIC BLOOD PRESSURE: 62 MMHG | RESPIRATION RATE: 16 BRPM

## 2020-01-01 VITALS
BODY MASS INDEX: 30.36 KG/M2 | WEIGHT: 165 LBS | DIASTOLIC BLOOD PRESSURE: 60 MMHG | HEART RATE: 56 BPM | SYSTOLIC BLOOD PRESSURE: 153 MMHG | HEIGHT: 62 IN

## 2020-01-01 VITALS
HEART RATE: 62 BPM | WEIGHT: 150 LBS | SYSTOLIC BLOOD PRESSURE: 128 MMHG | RESPIRATION RATE: 18 BRPM | HEIGHT: 62 IN | BODY MASS INDEX: 27.6 KG/M2 | DIASTOLIC BLOOD PRESSURE: 60 MMHG | OXYGEN SATURATION: 97 %

## 2020-01-01 VITALS
WEIGHT: 165 LBS | OXYGEN SATURATION: 99 % | SYSTOLIC BLOOD PRESSURE: 120 MMHG | BODY MASS INDEX: 30.36 KG/M2 | HEART RATE: 61 BPM | RESPIRATION RATE: 18 BRPM | DIASTOLIC BLOOD PRESSURE: 62 MMHG | HEIGHT: 62 IN

## 2020-01-01 DIAGNOSIS — E87.6 HYPOKALEMIA: ICD-10-CM

## 2020-01-01 DIAGNOSIS — R53.1 GENERAL WEAKNESS: ICD-10-CM

## 2020-01-01 DIAGNOSIS — D37.9 NEOPLASM OF UNCERTAIN BEHAVIOR OF DIGESTIVE ORGAN, UNSPECIFIED: ICD-10-CM

## 2020-01-01 DIAGNOSIS — E86.0 DEHYDRATION: ICD-10-CM

## 2020-01-01 DIAGNOSIS — I25.10 CORONARY ARTERY DISEASE INVOLVING NATIVE CORONARY ARTERY OF NATIVE HEART WITHOUT ANGINA PECTORIS: ICD-10-CM

## 2020-01-01 DIAGNOSIS — E11.42 TYPE 2 DIABETES MELLITUS WITH DIABETIC POLYNEUROPATHY, WITHOUT LONG-TERM CURRENT USE OF INSULIN (HCC): ICD-10-CM

## 2020-01-01 DIAGNOSIS — I50.32 CHRONIC DIASTOLIC CONGESTIVE HEART FAILURE (HCC): ICD-10-CM

## 2020-01-01 DIAGNOSIS — I10 ESSENTIAL HYPERTENSION: ICD-10-CM

## 2020-01-01 DIAGNOSIS — E78.00 PURE HYPERCHOLESTEROLEMIA: ICD-10-CM

## 2020-01-01 DIAGNOSIS — E03.9 ACQUIRED HYPOTHYROIDISM: ICD-10-CM

## 2020-01-01 DIAGNOSIS — R19.7 DIARRHEA, UNSPECIFIED TYPE: ICD-10-CM

## 2020-01-01 DIAGNOSIS — E55.9 VITAMIN D DEFICIENCY: ICD-10-CM

## 2020-01-01 DIAGNOSIS — G47.33 OSA ON CPAP: ICD-10-CM

## 2020-01-01 DIAGNOSIS — Z95.0 PACEMAKER: ICD-10-CM

## 2020-01-01 DIAGNOSIS — N18.31 STAGE 3A CHRONIC KIDNEY DISEASE (HCC): ICD-10-CM

## 2020-01-01 DIAGNOSIS — D64.89 ANEMIA DUE TO MULTIPLE MECHANISMS: ICD-10-CM

## 2020-01-01 DIAGNOSIS — R63.4 WEIGHT LOSS: ICD-10-CM

## 2020-01-01 DIAGNOSIS — K86.2 CYSTIC MASS OF PANCREAS: ICD-10-CM

## 2020-01-01 DIAGNOSIS — N18.30 STAGE 3 CHRONIC KIDNEY DISEASE (HCC): ICD-10-CM

## 2020-01-01 DIAGNOSIS — I48.21 PERMANENT ATRIAL FIBRILLATION (HCC): ICD-10-CM

## 2020-01-01 DIAGNOSIS — R54 ADVANCED AGE: ICD-10-CM

## 2020-01-01 DIAGNOSIS — I48.0 PAROXYSMAL ATRIAL FIBRILLATION (HCC): Primary | ICD-10-CM

## 2020-01-01 DIAGNOSIS — I49.5 SSS (SICK SINUS SYNDROME) (HCC): ICD-10-CM

## 2020-01-01 DIAGNOSIS — Z00.00 MEDICARE ANNUAL WELLNESS VISIT, SUBSEQUENT: ICD-10-CM

## 2020-01-01 LAB
ADENOVIRUS F 40 41 PCR: NOT DETECTED
ALBUMIN SERPL-MCNC: 3.2 G/DL (ref 3.5–5.2)
ALBUMIN SERPL-MCNC: 3.3 G/DL (ref 3.5–5.2)
ALBUMIN SERPL-MCNC: 3.6 G/DL (ref 3.5–5.2)
ALBUMIN SERPL-MCNC: 3.8 G/DL (ref 3.5–5.2)
ALBUMIN SERPL-MCNC: 3.9 G/DL (ref 3.5–5.2)
ALBUMIN SERPL-MCNC: 4 G/DL (ref 3.5–5.2)
ALBUMIN SERPL-MCNC: 4 G/DL (ref 3.5–5.2)
ALP BLD-CCNC: 100 U/L (ref 35–104)
ALP BLD-CCNC: 101 U/L (ref 35–104)
ALP BLD-CCNC: 65 U/L (ref 35–104)
ALP BLD-CCNC: 71 U/L (ref 35–104)
ALP BLD-CCNC: 86 U/L (ref 35–104)
ALP BLD-CCNC: 97 U/L (ref 35–104)
ALP BLD-CCNC: 98 U/L (ref 35–104)
ALT SERPL-CCNC: 11 U/L (ref 5–33)
ALT SERPL-CCNC: 13 U/L (ref 5–33)
ALT SERPL-CCNC: 15 U/L (ref 5–33)
ALT SERPL-CCNC: 15 U/L (ref 5–33)
ALT SERPL-CCNC: 17 U/L (ref 5–33)
ALT SERPL-CCNC: 20 U/L (ref 5–33)
ALT SERPL-CCNC: 49 U/L (ref 5–33)
AMPHETAMINE SCREEN, URINE: NEGATIVE
ANION GAP SERPL CALCULATED.3IONS-SCNC: 11 MMOL/L (ref 7–19)
ANION GAP SERPL CALCULATED.3IONS-SCNC: 11 MMOL/L (ref 7–19)
ANION GAP SERPL CALCULATED.3IONS-SCNC: 12 MMOL/L (ref 7–19)
ANION GAP SERPL CALCULATED.3IONS-SCNC: 13 MMOL/L (ref 7–19)
ANION GAP SERPL CALCULATED.3IONS-SCNC: 14 MMOL/L (ref 7–19)
ANION GAP SERPL CALCULATED.3IONS-SCNC: 14 MMOL/L (ref 7–19)
ANION GAP SERPL CALCULATED.3IONS-SCNC: 15 MMOL/L (ref 7–19)
ANION GAP SERPL CALCULATED.3IONS-SCNC: 18 MMOL/L (ref 7–19)
AST SERPL-CCNC: 12 U/L (ref 5–32)
AST SERPL-CCNC: 13 U/L (ref 5–32)
AST SERPL-CCNC: 14 U/L (ref 5–32)
AST SERPL-CCNC: 14 U/L (ref 5–32)
AST SERPL-CCNC: 15 U/L (ref 5–32)
AST SERPL-CCNC: 19 U/L (ref 5–32)
AST SERPL-CCNC: 40 U/L (ref 5–32)
ASTROVIRUS PCR: NOT DETECTED
BACTERIA: ABNORMAL /HPF
BARBITURATE SCREEN URINE: NEGATIVE
BASOPHILS ABSOLUTE: 0 K/UL (ref 0–0.2)
BASOPHILS RELATIVE PERCENT: 0.3 % (ref 0–1)
BASOPHILS RELATIVE PERCENT: 0.3 % (ref 0–1)
BASOPHILS RELATIVE PERCENT: 0.4 % (ref 0–1)
BASOPHILS RELATIVE PERCENT: 0.5 % (ref 0–1)
BASOPHILS RELATIVE PERCENT: 0.5 % (ref 0–1)
BASOPHILS RELATIVE PERCENT: 0.6 % (ref 0–1)
BENZODIAZEPINE SCREEN, URINE: NEGATIVE
BILIRUB SERPL-MCNC: 0.4 MG/DL (ref 0.2–1.2)
BILIRUB SERPL-MCNC: 0.4 MG/DL (ref 0.2–1.2)
BILIRUB SERPL-MCNC: 0.5 MG/DL (ref 0.2–1.2)
BILIRUBIN URINE: NEGATIVE
BLOOD, URINE: NEGATIVE
BUN BLDV-MCNC: 11 MG/DL (ref 8–23)
BUN BLDV-MCNC: 16 MG/DL (ref 8–23)
BUN BLDV-MCNC: 18 MG/DL (ref 8–23)
BUN BLDV-MCNC: 20 MG/DL (ref 8–23)
BUN BLDV-MCNC: 21 MG/DL (ref 8–23)
BUN BLDV-MCNC: 21 MG/DL (ref 8–23)
BUN BLDV-MCNC: 26 MG/DL (ref 8–23)
BUN BLDV-MCNC: 26 MG/DL (ref 8–23)
C DIFF TOXIN/ANTIGEN: NORMAL
C DIFF TOXIN/ANTIGEN: NORMAL
CA 19-9: 24 U/ML (ref 0–35)
CALCIUM SERPL-MCNC: 7.5 MG/DL (ref 8.2–9.6)
CALCIUM SERPL-MCNC: 8 MG/DL (ref 8.2–9.6)
CALCIUM SERPL-MCNC: 8.1 MG/DL (ref 8.2–9.6)
CALCIUM SERPL-MCNC: 8.3 MG/DL (ref 8.2–9.6)
CALCIUM SERPL-MCNC: 8.6 MG/DL (ref 8.2–9.6)
CALCIUM SERPL-MCNC: 8.9 MG/DL (ref 8.2–9.6)
CALCIUM SERPL-MCNC: 9.1 MG/DL (ref 8.2–9.6)
CALCIUM SERPL-MCNC: 9.1 MG/DL (ref 8.2–9.6)
CAMPYLOBACTER ANTIGEN: NORMAL
CAMPYLOBACTER PCR: NOT DETECTED
CANNABINOID SCREEN URINE: NEGATIVE
CHLORIDE BLD-SCNC: 103 MMOL/L (ref 98–111)
CHLORIDE BLD-SCNC: 104 MMOL/L (ref 98–111)
CHLORIDE BLD-SCNC: 104 MMOL/L (ref 98–111)
CHLORIDE BLD-SCNC: 105 MMOL/L (ref 98–111)
CHLORIDE BLD-SCNC: 105 MMOL/L (ref 98–111)
CHLORIDE BLD-SCNC: 106 MMOL/L (ref 98–111)
CHLORIDE BLD-SCNC: 107 MMOL/L (ref 98–111)
CHLORIDE BLD-SCNC: 110 MMOL/L (ref 98–111)
CHOLESTEROL, TOTAL: 106 MG/DL (ref 160–199)
CHOLESTEROL, TOTAL: 144 MG/DL (ref 160–199)
CLARITY: ABNORMAL
CLOSTRIDIUM DIFFICILE, PCR: NOT DETECTED
CO2: 16 MMOL/L (ref 22–29)
CO2: 17 MMOL/L (ref 22–29)
CO2: 21 MMOL/L (ref 22–29)
CO2: 23 MMOL/L (ref 22–29)
CO2: 23 MMOL/L (ref 22–29)
CO2: 24 MMOL/L (ref 22–29)
CO2: 24 MMOL/L (ref 22–29)
CO2: 25 MMOL/L (ref 22–29)
COCAINE METABOLITE SCREEN URINE: NEGATIVE
COLOR: YELLOW
CREAT SERPL-MCNC: 1.1 MG/DL (ref 0.5–0.9)
CREAT SERPL-MCNC: 1.3 MG/DL (ref 0.5–0.9)
CREAT SERPL-MCNC: 1.3 MG/DL (ref 0.5–0.9)
CREAT SERPL-MCNC: 1.4 MG/DL (ref 0.5–0.9)
CREAT SERPL-MCNC: 1.5 MG/DL (ref 0.5–0.9)
CREAT SERPL-MCNC: 1.7 MG/DL (ref 0.5–0.9)
CREATININE URINE: 33.9 MG/DL (ref 4.2–622)
CRYPTOSPORIDIUM PCR: NOT DETECTED
CRYSTALS, UA: ABNORMAL /HPF
CULTURE, STOOL: NORMAL
CYCLOSPORA CAYETANENSIS PCR: NOT DETECTED
E COLI ENTEROAGGREGATIVE PCR: NOT DETECTED
E COLI ENTEROPATHOGENIC PCR: NOT DETECTED
E COLI ENTEROTOXIGENIC PCR: NOT DETECTED
E COLI SHIGA TOXIN ASSAY: NORMAL
E COLI SHIGELLA/ENTEROINVASIVE PCR: NOT DETECTED
ENTAMOEBA HISTOLYTICA PCR: NOT DETECTED
EOSINOPHILS ABSOLUTE: 0.1 K/UL (ref 0–0.6)
EOSINOPHILS ABSOLUTE: 0.2 K/UL (ref 0–0.6)
EOSINOPHILS ABSOLUTE: 0.3 K/UL (ref 0–0.6)
EOSINOPHILS ABSOLUTE: 0.4 K/UL (ref 0–0.6)
EOSINOPHILS RELATIVE PERCENT: 1.6 % (ref 0–5)
EOSINOPHILS RELATIVE PERCENT: 3.2 % (ref 0–5)
EOSINOPHILS RELATIVE PERCENT: 4 % (ref 0–5)
EOSINOPHILS RELATIVE PERCENT: 5.2 % (ref 0–5)
EOSINOPHILS RELATIVE PERCENT: 5.4 % (ref 0–5)
EOSINOPHILS RELATIVE PERCENT: 6.1 % (ref 0–5)
EPITHELIAL CELLS, UA: 2 /HPF (ref 0–5)
FERRITIN: 54.8 NG/ML (ref 13–150)
GFR AFRICAN AMERICAN: 34
GFR AFRICAN AMERICAN: 39
GFR AFRICAN AMERICAN: 42
GFR AFRICAN AMERICAN: 46
GFR AFRICAN AMERICAN: 56
GFR NON-AFRICAN AMERICAN: 28
GFR NON-AFRICAN AMERICAN: 32
GFR NON-AFRICAN AMERICAN: 35
GFR NON-AFRICAN AMERICAN: 38
GFR NON-AFRICAN AMERICAN: 38
GFR NON-AFRICAN AMERICAN: 46
GIARDIA LAMBLIA PCR: NOT DETECTED
GLUCOSE BLD-MCNC: 101 MG/DL (ref 74–109)
GLUCOSE BLD-MCNC: 114 MG/DL (ref 70–99)
GLUCOSE BLD-MCNC: 117 MG/DL (ref 74–109)
GLUCOSE BLD-MCNC: 118 MG/DL (ref 74–109)
GLUCOSE BLD-MCNC: 121 MG/DL (ref 70–99)
GLUCOSE BLD-MCNC: 124 MG/DL (ref 70–99)
GLUCOSE BLD-MCNC: 124 MG/DL (ref 70–99)
GLUCOSE BLD-MCNC: 124 MG/DL (ref 74–109)
GLUCOSE BLD-MCNC: 125 MG/DL (ref 74–109)
GLUCOSE BLD-MCNC: 129 MG/DL (ref 70–99)
GLUCOSE BLD-MCNC: 131 MG/DL (ref 74–109)
GLUCOSE BLD-MCNC: 137 MG/DL (ref 74–109)
GLUCOSE BLD-MCNC: 146 MG/DL (ref 70–99)
GLUCOSE BLD-MCNC: 156 MG/DL (ref 74–109)
GLUCOSE BLD-MCNC: 163 MG/DL (ref 70–99)
GLUCOSE BLD-MCNC: 167 MG/DL (ref 70–99)
GLUCOSE BLD-MCNC: 169 MG/DL (ref 70–99)
GLUCOSE URINE: NEGATIVE MG/DL
HBA1C MFR BLD: 5.5 % (ref 4–6)
HBA1C MFR BLD: 6.2 % (ref 4–6)
HBA1C MFR BLD: 6.2 % (ref 4–6)
HCT VFR BLD CALC: 30.4 % (ref 37–47)
HCT VFR BLD CALC: 35.7 % (ref 37–47)
HCT VFR BLD CALC: 35.7 % (ref 37–47)
HCT VFR BLD CALC: 36.7 % (ref 37–47)
HCT VFR BLD CALC: 39.1 % (ref 37–47)
HCT VFR BLD CALC: 40.3 % (ref 37–47)
HDLC SERPL-MCNC: 32 MG/DL (ref 65–121)
HDLC SERPL-MCNC: 70 MG/DL (ref 65–121)
HEMOGLOBIN: 10.4 G/DL (ref 12–16)
HEMOGLOBIN: 11.2 G/DL (ref 12–16)
HEMOGLOBIN: 12 G/DL (ref 12–16)
HEMOGLOBIN: 12.1 G/DL (ref 12–16)
HEMOGLOBIN: 12.4 G/DL (ref 12–16)
HEMOGLOBIN: 12.7 G/DL (ref 12–16)
HYALINE CASTS: 3 /HPF (ref 0–8)
IMMATURE GRANULOCYTES #: 0 K/UL
IMMATURE GRANULOCYTES #: 0.1 K/UL
KETONES, URINE: NEGATIVE MG/DL
LDL CHOLESTEROL CALCULATED: 54 MG/DL
LDL CHOLESTEROL CALCULATED: 61 MG/DL
LEUKOCYTE ESTERASE, URINE: ABNORMAL
LIPASE: 48 U/L (ref 13–60)
LYMPHOCYTES ABSOLUTE: 0.9 K/UL (ref 1.1–4.5)
LYMPHOCYTES ABSOLUTE: 1 K/UL (ref 1.1–4.5)
LYMPHOCYTES ABSOLUTE: 1 K/UL (ref 1.1–4.5)
LYMPHOCYTES ABSOLUTE: 1.1 K/UL (ref 1.1–4.5)
LYMPHOCYTES ABSOLUTE: 1.2 K/UL (ref 1.1–4.5)
LYMPHOCYTES ABSOLUTE: 1.3 K/UL (ref 1.1–4.5)
LYMPHOCYTES RELATIVE PERCENT: 14.3 % (ref 20–40)
LYMPHOCYTES RELATIVE PERCENT: 15.3 % (ref 20–40)
LYMPHOCYTES RELATIVE PERCENT: 15.6 % (ref 20–40)
LYMPHOCYTES RELATIVE PERCENT: 15.7 % (ref 20–40)
LYMPHOCYTES RELATIVE PERCENT: 16.9 % (ref 20–40)
LYMPHOCYTES RELATIVE PERCENT: 17.4 % (ref 20–40)
Lab: NORMAL
MAGNESIUM: 1.3 MG/DL (ref 1.7–2.3)
MAGNESIUM: 2.7 MG/DL (ref 1.7–2.3)
MAGNESIUM: 3.5 MG/DL (ref 1.7–2.3)
MCH RBC QN AUTO: 27.7 PG (ref 27–31)
MCH RBC QN AUTO: 28.7 PG (ref 27–31)
MCH RBC QN AUTO: 29 PG (ref 27–31)
MCH RBC QN AUTO: 29.1 PG (ref 27–31)
MCH RBC QN AUTO: 29.1 PG (ref 27–31)
MCH RBC QN AUTO: 29.5 PG (ref 27–31)
MCHC RBC AUTO-ENTMCNC: 30.8 G/DL (ref 33–37)
MCHC RBC AUTO-ENTMCNC: 31.4 G/DL (ref 33–37)
MCHC RBC AUTO-ENTMCNC: 32.5 G/DL (ref 33–37)
MCHC RBC AUTO-ENTMCNC: 32.7 G/DL (ref 33–37)
MCHC RBC AUTO-ENTMCNC: 33.9 G/DL (ref 33–37)
MCHC RBC AUTO-ENTMCNC: 34.2 G/DL (ref 33–37)
MCV RBC AUTO: 85.8 FL (ref 81–99)
MCV RBC AUTO: 86.4 FL (ref 81–99)
MCV RBC AUTO: 87.8 FL (ref 81–99)
MCV RBC AUTO: 88.4 FL (ref 81–99)
MCV RBC AUTO: 89.7 FL (ref 81–99)
MCV RBC AUTO: 94.4 FL (ref 81–99)
MICROALBUMIN UR-MCNC: <1.2 MG/DL (ref 0–19)
MICROALBUMIN/CREAT UR-RTO: NORMAL MG/G
MONOCYTES ABSOLUTE: 0.5 K/UL (ref 0–0.9)
MONOCYTES ABSOLUTE: 0.6 K/UL (ref 0–0.9)
MONOCYTES ABSOLUTE: 0.6 K/UL (ref 0–0.9)
MONOCYTES ABSOLUTE: 0.7 K/UL (ref 0–0.9)
MONOCYTES RELATIVE PERCENT: 10.1 % (ref 0–10)
MONOCYTES RELATIVE PERCENT: 10.2 % (ref 0–10)
MONOCYTES RELATIVE PERCENT: 6.4 % (ref 0–10)
MONOCYTES RELATIVE PERCENT: 7.1 % (ref 0–10)
MONOCYTES RELATIVE PERCENT: 7.4 % (ref 0–10)
MONOCYTES RELATIVE PERCENT: 9.7 % (ref 0–10)
NEUTROPHILS ABSOLUTE: 4.1 K/UL (ref 1.5–7.5)
NEUTROPHILS ABSOLUTE: 4.3 K/UL (ref 1.5–7.5)
NEUTROPHILS ABSOLUTE: 4.3 K/UL (ref 1.5–7.5)
NEUTROPHILS ABSOLUTE: 4.8 K/UL (ref 1.5–7.5)
NEUTROPHILS ABSOLUTE: 5.5 K/UL (ref 1.5–7.5)
NEUTROPHILS ABSOLUTE: 5.6 K/UL (ref 1.5–7.5)
NEUTROPHILS RELATIVE PERCENT: 67.9 % (ref 50–65)
NEUTROPHILS RELATIVE PERCENT: 68.2 % (ref 50–65)
NEUTROPHILS RELATIVE PERCENT: 68.3 % (ref 50–65)
NEUTROPHILS RELATIVE PERCENT: 70.1 % (ref 50–65)
NEUTROPHILS RELATIVE PERCENT: 72.7 % (ref 50–65)
NEUTROPHILS RELATIVE PERCENT: 73.6 % (ref 50–65)
NITRITE, URINE: NEGATIVE
NOROVIRUS GI GII PCR: NOT DETECTED
OCCULT BLOOD QC: NORMAL
OCCULT BLOOD SCREENING: NORMAL
OPIATE SCREEN URINE: NEGATIVE
PARATHYROID HORMONE INTACT: 67.9 PG/ML (ref 15–65)
PDW BLD-RTO: 14.7 % (ref 11.5–14.5)
PDW BLD-RTO: 15.1 % (ref 11.5–14.5)
PDW BLD-RTO: 15.8 % (ref 11.5–14.5)
PDW BLD-RTO: 16 % (ref 11.5–14.5)
PDW BLD-RTO: 16.3 % (ref 11.5–14.5)
PDW BLD-RTO: 16.3 % (ref 11.5–14.5)
PERFORMED ON: ABNORMAL
PH UA: 6 (ref 5–8)
PHOSPHORUS: 3.7 MG/DL (ref 2.5–4.5)
PLATELET # BLD: 161 K/UL (ref 130–400)
PLATELET # BLD: 188 K/UL (ref 130–400)
PLATELET # BLD: 195 K/UL (ref 130–400)
PLATELET # BLD: 211 K/UL (ref 130–400)
PLATELET # BLD: 211 K/UL (ref 130–400)
PLATELET # BLD: 229 K/UL (ref 130–400)
PLESIOMONAS SHIGELLOIDES PCR: NOT DETECTED
PMV BLD AUTO: 10 FL (ref 9.4–12.3)
PMV BLD AUTO: 9.1 FL (ref 9.4–12.3)
PMV BLD AUTO: 9.6 FL (ref 9.4–12.3)
PMV BLD AUTO: 9.7 FL (ref 9.4–12.3)
PMV BLD AUTO: 9.7 FL (ref 9.4–12.3)
PMV BLD AUTO: 9.8 FL (ref 9.4–12.3)
POTASSIUM REFLEX MAGNESIUM: 2.5 MMOL/L (ref 3.5–5)
POTASSIUM SERPL-SCNC: 2.4 MMOL/L (ref 3.5–5)
POTASSIUM SERPL-SCNC: 2.5 MMOL/L (ref 3.5–5)
POTASSIUM SERPL-SCNC: 3.2 MMOL/L (ref 3.5–5)
POTASSIUM SERPL-SCNC: 3.4 MMOL/L (ref 3.5–5)
POTASSIUM SERPL-SCNC: 3.4 MMOL/L (ref 3.5–5)
POTASSIUM SERPL-SCNC: 4 MMOL/L (ref 3.5–5)
POTASSIUM SERPL-SCNC: 4.3 MMOL/L (ref 3.5–5)
POTASSIUM SERPL-SCNC: 4.3 MMOL/L (ref 3.5–5)
PROTEIN UA: NEGATIVE MG/DL
RBC # BLD: 3.52 M/UL (ref 4.2–5.4)
RBC # BLD: 4.04 M/UL (ref 4.2–5.4)
RBC # BLD: 4.16 M/UL (ref 4.2–5.4)
RBC # BLD: 4.18 M/UL (ref 4.2–5.4)
RBC # BLD: 4.27 M/UL (ref 4.2–5.4)
RBC # BLD: 4.36 M/UL (ref 4.2–5.4)
RBC UA: 13 /HPF (ref 0–4)
ROTAVIRUS A PCR: NOT DETECTED
SALMONELLA PCR: NOT DETECTED
SAPOVIRUS PCR: NOT DETECTED
SARS-COV-2, NAAT: NOT DETECTED
SARS-COV-2, NAAT: NOT DETECTED
SHIGA-LIKE TOXIN-PRODUCING E. COLI (STEC) STX1/STX2: NOT DETECTED
SODIUM BLD-SCNC: 137 MMOL/L (ref 136–145)
SODIUM BLD-SCNC: 139 MMOL/L (ref 136–145)
SODIUM BLD-SCNC: 139 MMOL/L (ref 136–145)
SODIUM BLD-SCNC: 140 MMOL/L (ref 136–145)
SODIUM BLD-SCNC: 141 MMOL/L (ref 136–145)
SODIUM BLD-SCNC: 142 MMOL/L (ref 136–145)
SODIUM BLD-SCNC: 143 MMOL/L (ref 136–145)
SODIUM BLD-SCNC: 144 MMOL/L (ref 136–145)
SPECIFIC GRAVITY UA: 1.01 (ref 1–1.03)
T4 FREE: 1.42 NG/DL (ref 0.93–1.7)
T4 FREE: 1.56 NG/DL (ref 0.93–1.7)
T4 FREE: 1.82 NG/DL (ref 0.93–1.7)
TOTAL PROTEIN: 4.9 G/DL (ref 6.6–8.7)
TOTAL PROTEIN: 5.6 G/DL (ref 6.6–8.7)
TOTAL PROTEIN: 6.5 G/DL (ref 6.6–8.7)
TOTAL PROTEIN: 6.7 G/DL (ref 6.6–8.7)
TOTAL PROTEIN: 6.9 G/DL (ref 6.6–8.7)
TOTAL PROTEIN: 7 G/DL (ref 6.6–8.7)
TOTAL PROTEIN: 7 G/DL (ref 6.6–8.7)
TRIGL SERPL-MCNC: 65 MG/DL (ref 0–149)
TRIGL SERPL-MCNC: 99 MG/DL (ref 0–149)
TSH REFLEX FT4: 3.89 UIU/ML (ref 0.35–5.5)
TSH SERPL DL<=0.05 MIU/L-ACNC: 0.02 UIU/ML (ref 0.27–4.2)
TSH SERPL DL<=0.05 MIU/L-ACNC: 1.22 UIU/ML (ref 0.27–4.2)
TSH SERPL DL<=0.05 MIU/L-ACNC: 2.93 UIU/ML (ref 0.27–4.2)
UROBILINOGEN, URINE: 0.2 E.U./DL
VIBRIO CHOLERAE PCR: NOT DETECTED
VIBRIO PCR: NOT DETECTED
VITAMIN B-12: 587 PG/ML (ref 211–946)
VITAMIN B-12: 791 PG/ML (ref 211–946)
VITAMIN D 25-HYDROXY: 51.1 NG/ML
VITAMIN D 25-HYDROXY: 61.3 NG/ML
VITAMIN D 25-HYDROXY: 62 NG/ML
WBC # BLD: 6 K/UL (ref 4.8–10.8)
WBC # BLD: 6.2 K/UL (ref 4.8–10.8)
WBC # BLD: 6.3 K/UL (ref 4.8–10.8)
WBC # BLD: 7.1 K/UL (ref 4.8–10.8)
WBC # BLD: 7.5 K/UL (ref 4.8–10.8)
WBC # BLD: 7.8 K/UL (ref 4.8–10.8)
WBC UA: 9 /HPF (ref 0–5)
YERSINIA ENTEROCOLITICA PCR: NOT DETECTED

## 2020-01-01 PROCEDURE — 2580000003 HC RX 258: Performed by: INTERNAL MEDICINE

## 2020-01-01 PROCEDURE — 74176 CT ABD & PELVIS W/O CONTRAST: CPT

## 2020-01-01 PROCEDURE — 99214 OFFICE O/P EST MOD 30 MIN: CPT | Performed by: INTERNAL MEDICINE

## 2020-01-01 PROCEDURE — 87449 NOS EACH ORGANISM AG IA: CPT

## 2020-01-01 PROCEDURE — 83735 ASSAY OF MAGNESIUM: CPT

## 2020-01-01 PROCEDURE — 83036 HEMOGLOBIN GLYCOSYLATED A1C: CPT

## 2020-01-01 PROCEDURE — 85025 COMPLETE CBC W/AUTO DIFF WBC: CPT

## 2020-01-01 PROCEDURE — 87015 SPECIMEN INFECT AGNT CONCNTJ: CPT

## 2020-01-01 PROCEDURE — G0378 HOSPITAL OBSERVATION PER HR: HCPCS

## 2020-01-01 PROCEDURE — 2500000003 HC RX 250 WO HCPCS: Performed by: INTERNAL MEDICINE

## 2020-01-01 PROCEDURE — 4040F PNEUMOC VAC/ADMIN/RCVD: CPT | Performed by: INTERNAL MEDICINE

## 2020-01-01 PROCEDURE — 93000 ELECTROCARDIOGRAM COMPLETE: CPT | Performed by: INTERNAL MEDICINE

## 2020-01-01 PROCEDURE — 1036F TOBACCO NON-USER: CPT | Performed by: INTERNAL MEDICINE

## 2020-01-01 PROCEDURE — 0097U HC GI PTHGN MULT REV TRANS & AMP PRB TECH 22 TRGT: CPT

## 2020-01-01 PROCEDURE — 1123F ACP DISCUSS/DSCN MKR DOCD: CPT | Performed by: INTERNAL MEDICINE

## 2020-01-01 PROCEDURE — 87045 FECES CULTURE AEROBIC BACT: CPT

## 2020-01-01 PROCEDURE — 87324 CLOSTRIDIUM AG IA: CPT

## 2020-01-01 PROCEDURE — 83690 ASSAY OF LIPASE: CPT

## 2020-01-01 PROCEDURE — 1090F PRES/ABSN URINE INCON ASSESS: CPT | Performed by: INTERNAL MEDICINE

## 2020-01-01 PROCEDURE — 6370000000 HC RX 637 (ALT 250 FOR IP): Performed by: INTERNAL MEDICINE

## 2020-01-01 PROCEDURE — 84132 ASSAY OF SERUM POTASSIUM: CPT

## 2020-01-01 PROCEDURE — 92610 EVALUATE SWALLOWING FUNCTION: CPT

## 2020-01-01 PROCEDURE — U0002 COVID-19 LAB TEST NON-CDC: HCPCS

## 2020-01-01 PROCEDURE — 80061 LIPID PANEL: CPT

## 2020-01-01 PROCEDURE — 6370000000 HC RX 637 (ALT 250 FOR IP): Performed by: EMERGENCY MEDICINE

## 2020-01-01 PROCEDURE — 80307 DRUG TEST PRSMV CHEM ANLYZR: CPT

## 2020-01-01 PROCEDURE — 99232 SBSQ HOSP IP/OBS MODERATE 35: CPT | Performed by: INTERNAL MEDICINE

## 2020-01-01 PROCEDURE — G8482 FLU IMMUNIZE ORDER/ADMIN: HCPCS | Performed by: INTERNAL MEDICINE

## 2020-01-01 PROCEDURE — G8427 DOCREV CUR MEDS BY ELIG CLIN: HCPCS | Performed by: INTERNAL MEDICINE

## 2020-01-01 PROCEDURE — G8417 CALC BMI ABV UP PARAM F/U: HCPCS | Performed by: INTERNAL MEDICINE

## 2020-01-01 PROCEDURE — 87427 SHIGA-LIKE TOXIN AG IA: CPT

## 2020-01-01 PROCEDURE — 82607 VITAMIN B-12: CPT

## 2020-01-01 PROCEDURE — 82947 ASSAY GLUCOSE BLOOD QUANT: CPT

## 2020-01-01 PROCEDURE — 6370000000 HC RX 637 (ALT 250 FOR IP): Performed by: HOSPITALIST

## 2020-01-01 PROCEDURE — 80053 COMPREHEN METABOLIC PANEL: CPT

## 2020-01-01 PROCEDURE — 96366 THER/PROPH/DIAG IV INF ADDON: CPT

## 2020-01-01 PROCEDURE — 6360000002 HC RX W HCPCS: Performed by: INTERNAL MEDICINE

## 2020-01-01 PROCEDURE — 36415 COLL VENOUS BLD VENIPUNCTURE: CPT

## 2020-01-01 PROCEDURE — 84443 ASSAY THYROID STIM HORMONE: CPT

## 2020-01-01 PROCEDURE — 99283 EMERGENCY DEPT VISIT LOW MDM: CPT

## 2020-01-01 PROCEDURE — 96368 THER/DIAG CONCURRENT INF: CPT

## 2020-01-01 PROCEDURE — G0439 PPPS, SUBSEQ VISIT: HCPCS | Performed by: INTERNAL MEDICINE

## 2020-01-01 PROCEDURE — 87899 AGENT NOS ASSAY W/OPTIC: CPT

## 2020-01-01 PROCEDURE — 87186 SC STD MICRODIL/AGAR DIL: CPT

## 2020-01-01 PROCEDURE — 83516 IMMUNOASSAY NONANTIBODY: CPT

## 2020-01-01 PROCEDURE — 87086 URINE CULTURE/COLONY COUNT: CPT

## 2020-01-01 PROCEDURE — 1210000000 HC MED SURG R&B

## 2020-01-01 PROCEDURE — 76770 US EXAM ABDO BACK WALL COMP: CPT

## 2020-01-01 PROCEDURE — 2580000003 HC RX 258: Performed by: EMERGENCY MEDICINE

## 2020-01-01 PROCEDURE — 94660 CPAP INITIATION&MGMT: CPT

## 2020-01-01 PROCEDURE — G0328 FECAL BLOOD SCRN IMMUNOASSAY: HCPCS

## 2020-01-01 PROCEDURE — G8428 CUR MEDS NOT DOCUMENT: HCPCS | Performed by: INTERNAL MEDICINE

## 2020-01-01 PROCEDURE — 96375 TX/PRO/DX INJ NEW DRUG ADDON: CPT

## 2020-01-01 PROCEDURE — 99204 OFFICE O/P NEW MOD 45 MIN: CPT | Performed by: UROLOGY

## 2020-01-01 PROCEDURE — G0379 DIRECT REFER HOSPITAL OBSERV: HCPCS

## 2020-01-01 PROCEDURE — 71250 CT THORAX DX C-: CPT

## 2020-01-01 PROCEDURE — 92526 ORAL FUNCTION THERAPY: CPT

## 2020-01-01 PROCEDURE — 82705 FATS/LIPIDS FECES QUAL: CPT

## 2020-01-01 PROCEDURE — 99999 PR OFFICE/OUTPT VISIT,PROCEDURE ONLY: CPT | Performed by: EMERGENCY MEDICINE

## 2020-01-01 PROCEDURE — 99222 1ST HOSP IP/OBS MODERATE 55: CPT | Performed by: INTERNAL MEDICINE

## 2020-01-01 PROCEDURE — 71045 X-RAY EXAM CHEST 1 VIEW: CPT

## 2020-01-01 PROCEDURE — 96365 THER/PROPH/DIAG IV INF INIT: CPT

## 2020-01-01 RX ORDER — POTASSIUM CHLORIDE 20 MEQ/1
40 TABLET, EXTENDED RELEASE ORAL ONCE
Status: COMPLETED | OUTPATIENT
Start: 2020-01-01 | End: 2020-01-01

## 2020-01-01 RX ORDER — ACETAMINOPHEN 650 MG/1
650 SUPPOSITORY RECTAL EVERY 6 HOURS PRN
Status: DISCONTINUED | OUTPATIENT
Start: 2020-01-01 | End: 2021-01-01 | Stop reason: HOSPADM

## 2020-01-01 RX ORDER — CARVEDILOL 25 MG/1
TABLET ORAL
Qty: 180 TABLET | Refills: 1 | Status: SHIPPED | OUTPATIENT
Start: 2020-01-01

## 2020-01-01 RX ORDER — CARVEDILOL 25 MG/1
TABLET ORAL
Qty: 180 TABLET | Refills: 1 | Status: SHIPPED
Start: 2020-01-01 | End: 2020-01-01 | Stop reason: CLARIF

## 2020-01-01 RX ORDER — BLOOD-GLUCOSE METER
KIT MISCELLANEOUS
Qty: 50 STRIP | Refills: 11 | Status: SHIPPED | OUTPATIENT
Start: 2020-01-01

## 2020-01-01 RX ORDER — POTASSIUM CHLORIDE 20 MEQ/1
TABLET, EXTENDED RELEASE ORAL
Qty: 90 TABLET | Refills: 3 | Status: SHIPPED | OUTPATIENT
Start: 2020-01-01 | End: 2020-01-01 | Stop reason: SDUPTHER

## 2020-01-01 RX ORDER — SODIUM CHLORIDE 0.9 % (FLUSH) 0.9 %
10 SYRINGE (ML) INJECTION EVERY 12 HOURS SCHEDULED
Status: DISCONTINUED | OUTPATIENT
Start: 2020-01-01 | End: 2021-01-01 | Stop reason: HOSPADM

## 2020-01-01 RX ORDER — METRONIDAZOLE 500 MG/1
500 TABLET ORAL EVERY 8 HOURS SCHEDULED
Status: DISCONTINUED | OUTPATIENT
Start: 2020-01-01 | End: 2021-01-01 | Stop reason: HOSPADM

## 2020-01-01 RX ORDER — PRAVASTATIN SODIUM 40 MG
TABLET ORAL
Qty: 90 TABLET | Refills: 1 | Status: SHIPPED | OUTPATIENT
Start: 2020-01-01

## 2020-01-01 RX ORDER — DEXTROSE MONOHYDRATE 50 MG/ML
100 INJECTION, SOLUTION INTRAVENOUS PRN
Status: DISCONTINUED | OUTPATIENT
Start: 2020-01-01 | End: 2021-01-01 | Stop reason: HOSPADM

## 2020-01-01 RX ORDER — CIPROFLOXACIN 2 MG/ML
400 INJECTION, SOLUTION INTRAVENOUS EVERY 24 HOURS
Status: DISCONTINUED | OUTPATIENT
Start: 2020-01-01 | End: 2021-01-01 | Stop reason: HOSPADM

## 2020-01-01 RX ORDER — RANOLAZINE 500 MG/1
TABLET, EXTENDED RELEASE ORAL
Qty: 180 TABLET | Refills: 1 | Status: SHIPPED | OUTPATIENT
Start: 2020-01-01

## 2020-01-01 RX ORDER — ACETAMINOPHEN 325 MG/1
650 TABLET ORAL EVERY 6 HOURS PRN
Status: DISCONTINUED | OUTPATIENT
Start: 2020-01-01 | End: 2021-01-01 | Stop reason: HOSPADM

## 2020-01-01 RX ORDER — POTASSIUM CHLORIDE 20 MEQ/1
TABLET, EXTENDED RELEASE ORAL
Qty: 90 TABLET | Refills: 3 | Status: ON HOLD | OUTPATIENT
Start: 2020-01-01 | End: 2021-01-01 | Stop reason: SDUPTHER

## 2020-01-01 RX ORDER — 0.9 % SODIUM CHLORIDE 0.9 %
1000 INTRAVENOUS SOLUTION INTRAVENOUS ONCE
Status: COMPLETED | OUTPATIENT
Start: 2020-01-01 | End: 2020-01-01

## 2020-01-01 RX ORDER — OLMESARTAN MEDOXOMIL 40 MG/1
TABLET ORAL
Qty: 90 TABLET | Refills: 1 | Status: SHIPPED | OUTPATIENT
Start: 2020-01-01

## 2020-01-01 RX ORDER — RANOLAZINE 500 MG/1
TABLET, EXTENDED RELEASE ORAL
Qty: 180 TABLET | Refills: 1 | Status: SHIPPED | OUTPATIENT
Start: 2020-01-01 | End: 2020-01-01

## 2020-01-01 RX ORDER — PROMETHAZINE HYDROCHLORIDE 25 MG/ML
25 INJECTION, SOLUTION INTRAMUSCULAR; INTRAVENOUS EVERY 6 HOURS PRN
Status: DISCONTINUED | OUTPATIENT
Start: 2020-01-01 | End: 2021-01-01 | Stop reason: HOSPADM

## 2020-01-01 RX ORDER — AMLODIPINE BESYLATE 10 MG/1
TABLET ORAL
Qty: 90 TABLET | Refills: 1 | Status: SHIPPED | OUTPATIENT
Start: 2020-01-01

## 2020-01-01 RX ORDER — PRAVASTATIN SODIUM 20 MG
40 TABLET ORAL EVERY EVENING
Status: DISCONTINUED | OUTPATIENT
Start: 2020-01-01 | End: 2021-01-01 | Stop reason: HOSPADM

## 2020-01-01 RX ORDER — DIPHENOXYLATE HYDROCHLORIDE AND ATROPINE SULFATE 2.5; .025 MG/1; MG/1
1 TABLET ORAL 2 TIMES DAILY PRN
Qty: 20 TABLET | Refills: 0 | Status: SHIPPED | OUTPATIENT
Start: 2020-01-01 | End: 2020-01-01 | Stop reason: SDUPTHER

## 2020-01-01 RX ORDER — AMLODIPINE BESYLATE 10 MG/1
TABLET ORAL
Qty: 90 TABLET | Refills: 1 | Status: SHIPPED
Start: 2020-01-01 | End: 2020-01-01 | Stop reason: CLARIF

## 2020-01-01 RX ORDER — POLYETHYLENE GLYCOL 3350 17 G/17G
17 POWDER, FOR SOLUTION ORAL DAILY PRN
Status: DISCONTINUED | OUTPATIENT
Start: 2020-01-01 | End: 2021-01-01 | Stop reason: HOSPADM

## 2020-01-01 RX ORDER — RANOLAZINE 500 MG/1
500 TABLET, EXTENDED RELEASE ORAL 2 TIMES DAILY
Status: DISCONTINUED | OUTPATIENT
Start: 2020-01-01 | End: 2021-01-01 | Stop reason: HOSPADM

## 2020-01-01 RX ORDER — PRAVASTATIN SODIUM 40 MG
TABLET ORAL
Qty: 90 TABLET | Refills: 1 | Status: SHIPPED
Start: 2020-01-01 | End: 2020-01-01 | Stop reason: CLARIF

## 2020-01-01 RX ORDER — NICOTINE POLACRILEX 4 MG
15 LOZENGE BUCCAL PRN
Status: DISCONTINUED | OUTPATIENT
Start: 2020-01-01 | End: 2021-01-01 | Stop reason: HOSPADM

## 2020-01-01 RX ORDER — CARVEDILOL 25 MG/1
25 TABLET ORAL 2 TIMES DAILY WITH MEALS
Status: DISCONTINUED | OUTPATIENT
Start: 2020-01-01 | End: 2021-01-01 | Stop reason: HOSPADM

## 2020-01-01 RX ORDER — CARVEDILOL 25 MG/1
TABLET ORAL
Qty: 180 TABLET | Refills: 1 | Status: SHIPPED | OUTPATIENT
Start: 2020-01-01 | End: 2020-01-01

## 2020-01-01 RX ORDER — FUROSEMIDE 40 MG/1
TABLET ORAL
Qty: 135 TABLET | Refills: 1 | Status: SHIPPED | OUTPATIENT
Start: 2020-01-01 | End: 2020-01-01

## 2020-01-01 RX ORDER — LEVOTHYROXINE SODIUM 0.07 MG/1
75 TABLET ORAL DAILY
Status: DISCONTINUED | OUTPATIENT
Start: 2020-01-01 | End: 2021-01-01 | Stop reason: HOSPADM

## 2020-01-01 RX ORDER — LEVOTHYROXINE SODIUM 0.07 MG/1
TABLET ORAL
Qty: 30 TABLET | Refills: 5 | Status: SHIPPED | OUTPATIENT
Start: 2020-01-01

## 2020-01-01 RX ORDER — MAGNESIUM SULFATE IN WATER 40 MG/ML
4 INJECTION, SOLUTION INTRAVENOUS ONCE
Status: COMPLETED | OUTPATIENT
Start: 2020-01-01 | End: 2020-01-01

## 2020-01-01 RX ORDER — ERGOCALCIFEROL 1.25 MG/1
CAPSULE ORAL
Qty: 12 CAPSULE | Refills: 1 | Status: SHIPPED
Start: 2020-01-01 | End: 2020-01-01 | Stop reason: CLARIF

## 2020-01-01 RX ORDER — MAGNESIUM SULFATE IN WATER 40 MG/ML
2 INJECTION, SOLUTION INTRAVENOUS PRN
Status: DISCONTINUED | OUTPATIENT
Start: 2020-01-01 | End: 2021-01-01 | Stop reason: HOSPADM

## 2020-01-01 RX ORDER — LOPERAMIDE HYDROCHLORIDE 2 MG/1
2 CAPSULE ORAL PRN
Qty: 15 CAPSULE | Refills: 0 | Status: SHIPPED | OUTPATIENT
Start: 2020-01-01 | End: 2020-01-01

## 2020-01-01 RX ORDER — FUROSEMIDE 40 MG/1
TABLET ORAL
Qty: 135 TABLET | Refills: 1 | Status: ON HOLD
Start: 2020-01-01 | End: 2021-01-01 | Stop reason: HOSPADM

## 2020-01-01 RX ORDER — BLOOD-GLUCOSE METER
KIT MISCELLANEOUS
Qty: 50 STRIP | Refills: 11 | Status: SHIPPED | OUTPATIENT
Start: 2020-01-01 | End: 2020-01-01 | Stop reason: SDUPTHER

## 2020-01-01 RX ORDER — CIPROFLOXACIN 2 MG/ML
400 INJECTION, SOLUTION INTRAVENOUS EVERY 12 HOURS
Status: DISCONTINUED | OUTPATIENT
Start: 2020-01-01 | End: 2020-01-01

## 2020-01-01 RX ORDER — LEVOTHYROXINE SODIUM 0.07 MG/1
75 TABLET ORAL DAILY
Qty: 30 TABLET | Refills: 5 | Status: SHIPPED | OUTPATIENT
Start: 2020-01-01 | End: 2020-01-01

## 2020-01-01 RX ORDER — OLMESARTAN MEDOXOMIL 40 MG/1
TABLET ORAL
Qty: 90 TABLET | Refills: 1 | Status: SHIPPED | OUTPATIENT
Start: 2020-01-01 | End: 2020-01-01

## 2020-01-01 RX ORDER — PANTOPRAZOLE SODIUM 40 MG/1
40 TABLET, DELAYED RELEASE ORAL
Status: DISCONTINUED | OUTPATIENT
Start: 2020-01-01 | End: 2021-01-01

## 2020-01-01 RX ORDER — OLMESARTAN MEDOXOMIL 40 MG/1
TABLET ORAL
Qty: 90 TABLET | Refills: 1 | Status: SHIPPED
Start: 2020-01-01 | End: 2020-01-01 | Stop reason: CLARIF

## 2020-01-01 RX ORDER — DIPHENOXYLATE HYDROCHLORIDE AND ATROPINE SULFATE 2.5; .025 MG/1; MG/1
1 TABLET ORAL 2 TIMES DAILY PRN
Qty: 20 TABLET | Refills: 0 | Status: SHIPPED
Start: 2020-01-01 | End: 2020-01-01 | Stop reason: SDUPTHER

## 2020-01-01 RX ORDER — DEXTROSE MONOHYDRATE 25 G/50ML
12.5 INJECTION, SOLUTION INTRAVENOUS PRN
Status: DISCONTINUED | OUTPATIENT
Start: 2020-01-01 | End: 2021-01-01 | Stop reason: HOSPADM

## 2020-01-01 RX ORDER — POTASSIUM CHLORIDE 20 MEQ/1
40 TABLET, EXTENDED RELEASE ORAL 2 TIMES DAILY
Status: COMPLETED | OUTPATIENT
Start: 2020-01-01 | End: 2021-01-01

## 2020-01-01 RX ORDER — FUROSEMIDE 40 MG/1
TABLET ORAL
Qty: 135 TABLET | Refills: 1 | Status: SHIPPED
Start: 2020-01-01 | End: 2020-01-01 | Stop reason: CLARIF

## 2020-01-01 RX ORDER — SODIUM PHOSPHATE, DIBASIC AND SODIUM PHOSPHATE, MONOBASIC 7; 19 G/133ML; G/133ML
1 ENEMA RECTAL ONCE
Status: COMPLETED | OUTPATIENT
Start: 2021-01-01 | End: 2021-01-01

## 2020-01-01 RX ORDER — SODIUM CHLORIDE 0.9 % (FLUSH) 0.9 %
10 SYRINGE (ML) INJECTION PRN
Status: DISCONTINUED | OUTPATIENT
Start: 2020-01-01 | End: 2021-01-01 | Stop reason: HOSPADM

## 2020-01-01 RX ORDER — RANOLAZINE 500 MG/1
TABLET, EXTENDED RELEASE ORAL
Qty: 180 TABLET | Refills: 1 | Status: SHIPPED
Start: 2020-01-01 | End: 2020-01-01 | Stop reason: CLARIF

## 2020-01-01 RX ORDER — AMLODIPINE BESYLATE 10 MG/1
TABLET ORAL
Qty: 90 TABLET | Refills: 1 | Status: SHIPPED | OUTPATIENT
Start: 2020-01-01 | End: 2020-01-01

## 2020-01-01 RX ORDER — AMLODIPINE BESYLATE 10 MG/1
10 TABLET ORAL DAILY
Status: DISCONTINUED | OUTPATIENT
Start: 2020-01-01 | End: 2021-01-01 | Stop reason: HOSPADM

## 2020-01-01 RX ORDER — PRAVASTATIN SODIUM 40 MG
TABLET ORAL
Qty: 90 TABLET | Refills: 1 | Status: SHIPPED | OUTPATIENT
Start: 2020-01-01 | End: 2020-01-01

## 2020-01-01 RX ORDER — PROMETHAZINE HYDROCHLORIDE 50 MG/ML
25 INJECTION, SOLUTION INTRAMUSCULAR; INTRAVENOUS EVERY 6 HOURS PRN
Status: DISCONTINUED | OUTPATIENT
Start: 2020-01-01 | End: 2020-01-01 | Stop reason: SDUPTHER

## 2020-01-01 RX ORDER — LEVOTHYROXINE SODIUM 0.07 MG/1
75 TABLET ORAL DAILY
Qty: 30 TABLET | Refills: 5 | Status: SHIPPED
Start: 2020-01-01 | End: 2020-01-01 | Stop reason: CLARIF

## 2020-01-01 RX ORDER — POTASSIUM CHLORIDE 7.45 MG/ML
10 INJECTION INTRAVENOUS PRN
Status: DISCONTINUED | OUTPATIENT
Start: 2020-01-01 | End: 2021-01-01 | Stop reason: HOSPADM

## 2020-01-01 RX ORDER — POTASSIUM CHLORIDE 20 MEQ/1
40 TABLET, EXTENDED RELEASE ORAL PRN
Status: DISCONTINUED | OUTPATIENT
Start: 2020-01-01 | End: 2021-01-01 | Stop reason: HOSPADM

## 2020-01-01 RX ORDER — POLYETHYLENE GLYCOL 3350 17 G/17G
100 POWDER, FOR SOLUTION ORAL ONCE
Status: COMPLETED | OUTPATIENT
Start: 2020-01-01 | End: 2020-01-01

## 2020-01-01 RX ORDER — POTASSIUM CHLORIDE 3 G/15ML
40 SOLUTION ORAL ONCE
Status: DISCONTINUED | OUTPATIENT
Start: 2020-01-01 | End: 2020-01-01 | Stop reason: CLARIF

## 2020-01-01 RX ORDER — ACETAMINOPHEN 160 MG
TABLET,DISINTEGRATING ORAL DAILY
COMMUNITY

## 2020-01-01 RX ADMIN — PRAVASTATIN SODIUM 40 MG: 20 TABLET ORAL at 18:04

## 2020-01-01 RX ADMIN — POLYETHYLENE GLYCOL 3350 100 G: 17 POWDER, FOR SOLUTION ORAL at 16:23

## 2020-01-01 RX ADMIN — POTASSIUM CHLORIDE 40 MEQ: 1500 TABLET, EXTENDED RELEASE ORAL at 08:44

## 2020-01-01 RX ADMIN — POTASSIUM BICARBONATE 40 MEQ: 782 TABLET, EFFERVESCENT ORAL at 14:05

## 2020-01-01 RX ADMIN — CARVEDILOL 25 MG: 25 TABLET, FILM COATED ORAL at 08:44

## 2020-01-01 RX ADMIN — PANTOPRAZOLE SODIUM 40 MG: 40 TABLET, DELAYED RELEASE ORAL at 17:21

## 2020-01-01 RX ADMIN — CIPROFLOXACIN 400 MG: 2 INJECTION, SOLUTION INTRAVENOUS at 13:34

## 2020-01-01 RX ADMIN — BISACODYL 10 MG: 5 TABLET, COATED ORAL at 16:22

## 2020-01-01 RX ADMIN — POTASSIUM CHLORIDE 10 MEQ: 10 INJECTION, SOLUTION INTRAVENOUS at 14:01

## 2020-01-01 RX ADMIN — SODIUM BICARBONATE: 84 INJECTION INTRAVENOUS at 22:33

## 2020-01-01 RX ADMIN — POTASSIUM CHLORIDE 10 MEQ: 10 INJECTION, SOLUTION INTRAVENOUS at 18:32

## 2020-01-01 RX ADMIN — PRAVASTATIN SODIUM 40 MG: 20 TABLET ORAL at 17:17

## 2020-01-01 RX ADMIN — POTASSIUM CHLORIDE 10 MEQ: 10 INJECTION, SOLUTION INTRAVENOUS at 08:43

## 2020-01-01 RX ADMIN — CARVEDILOL 25 MG: 25 TABLET, FILM COATED ORAL at 09:42

## 2020-01-01 RX ADMIN — DRONEDARONE 400 MG: 400 TABLET, FILM COATED ORAL at 17:18

## 2020-01-01 RX ADMIN — PANTOPRAZOLE SODIUM 40 MG: 40 TABLET, DELAYED RELEASE ORAL at 09:50

## 2020-01-01 RX ADMIN — METRONIDAZOLE 500 MG: 500 TABLET ORAL at 17:21

## 2020-01-01 RX ADMIN — METRONIDAZOLE 500 MG: 500 TABLET ORAL at 00:58

## 2020-01-01 RX ADMIN — METRONIDAZOLE 500 MG: 500 TABLET ORAL at 22:04

## 2020-01-01 RX ADMIN — SODIUM BICARBONATE: 84 INJECTION INTRAVENOUS at 18:27

## 2020-01-01 RX ADMIN — SODIUM CHLORIDE, PRESERVATIVE FREE 10 ML: 5 INJECTION INTRAVENOUS at 21:09

## 2020-01-01 RX ADMIN — POTASSIUM CHLORIDE 40 MEQ: 20 TABLET, EXTENDED RELEASE ORAL at 20:31

## 2020-01-01 RX ADMIN — PROMETHAZINE HYDROCHLORIDE 25 MG: 25 INJECTION INTRAMUSCULAR; INTRAVENOUS at 06:38

## 2020-01-01 RX ADMIN — RANOLAZINE 500 MG: 500 TABLET, FILM COATED, EXTENDED RELEASE ORAL at 09:43

## 2020-01-01 RX ADMIN — CARVEDILOL 25 MG: 25 TABLET, FILM COATED ORAL at 18:04

## 2020-01-01 RX ADMIN — DRONEDARONE 400 MG: 400 TABLET, FILM COATED ORAL at 18:04

## 2020-01-01 RX ADMIN — DRONEDARONE 400 MG: 400 TABLET, FILM COATED ORAL at 16:30

## 2020-01-01 RX ADMIN — POTASSIUM CHLORIDE 40 MEQ: 20 TABLET, EXTENDED RELEASE ORAL at 17:21

## 2020-01-01 RX ADMIN — METRONIDAZOLE 500 MG: 500 TABLET ORAL at 13:34

## 2020-01-01 RX ADMIN — DRONEDARONE 400 MG: 400 TABLET, FILM COATED ORAL at 08:43

## 2020-01-01 RX ADMIN — POTASSIUM CHLORIDE 10 MEQ: 10 INJECTION, SOLUTION INTRAVENOUS at 16:21

## 2020-01-01 RX ADMIN — LEVOTHYROXINE SODIUM 75 MCG: 75 TABLET ORAL at 09:42

## 2020-01-01 RX ADMIN — SODIUM CHLORIDE, PRESERVATIVE FREE 10 ML: 5 INJECTION INTRAVENOUS at 08:45

## 2020-01-01 RX ADMIN — CIPROFLOXACIN 400 MG: 2 INJECTION, SOLUTION INTRAVENOUS at 11:15

## 2020-01-01 RX ADMIN — RANOLAZINE 500 MG: 500 TABLET, FILM COATED, EXTENDED RELEASE ORAL at 21:03

## 2020-01-01 RX ADMIN — AMLODIPINE BESYLATE 10 MG: 10 TABLET ORAL at 09:42

## 2020-01-01 RX ADMIN — SODIUM CHLORIDE 1000 ML: 9 INJECTION, SOLUTION INTRAVENOUS at 14:06

## 2020-01-01 RX ADMIN — INSULIN LISPRO 1 UNITS: 100 INJECTION, SOLUTION INTRAVENOUS; SUBCUTANEOUS at 21:04

## 2020-01-01 RX ADMIN — POTASSIUM CHLORIDE 40 MEQ: 20 TABLET, EXTENDED RELEASE ORAL at 21:03

## 2020-01-01 RX ADMIN — POTASSIUM CHLORIDE 40 MEQ: 1500 TABLET, EXTENDED RELEASE ORAL at 06:14

## 2020-01-01 RX ADMIN — AMLODIPINE BESYLATE 10 MG: 10 TABLET ORAL at 08:43

## 2020-01-01 RX ADMIN — LEVOTHYROXINE SODIUM 75 MCG: 75 TABLET ORAL at 08:44

## 2020-01-01 RX ADMIN — POTASSIUM CHLORIDE 40 MEQ: 1500 TABLET, EXTENDED RELEASE ORAL at 08:45

## 2020-01-01 RX ADMIN — POTASSIUM CHLORIDE 40 MEQ: 20 TABLET, EXTENDED RELEASE ORAL at 09:43

## 2020-01-01 RX ADMIN — MAGNESIUM SULFATE HEPTAHYDRATE 4 G: 40 INJECTION, SOLUTION INTRAVENOUS at 13:31

## 2020-01-01 RX ADMIN — METRONIDAZOLE 500 MG: 500 TABLET ORAL at 12:21

## 2020-01-01 RX ADMIN — SODIUM BICARBONATE: 84 INJECTION INTRAVENOUS at 09:38

## 2020-01-01 RX ADMIN — RIVAROXABAN 15 MG: 15 TABLET, FILM COATED ORAL at 17:18

## 2020-01-01 RX ADMIN — PRAVASTATIN SODIUM 40 MG: 20 TABLET ORAL at 16:30

## 2020-01-01 RX ADMIN — SODIUM CHLORIDE, PRESERVATIVE FREE 10 ML: 5 INJECTION INTRAVENOUS at 09:45

## 2020-01-01 RX ADMIN — RANOLAZINE 500 MG: 500 TABLET, FILM COATED, EXTENDED RELEASE ORAL at 22:04

## 2020-01-01 RX ADMIN — RANOLAZINE 500 MG: 500 TABLET, FILM COATED, EXTENDED RELEASE ORAL at 20:31

## 2020-01-01 RX ADMIN — METRONIDAZOLE 500 MG: 500 TABLET ORAL at 21:03

## 2020-01-01 RX ADMIN — SODIUM BICARBONATE: 84 INJECTION INTRAVENOUS at 11:15

## 2020-01-01 RX ADMIN — RANOLAZINE 500 MG: 500 TABLET, FILM COATED, EXTENDED RELEASE ORAL at 08:43

## 2020-01-01 RX ADMIN — CIPROFLOXACIN 400 MG: 2 INJECTION, SOLUTION INTRAVENOUS at 18:01

## 2020-01-01 RX ADMIN — CARVEDILOL 25 MG: 25 TABLET, FILM COATED ORAL at 16:22

## 2020-01-01 RX ADMIN — DRONEDARONE 400 MG: 400 TABLET, FILM COATED ORAL at 09:42

## 2020-01-01 SDOH — ECONOMIC STABILITY: INCOME INSECURITY: HOW HARD IS IT FOR YOU TO PAY FOR THE VERY BASICS LIKE FOOD, HOUSING, MEDICAL CARE, AND HEATING?: PATIENT DECLINED

## 2020-01-01 SDOH — ECONOMIC STABILITY: TRANSPORTATION INSECURITY
IN THE PAST 12 MONTHS, HAS THE LACK OF TRANSPORTATION KEPT YOU FROM MEDICAL APPOINTMENTS OR FROM GETTING MEDICATIONS?: PATIENT DECLINED

## 2020-01-01 SDOH — ECONOMIC STABILITY: TRANSPORTATION INSECURITY
IN THE PAST 12 MONTHS, HAS LACK OF TRANSPORTATION KEPT YOU FROM MEETINGS, WORK, OR FROM GETTING THINGS NEEDED FOR DAILY LIVING?: PATIENT DECLINED

## 2020-01-01 SDOH — ECONOMIC STABILITY: FOOD INSECURITY: WITHIN THE PAST 12 MONTHS, THE FOOD YOU BOUGHT JUST DIDN'T LAST AND YOU DIDN'T HAVE MONEY TO GET MORE.: PATIENT DECLINED

## 2020-01-01 SDOH — ECONOMIC STABILITY: FOOD INSECURITY: WITHIN THE PAST 12 MONTHS, YOU WORRIED THAT YOUR FOOD WOULD RUN OUT BEFORE YOU GOT MONEY TO BUY MORE.: PATIENT DECLINED

## 2020-01-01 ASSESSMENT — ENCOUNTER SYMPTOMS
DIARRHEA: 1
RHINORRHEA: 0
WHEEZING: 0
SORE THROAT: 0
WHEEZING: 0
ABDOMINAL PAIN: 0
SORE THROAT: 0
ABDOMINAL PAIN: 0
SHORTNESS OF BREATH: 0
CHEST TIGHTNESS: 0
NAUSEA: 1
WHEEZING: 0
CHEST TIGHTNESS: 0
DIARRHEA: 1
COUGH: 0
CHEST TIGHTNESS: 0
DIARRHEA: 1
RESPIRATORY NEGATIVE: 1
CONSTIPATION: 0
EYES NEGATIVE: 1
COUGH: 0
ABDOMINAL PAIN: 0
NAUSEA: 0
SORE THROAT: 0
CONSTIPATION: 0
WHEEZING: 0
DIARRHEA: 1
VOMITING: 0
ABDOMINAL PAIN: 0
COUGH: 0
SORE THROAT: 0
ABDOMINAL PAIN: 0
CONSTIPATION: 0
CONSTIPATION: 0
ABDOMINAL PAIN: 0
COUGH: 0
BACK PAIN: 0
SORE THROAT: 0
CHEST TIGHTNESS: 0
SHORTNESS OF BREATH: 1

## 2020-01-01 ASSESSMENT — PATIENT HEALTH QUESTIONNAIRE - PHQ9
1. LITTLE INTEREST OR PLEASURE IN DOING THINGS: 0
SUM OF ALL RESPONSES TO PHQ QUESTIONS 1-9: 0
2. FEELING DOWN, DEPRESSED OR HOPELESS: 0
SUM OF ALL RESPONSES TO PHQ9 QUESTIONS 1 & 2: 0
SUM OF ALL RESPONSES TO PHQ QUESTIONS 1-9: 0

## 2020-01-01 ASSESSMENT — LIFESTYLE VARIABLES: HOW OFTEN DO YOU HAVE A DRINK CONTAINING ALCOHOL: 0

## 2020-01-17 ENCOUNTER — CLINICAL SUPPORT (OUTPATIENT)
Dept: CARDIOLOGY | Facility: CLINIC | Age: 85
End: 2020-01-17

## 2020-01-17 DIAGNOSIS — I49.5 SSS (SICK SINUS SYNDROME) (HCC): ICD-10-CM

## 2020-01-17 DIAGNOSIS — Z95.0 PACEMAKER: Primary | ICD-10-CM

## 2020-01-17 PROCEDURE — 93296 REM INTERROG EVL PM/IDS: CPT | Performed by: INTERNAL MEDICINE

## 2020-01-17 PROCEDURE — 93294 REM INTERROG EVL PM/LDLS PM: CPT | Performed by: INTERNAL MEDICINE

## 2020-01-17 NOTE — PROGRESS NOTES
Dual Chamber Pacemaker Evaluation Report  [facility]    January 17, 2020    Primary Cardiologist: Sharath  : Medtronic Model: Adapta ADDR01  Implant date: 6/3/2009     Reason for evaluation: routine  Indication for pacemaker: sick sinus syndrome    Measurements  Atrial sensing - P wave: >=80% mV  Atrial threshold: 0.625V@ 0.4ms  Atrial lead impedance: 571 ohms  Ventricular sensing - R wave: <5.6 to 11.2 mV  Ventricular threshold: 1 V @ 0.4 ms  Ventricular lead impedance:   718 ohms     Diagnostic Data  Atrial paced: 96.5 %  Ventricular paced: 26 %  Other: No new episodes  Battery status: satisfactory   20 months      Final Parameters  Mode:  AAIR+  Lower rate: 60 bpm   Upper rate: 130 bpm  AV Delay: paced- 300 ms  Sensed-280 ms  Atrial - Amplitude: 1.5 V   Pulse width: 0.4 ms   Sensitivity: 0.18 mV     Ventricular - Amplitude: 2 V  Pulse width: 0.4 ms  Sensitivity: 2 mV    Changes made: None  Conclusions: normal pacemaker function, stable pacing and sensing thresholds and adequate battery reserve    Follow up: 6 months

## 2020-04-13 NOTE — PROGRESS NOTES
diabetic polyneuropathy, without long-term current use of insulin ([facility] 10/21/2017    Acquired hypothyroidism 10/21/2017    Mild mitral regurgitation 10/21/2017    Exogenous obesity 10/21/2017    Chronic diastolic congestive heart failure ([facility] 10/21/2017     Overview Note:     Per echo 2016      Mild aortic regurgitation 10/21/2017    Obesity (BMI 30-39.9) 08/29/2017    Blister (nonthermal), left lower leg, initial encounter 07/25/2017    Coronary artery disease due to calcified coronary lesion 07/25/2017     Overview Note:     Post previous stents( dr Dago Kelley)      ROSALES on CPAP      Overview Note:     AHI:  15.3      CPAP (continuous positive airway pressure) dependence      Overview Note:     9cm      Left carotid bruit     Essential hypertension     Permanent atrial fibrillation     Pure hypercholesterolemia      Past Medical History:   Diagnosis Date    A-fib ([facility]     CPAP (continuous positive airway pressure) dependence     9cm    DM (diabetes mellitus screen)     Hyperlipidemia     Hypertension     Hyperthyroidism     Left carotid bruit     Macular degeneration     Obstructive sleep apnea     AHI:  15.3      Past Surgical History:   Procedure Laterality Date    APPENDECTOMY      BLADDER SURGERY      CHOLECYSTECTOMY      PACEMAKER PLACEMENT      PAT AND BSO       Current Outpatient Medications   Medication Sig Dispense Refill    vitamin D (ERGOCALCIFEROL) 1.25 MG (53365 UT) CAPS capsule TAKE ONE CAPSULE BY MOUTH EVERY OTHER SUNDAY 12 capsule 1    potassium chloride (KLOR-CON M) 20 MEQ extended release tablet TAKE ONE TABLET BY MOUTH ONCE DAILY 90 tablet 3    rivaroxaban (XARELTO) 15 MG TABS tablet TAKE ONE TABLET BY MOUTH EVERY DAY 90 tablet 1    ranolazine (RANEXA) 500 MG extended release tablet TAKE ONE TABLET BY MOUTH TWICE A  tablet 1    pravastatin (PRAVACHOL) 40 MG tablet TAKE ONE TABLET BY MOUTH EVERY DAY IN THE EVENING 90 tablet 1    olmesartan (BENICAR) 40 MG tablet TAKE ONE TABLET BY MOUTH EVERY DAY 90 tablet 1    blood glucose test strips (FREESTYLE LITE) strip USE ONE STRIP ONCE DAILY 50 strip 11    furosemide (LASIX) 40 MG tablet TAKE ONE TABLET BY MOUTH EVERY DAY AND EXTRA TABLET TWICE WEEKLY AS NEEDED 135 tablet 1    dronedarone hcl (MULTAQ) 400 MG TABS TAKE ONE TABLET BY MOUTH TWICE A DAY WITH MORNING AND EVENING MEAL 180 tablet 1    carvedilol (COREG) 25 MG tablet TAKE ONE TABLET BY MOUTH TWICE A DAY WITH MEALS 180 tablet 1    amLODIPine (NORVASC) 10 MG tablet TAKE ONE TABLET BY MOUTH EVERY DAY 90 tablet 1    levothyroxine (SYNTHROID) 75 MCG tablet Take 1 tablet by mouth daily 30 tablet 5    acetaminophen (AMINOFEN) 325 MG tablet Take 2 tablets by mouth every 4 hours as needed for Pain 120 tablet 3     No current facility-administered medications for this visit. No Known Allergies  Social History     Tobacco Use    Smoking status: Never Smoker    Smokeless tobacco: Never Used   Substance Use Topics    Alcohol use: No      No family history on file. Review of Systems   Constitutional: Negative for chills, fatigue and fever. HENT: Negative for congestion, ear pain, nosebleeds, postnasal drip and sore throat. Respiratory: Negative for cough, chest tightness and wheezing. Cardiovascular: Positive for leg swelling. Negative for chest pain and palpitations. Gastrointestinal: Negative for abdominal pain and constipation. Genitourinary: Negative for dysuria and urgency. Musculoskeletal: Negative. Negative for arthralgias. Skin: Negative for rash. Neurological: Negative for dizziness and headaches. Psychiatric/Behavioral: Negative. Vitals:    04/13/20 1019   BP: 120/66   Weight: 155 lb (70.3 kg)     Body mass index is 28.35 kg/m².     Physical Exam  PHYSICAL EXAMINATION:  [ INSTRUCTIONS:  \"[x]\" Indicates a positive item  \"[]\" Indicates a negative item  -- DELETE ALL ITEMS NOT EXAMINED]  [x] Alert  [x] Oriented to

## 2020-07-21 NOTE — PROGRESS NOTES
Mary Lou Giles is a 89 y.o. female. Fu of rhyhm, IHD and risks    History of Present Illness     SSS / PAR AFIB / PERM PACER:  She is active for her age and is most limited by balance problems and uses a cane. Has no palpitation, light-headedness or unusual sob. Is compliant with meds and has no known bleeding problems with the DOAC. Pacer interrogation today shows good function and no arrhythmias. Ap/ = 97/25. Today's EKG is nsc x for a single non-v conducted paced atrial beat.    IHD:  Is as active as she can or cares to be and has no cp or unusual sob and no EKG changes.    HTN:  Gets good readings at clinic checks and no light-headedness.    HLD:  Is on two statins (an unusual regimen)  and lipids are checked by her pcp. LDL=73 on 12/19.    LVDD;  Is on potent meds including HCTZ + Lasix. She has no edema or unusual sob and kidney function is ok for her age.       The following portions of the patient's history were reviewed and updated as appropriate: allergies, current medications, past family history, past medical history, past social history, past surgical history and problem list.    Patient Active Problem List   Diagnosis   • Paroxysmal atrial fibrillation (CMS/HCC)   • SSS (sick sinus syndrome) (CMS/HCC)   • Coronary artery disease   • Hypertension   • Lower leg edema   • Chronic anticoagulation   • Hx of right coronary artery stent placement   • Pacemaker   • Mixed hyperlipidemia   • Class 1 obesity due to excess calories with serious comorbidity and body mass index (BMI) of 32.0 to 32.9 in adult   • Chronic diastolic congestive heart failure (CMS/HCC)   • Prolonged Q-T interval on ECG       No Known Allergies    Family History   Problem Relation Age of Onset   • Cancer Mother    • Tuberculosis Father    • Coronary artery disease Brother    • Heart disease Sister    • Heart disease Sister        Social History     Socioeconomic History   • Marital status:      Spouse name: Not on  file   • Number of children: Not on file   • Years of education: Not on file   • Highest education level: Not on file   Tobacco Use   • Smoking status: Never Smoker   • Smokeless tobacco: Never Used   Substance and Sexual Activity   • Alcohol use: No   • Drug use: No   • Sexual activity: Defer         Current Outpatient Medications:   •  carvedilol (COREG) 25 MG tablet, Take 25 mg by mouth 2 (two) times a day with meals., Disp: , Rfl:   •  dronedarone (MULTAQ) 400 MG tablet, Take 400 mg by mouth 2 (two) times a day with meals., Disp: , Rfl:   •  furosemide (LASIX) 40 MG tablet, Take 40 mg by mouth 2 (two) times a day., Disp: , Rfl:   •  levothyroxine (SYNTHROID, LEVOTHROID) 50 MCG tablet, Take 50 mcg by mouth daily., Disp: , Rfl:   •  olmesartan-hydrochlorothiazide (BENICAR HCT) 40-12.5 MG per tablet, Take 1 tablet by mouth daily., Disp: , Rfl:   •  potassium chloride (K-DUR,KLOR-CON) 20 MEQ CR tablet, Take 20 mEq by mouth 2 (two) times a day., Disp: , Rfl:   •  pravastatin (PRAVACHOL) 40 MG tablet, Take 40 mg by mouth Daily., Disp: , Rfl:   •  ranolazine (RANEXA) 500 MG 12 hr tablet, Take 500 mg by mouth 2 (two) times a day., Disp: , Rfl:   •  rivaroxaban (XARELTO) 15 MG tablet, Take 15 mg by mouth daily., Disp: , Rfl:   •  simvastatin (ZOCOR) 40 MG tablet, Take 40 mg by mouth every night., Disp: , Rfl:   •  vitamin D (ERGOCALCIFEROL) 54419 units capsule capsule, Take 50,000 Units by mouth 1 (One) Time Per Week., Disp: , Rfl:   •  aspirin 81 MG EC tablet, Take 81 mg by mouth daily., Disp: , Rfl:     Past Surgical History:   Procedure Laterality Date   • APPENDECTOMY     • CAROTID STENT     • CHOLECYSTECTOMY     • EYE SURGERY  11/25/2014    Cataract Extraction   • HYSTERECTOMY     • INSERT / REPLACE / REMOVE PACEMAKER     • TOTAL LAPAROSCOPIC HYSTERECTOMY WITH MID-URETHRAL SLING AND CYSTOSCOPY         Review of Systems   Constitutional: Negative for fatigue, fever and unexpected weight change.   Respiratory:  "Negative for apnea, chest tightness and shortness of breath.    Cardiovascular: Negative for chest pain, palpitations and leg swelling.   Gastrointestinal: Negative for abdominal pain and blood in stool.   Genitourinary: Negative for dysuria and hematuria.   Musculoskeletal: Negative for myalgias.   Neurological: Negative for weakness and light-headedness.   Psychiatric/Behavioral: Negative for sleep disturbance.       /60   Pulse 56   Ht 157.5 cm (62\")   Wt 74.8 kg (165 lb)   BMI 30.18 kg/m²   Procedures    Objective   Physical Exam   Constitutional: She is oriented to person, place, and time. She appears well-developed and well-nourished. No distress.   elderly   HENT:   Head: Normocephalic.   Eyes: Pupils are equal, round, and reactive to light.   Neck: No thyromegaly present.   Cardiovascular: Normal rate, regular rhythm, normal heart sounds and intact distal pulses. Exam reveals no gallop and no friction rub.   No murmur heard.  Pulmonary/Chest: Effort normal and breath sounds normal. No stridor. No respiratory distress. She has no wheezes. She has no rales.   Abdominal: Soft. Bowel sounds are normal. She exhibits no distension and no mass. There is no tenderness. There is no guarding.   Musculoskeletal: She exhibits no edema, tenderness or deformity.   Neurological: She is alert and oriented to person, place, and time.   Skin: Skin is warm and dry. She is not diaphoretic.   Psychiatric: She has a normal mood and affect.       Assessment/Plan   Juarez was seen today for atrial fibrillation, coronary artery disease and hypertension.    Diagnoses and all orders for this visit:    Paroxysmal atrial fibrillation (CMS/[facility])  Comments:  maintainng ap or nsr   Orders:  -     ECG 12 Lead    SSS (sick sinus syndrome) (CMS/[facility])  Comments:  now asymptomatic    Coronary artery disease involving native coronary artery of native heart without angina pectoris  Comments:  no angina    Essential " hypertension  Comments:  adequate control    Pacemaker  Comments:  good function    Chronic diastolic congestive heart failure (CMS/HCC)  Comments:  compensated                 Return in about 6 months (around 1/21/2021) for Next scheduled follow up with apc.  Orders Placed This Encounter   Procedures   • ECG 12 Lead     Order Specific Question:   Reason for Exam:     Answer:   [email or web address]

## 2020-08-13 PROBLEM — I49.5 SSS (SICK SINUS SYNDROME) (HCC): Status: ACTIVE | Noted: 2020-01-01

## 2020-08-13 NOTE — PROGRESS NOTES
Chief Complaint:   Lala Escalona is a 80 y.o. female who presents forcomplete physical exam.    History of Present Illness:      Patient is here for  [address]:  Dr. Harmon [facility] ophthalmology  Dr. Shanelle Lucero cardiology    PT NOT ABLE TO [facility] PATIENT SON- NO ISSUES   NO COMPLAINTS ABOUT HEARING DEFICIT  NO BEHAVIORAL OR PSYCHOSOCIAL RISKS DETECTED  DEPRESSION SCREEN REVIEWED    NO COGNITIVE DEFICIT DETECTED    _____________________________________________________________________    Pt presents today for follow-up and management of following chronic medicalconditions:    Type 2 diabetes mellitus with diabetic polyneuropathy, without long-term current use of insulin ([facility] [address]-  2019 had lost weight, this year has gained few pounds back  Per son overall she has good appetite  She has not been checking her blood sugars at home since recent A1c readings over last few years have been in good range    HYPOthyroidism- has been taking synthroid daily ( 75 micrograms)     Hypertension- blood pressure at home has been in good range and she has been taking all her blood pressure medications as prescribed- benicar 40 daily     Chronic diastolic congestive heart failure (HCC)  Permanent atrial fibrillation  - stable/ mild leg swelling and some days; has been compliant with her medications/ uses FOREST hoses  She takes daily xarelto     Pure hypercholesterolemia- Patient  has tried to follow diet recommendations.  Has been taking  cholesterol lowering medication pravastatin 40      ROSALES on CPAP= has been using her CPAP     Patient Active Problem List    Diagnosis Date Noted    SSS (sick sinus syndrome) ([facility] [address] 08/13/2020    Stage 3 chronic kidney disease ([facility] [address] 12/06/2018    Vitamin D deficiency 08/06/2018    Type 2 diabetes mellitus with diabetic polyneuropathy, without long-term current use of insulin ([facility] [address] 10/21/2017    Acquired hypothyroidism 10/21/2017    Mild mitral regurgitation 10/21/2017    Exogenous obesity 10/21/2017    Chronic diastolic congestive heart failure ([facility] [address] 10/21/2017     Per echo 2016      Mild aortic regurgitation 10/21/2017    Obesity (BMI 30-39.9) 08/29/2017    Blister (nonthermal), left lower leg, initial encounter 07/25/2017    Coronary artery disease due to calcified coronary lesion 07/25/2017     Post previous stents( dr Anson Ojeda)      ROSALES on CPAP      AHI:  15.3      CPAP (continuous positive airway pressure) dependence      9cm      Left carotid bruit     Essential hypertension     Permanent atrial fibrillation     Pure hypercholesterolemia        Past Medical History:   Diagnosis Date    A-fib ([facility] [address]     CPAP (continuous positive airway pressure) dependence     9cm    DM (diabetes mellitus screen)     Hyperlipidemia     Hypertension     Hyperthyroidism     Left carotid bruit     Macular degeneration     Obstructive sleep apnea     AHI:  15.3       Past Surgical History:   Procedure Laterality Date    APPENDECTOMY      BLADDER SURGERY      CHOLECYSTECTOMY      PACEMAKER PLACEMENT      PAT AND BSO         Current Outpatient Medications   Medication Sig Dispense Refill    vitamin D (ERGOCALCIFEROL) 1.25 MG (19862 UT) CAPS capsule TAKE ONE CAPSULE BY MOUTH EVERY OTHER SUNDAY 12 capsule 1    potassium chloride (KLOR-CON M) 20 MEQ extended release tablet TAKE ONE TABLET BY MOUTH ONCE DAILY 90 tablet 3    rivaroxaban (XARELTO) 15 MG TABS tablet TAKE ONE TABLET BY MOUTH EVERY DAY 90 tablet 1    pravastatin (PRAVACHOL) 40 MG tablet TAKE ONE TABLET BY MOUTH EVERY DAY IN THE EVENING 90 tablet 1    olmesartan (BENICAR) 40 MG tablet TAKE ONE TABLET BY MOUTH EVERY DAY 90 tablet 1    blood glucose test strips (FREESTYLE LITE) strip USE ONE STRIP ONCE DAILY 50 strip 11    furosemide (LASIX) 40 MG tablet TAKE ONE TABLET BY MOUTH EVERY DAY AND EXTRA TABLET TWICE WEEKLY AS NEEDED 135 tablet 1    dronedarone hcl (MULTAQ) 400 MG TABS TAKE ONE TABLET BY MOUTH TWICE A DAY WITH MORNING AND EVENING MEAL 180 tablet 1    carvedilol (COREG) 25 MG tablet TAKE ONE TABLET BY MOUTH TWICE A DAY WITH MEALS 180 tablet 1    amLODIPine (NORVASC) 10 MG tablet TAKE ONE TABLET BY MOUTH EVERY DAY 90 tablet 1    levothyroxine (SYNTHROID) 75 MCG tablet Take 1 tablet by mouth daily 30 tablet 5    acetaminophen (AMINOFEN) 325 MG tablet Take 2 tablets by mouth every 4 hours as needed for Pain 120 tablet 3    ranolazine (RANEXA) 500 MG extended release tablet TAKE ONE TABLET BY MOUTH TWICE A  tablet 1     No current facility-administered medications for this visit. No Known Allergies    Social History     Socioeconomic History    Marital status:      Spouse name: None    Number of children: None    Years of education: None    Highest education level: None   Occupational History    None   Social Needs    Financial resource strain: None    Food insecurity     Worry: None     Inability: None    Transportation needs     Medical: None     Non-medical: None   Tobacco Use    Smoking status: Never Smoker    Smokeless tobacco: Never Used   Substance and Sexual Activity    Alcohol use: No    Drug use: None    Sexual activity: None   Lifestyle    Physical activity     Days per week: None     Minutes per session: None    Stress: None   Relationships    Social connections     Talks on phone: None     Gets together: None     Attends Christian service: None     Active member of club or organization: None     Attends meetings of clubs or organizations: None     Relationship status: None    Intimate partner violence     Fear of current or ex partner: None     Emotionally abused: None     Physically abused: None     Forced sexual activity: None   Other Topics Concern    None   Social History Narrative    None     No family history on file.     Past Surgical History:   Procedure Laterality Date    APPENDECTOMY      BLADDER SURGERY      CHOLECYSTECTOMY      PACEMAKER PLACEMENT      PAT AND BSO           Lab Review   Orders Only on 08/07/2020   Component Date Value    Microalbumin, Random Uri* 08/07/2020 <1.20     Creatinine, Ur 08/07/2020 33.9     Microalbumin Creatinine * 08/07/2020 see below     WBC 08/07/2020 7.5     RBC 08/07/2020 4.04*    Hemoglobin 08/07/2020 11.2*    Hematocrit 08/07/2020 35.7*    MCV 08/07/2020 88.4     MCH 08/07/2020 27.7     MCHC 08/07/2020 31.4*    RDW 08/07/2020 14.7*    Platelets 07/62/9623 211     MPV 08/07/2020 9.7     Neutrophils % 08/07/2020 73.6*    Lymphocytes % 08/07/2020 16.9*    Monocytes % 08/07/2020 7.1     Eosinophils % 08/07/2020 1.6     Basophils % 08/07/2020 0.4     Neutrophils Absolute 08/07/2020 5.5     Immature Granulocytes # 08/07/2020 0.0     Lymphocytes Absolute 08/07/2020 1.3     Monocytes Absolute 08/07/2020 0.50     Eosinophils Absolute 08/07/2020 0.10     Basophils Absolute 08/07/2020 0.00     Vit D, 25-Hydroxy 08/07/2020 62.0     T4 Free 08/07/2020 1.42     TSH 08/07/2020 2.930     Color, UA 08/07/2020 YELLOW     Clarity, UA 08/07/2020 CLOUDY*    Glucose, Ur 08/07/2020 Negative     Bilirubin Urine 08/07/2020 Negative     Ketones, Urine 08/07/2020 Negative     Specific Gravity, UA 08/07/2020 1.009     Blood, Urine 08/07/2020 Negative     pH, UA 08/07/2020 6.0     Protein, UA 08/07/2020 Negative     Urobilinogen, Urine 08/07/2020 0.2     Nitrite, Urine 08/07/2020 Negative     Leukocyte Esterase, Urine 08/07/2020 SMALL*    Cholesterol, Total 08/07/2020 144*    Triglycerides 08/07/2020 65     HDL 08/07/2020 70     LDL Calculated 08/07/2020 61     Hemoglobin A1C 08/07/2020 6.2*    Sodium 08/07/2020 144     Potassium 08/07/2020 4.3     Chloride 08/07/2020 105     CO2 08/07/2020 21*    Anion Gap 08/07/2020 18     Glucose 08/07/2020 131*    BUN 08/07/2020 26*    CREATININE 08/07/2020 1.5*    GFR Non- 08/07/2020 32*  GFR  08/07/2020 39*    Calcium 08/07/2020 9.1     Total Protein 08/07/2020 6.7     Alb 08/07/2020 4.0     Total Bilirubin 08/07/2020 0.4     Alkaline Phosphatase 08/07/2020 65     ALT 08/07/2020 11     AST 08/07/2020 14     Bacteria, UA 08/07/2020 4+*    Crystals, UA 08/07/2020 NEG*    Hyaline Casts, UA 08/07/2020 3     WBC, UA 08/07/2020 9*    RBC, UA 08/07/2020 13*    Epithelial Cells, UA 08/07/2020 2          Review of Systems   Constitutional: Positive for fatigue. Negative for chills and fever. HENT: Positive for hearing loss. Negative for congestion, ear pain, nosebleeds, postnasal drip and sore throat. Respiratory: Positive for shortness of breath. Negative for cough, chest tightness and wheezing. Cardiovascular: Positive for leg swelling. Negative for chest pain and palpitations. Gastrointestinal: Negative for abdominal pain and constipation. Genitourinary: Negative for dysuria and urgency. Musculoskeletal: Negative. Negative for arthralgias. Skin: Negative for rash. Neurological: Negative for dizziness and headaches. Psychiatric/Behavioral: Negative. Vitals:    08/13/20 1206   BP: 120/62   Site: Left Upper Arm   Position: Sitting   Cuff Size: Large Adult   Pulse: 61   Resp: 18   SpO2: 99%   Weight: 165 lb (74.8 kg)   Height: 5' 2\" (1.575 m)      Wt Readings from Last 3 Encounters:   08/13/20 165 lb (74.8 kg)   04/13/20 155 lb (70.3 kg)   12/09/19 163 lb (73.9 kg)   Body mass index is 30.18 kg/m². BP Readings from Last 3 Encounters:   08/13/20 120/62   04/13/20 120/66   12/09/19 120/66       Physical Exam  Constitutional:       General: She is not in acute distress. Appearance: She is well-developed. She is not diaphoretic. HENT:      Head: Normocephalic and atraumatic. Nose: Nose normal.   Eyes:      General: No scleral icterus. Right eye: No discharge. Left eye: No discharge.       Conjunctiva/sclera: Conjunctivae normal.      Pupils: Pupils are equal, round, and reactive to light. Neck:      Musculoskeletal: Normal range of motion. Thyroid: No thyromegaly. Vascular: No JVD. Cardiovascular:      Rate and Rhythm: Normal rate and regular rhythm. Heart sounds: Murmur present. Pulmonary:      Breath sounds: Rales present. No wheezing. Chest:      Chest wall: No tenderness. Abdominal:      General: Bowel sounds are normal. There is no distension. Tenderness: There is no guarding. Musculoskeletal:      Right lower leg: Edema present. Left lower leg: Edema present. Comments: Bilateral 1+ ankle and pedal edema is present, patient wearing FOREST hoses   Lymphadenopathy:      Cervical: No cervical adenopathy. Skin:     General: Skin is dry. Findings: No erythema or rash. Neurological:      Mental Status: She is oriented to person, place, and time. Cranial Nerves: No cranial nerve deficit. Coordination: Coordination normal.      Deep Tendon Reflexes: Reflexes are normal and symmetric. Psychiatric:         Behavior: Behavior normal.         Thought Content: Thought content normal.         Judgment: Judgment normal.           ASSESSMENT/PLAN  MEDICARE WELLNESS SCREENING/ MAINTENANCE:  1:MAMMOGRAM na  PAP na  BONE DENSITY na  2. SCREENING CSCOPE na  3. CARDIOVASCULAR SCREENING- LIPID PANEL DONE  4. DIABETES SCREEN DONE - FBS =ABOVE LABS  5. PNEUMONIA VACCINATION has completed series  6. INFLUENZA VACCINATION: TO BE DONE IN FALL- pt will return  7. HEP B VACCINATION- na  8. HIV/ HEP SCREENING na        HEALTH PLAN:  1. HEALTHY DIET: diabetic diet  2. EXERCISE slow walks with walker + assistance recommended  3. FALL RISK SCREEN REVIEWED;FALL RISK dw pt + son  Vision Screening:  No exam data present    Fall Risk:  Timed Up and Go Test > 12 seconds?  (Complete if either Fall Risk answers are Yes): no  2 or more falls in past year?: no  Fall with injury in past year?: no    Depression:  PHQ-2 Score: 0    Cognitive:  Clock Drawing Test (CDT) Score: Normal    ______________________________________________________________________    Management plan for chronic medical conditions:    Type 2 diabetes mellitus with diabetic polyneuropathy, without long-term current use of insulin (HCC)=  her A1c is stable 6.2 ( 6.2) ( 5.9) (6.4)( 5.7) ( 6.0) ( 5.8), patient will continue strict diet control, no added sugar diet  Discussed the patient that she needs to try to avoid further weight loss     Acquired hypothyroidism-   Recommendations today  Cont synthroid 75 mcg daily  Plan repeat in dec 2020        Chronic diastolic congestive heart failure (HCC)  SSS post pacemaker  Hypertension  BP stable  No meds changes this time  Blood pressure has been in good range, patient will continue current blood pressure medication- benicar 40 + amlodipine 10 daily        CKD stage 3  Her GFR is declined 32 (38 )(35)(32)(32 )(38 )( 32), asked patient to increase her water intake, at this time continue same Lasix dose, monitor renal function closely  pth  73 ( 71)   Phosphorus nl previous lab  Ca normal  Bicarb nl     Pure hypercholesterolemia-   her LDL level is well, 61 (73 )(79)( 82) (65) triglycerides are good,   cont  pravastatin 40      ROSALES on CPAP- cont current cpap settings     Vit d deficiency  Level low is good 62 ( 51) ( 61) ( 63)(22) (24)  stop 50,000 every other week  Take 2000 daily    Mild chronic disease anemia with hemoglobin 11.2-previously hemoglobin 12-12.2 range  monitor  Obtain B12 and ferritin level with next lab testing in 12/2020  Orders Placed This Encounter   Procedures    Comprehensive Metabolic Panel    TSH without Reflex    T4, Free    Vitamin D 25 Hydroxy    Phosphorus    CBC Auto Differential    Vitamin B12    Ferritin    PTH, Intact     New Prescriptions    No medications on file        Return in about 4 months (around 12/13/2020) for Medication check.    There are no

## 2020-12-11 NOTE — TELEPHONE ENCOUNTER
To make sure that she maintains good fluid/water intake  Take Imodium as needed  If problem continues would need to be seen Monday  Lab results that were done yesterday are in good range

## 2020-12-11 NOTE — TELEPHONE ENCOUNTER
Started 3-4 days ago she says her stomach is full she eats a little and is full then will have diarrhea   no fever no chills

## 2020-12-11 NOTE — TELEPHONE ENCOUNTER
Pt has diarrhea she used imodium and it stopped and then she ate soup yesterday and now has it again she also has not appetite she can eat a little and feels full

## 2020-12-11 NOTE — TELEPHONE ENCOUNTER
Need additional information  1. Date of onset of symptoms  2. Any weight changes  3.   Any additional symptoms like fever chills diarrhea

## 2020-12-15 NOTE — PROGRESS NOTES
Chief Complaint   Patient presents with    Follow-up     4 month     History of presenting illness:  Clemente Carbone is a80 y.o. female who presents today for follow up on her chronic medical conditions as noted below. Type 2 diabetes mellitus with diabetic polyneuropathy, without long-term current use of insulin ([facility]-  2019 had lost weight, this year has gained few pounds back  Per son overall she has good appetite  She has not been checking her blood sugars at home since recent A1c readings over last few years have been in good range     HYPOthyroidism- has been taking synthroid daily ( 75 micrograms)     Hypertension- blood pressure at home has been in good range and she has been taking all her blood pressure medications as prescribed- benicar 40 daily     Chronic diastolic congestive heart failure (HCC)  Permanent atrial fibrillation  - stable/ mild leg swelling and some days; has been compliant with her medications/ uses [employer] hoses  She takes daily xarelto     Pure hypercholesterolemia- Patient  has tried to follow diet recommendations.  Has been taking  cholesterol lowering medication pravastatin 40      ROSALES on CPAP= has been using her CPAP    Diarrhea on going since 12/9/20  Taking immodium         Patient Active Problem List    Diagnosis Date Noted    SSS (sick sinus syndrome) ([facility] 08/13/2020    Stage 3 chronic kidney disease ([facility] [facility] 12/06/2018    Vitamin D deficiency 08/06/2018    Type 2 diabetes mellitus with diabetic polyneuropathy, without long-term current use of insulin ([facility] [address] [facility] 10/21/2017    Acquired hypothyroidism 10/21/2017    Mild mitral regurgitation 10/21/2017    Exogenous obesity 10/21/2017    Chronic diastolic congestive heart failure ([facility] 10/21/2017     Overview Note:     Per echo 2016      Mild aortic regurgitation 10/21/2017    Obesity (BMI 30-39.9) 08/29/2017    Blister (nonthermal), left lower leg, initial encounter 07/25/2017  Coronary artery disease due to calcified coronary lesion 07/25/2017     Overview Note:     Post previous stents( dr Eli Houston)      ROSALES on CPAP      Overview Note:     AHI:  15.3      CPAP (continuous positive airway pressure) dependence      Overview Note:     9cm      Left carotid bruit     Essential hypertension     Permanent atrial fibrillation (HCC)     Pure hypercholesterolemia      Past Medical History:   Diagnosis Date    A-fib (HCC)     CPAP (continuous positive airway pressure) dependence     9cm    DM (diabetes mellitus screen)     Hyperlipidemia     Hypertension     Hyperthyroidism     Left carotid bruit     Macular degeneration     Obstructive sleep apnea     AHI:  15.3      Past Surgical History:   Procedure Laterality Date    APPENDECTOMY      BLADDER SURGERY      CHOLECYSTECTOMY      PACEMAKER PLACEMENT      PAT AND BSO       Current Outpatient Medications   Medication Sig Dispense Refill    Cholecalciferol (VITAMIN D3) 50 MCG (2000 UT) CAPS Take by mouth      diphenoxylate-atropine (DIPHENATOL) 2.5-0.025 MG per tablet Take 1 tablet by mouth 2 times daily as needed for Diarrhea for up to 10 days.  20 tablet 0    potassium chloride (KLOR-CON M) 20 MEQ extended release tablet TAKE ONE TABLET BY MOUTH ONCE DAILY 90 tablet 3    rivaroxaban (XARELTO) 15 MG TABS tablet TAKE ONE TABLET BY MOUTH EVERY DAY 90 tablet 1    ranolazine (RANEXA) 500 MG extended release tablet TAKE ONE TABLET BY MOUTH TWICE A  tablet 1    pravastatin (PRAVACHOL) 40 MG tablet TAKE ONE TABLET BY MOUTH EVERY DAY IN THE EVENING 90 tablet 1    olmesartan (BENICAR) 40 MG tablet TAKE ONE TABLET BY MOUTH EVERY DAY 90 tablet 1    blood glucose test strips (FREESTYLE LITE) strip USE ONE STRIP ONCE DAILY 50 strip 11    furosemide (LASIX) 40 MG tablet TAKE ONE TABLET BY MOUTH EVERY DAY AND EXTRA TABLET TWICE WEEKLY AS NEEDED 135 tablet 1  dronedarone hcl (MULTAQ) 400 MG TABS TAKE ONE TABLET BY MOUTH TWICE A DAY WITH MORNING AND EVENING MEAL 180 tablet 1    carvedilol (COREG) 25 MG tablet TAKE ONE TABLET BY MOUTH TWICE A DAY WITH MEALS 180 tablet 1    amLODIPine (NORVASC) 10 MG tablet TAKE ONE TABLET BY MOUTH EVERY DAY 90 tablet 1    levothyroxine (SYNTHROID) 75 MCG tablet Take 1 tablet by mouth daily 30 tablet 5    acetaminophen (AMINOFEN) 325 MG tablet Take 2 tablets by mouth every 4 hours as needed for Pain 120 tablet 3     No current facility-administered medications for this visit. No Known Allergies  Social History     Tobacco Use    Smoking status: Never Smoker    Smokeless tobacco: Never Used   Substance Use Topics    Alcohol use: No      No family history on file. Review of Systems   Constitutional: Positive for fatigue. Negative for chills and fever. HENT: Negative for congestion, ear pain, nosebleeds, postnasal drip and sore throat. Respiratory: Negative for cough, chest tightness and wheezing. Cardiovascular: Negative for chest pain, palpitations and leg swelling. Gastrointestinal: Positive for diarrhea and nausea. Negative for abdominal pain and constipation. Genitourinary: Negative for dysuria and urgency. Musculoskeletal: Negative. Negative for arthralgias. Skin: Negative for rash. Neurological: Negative for dizziness and headaches. Psychiatric/Behavioral: Negative. Vitals:    12/15/20 1253   BP: 128/60   Site: Left Upper Arm   Position: Sitting   Cuff Size: Large Adult   Pulse: 62   Resp: 18   SpO2: 97%   Weight: 150 lb (68 kg)   Height: 5' 2\" (1.575 m)     Body mass index is 27.44 kg/m². Physical Exam  Constitutional:       Appearance: She is well-developed. HENT:      Right Ear: External ear normal.      Left Ear: External ear normal.      Mouth/Throat:      Pharynx: No oropharyngeal exudate.    Eyes:      Conjunctiva/sclera: Conjunctivae normal. Pupils: Pupils are equal, round, and reactive to light. Neck:      Musculoskeletal: Neck supple. Thyroid: No thyromegaly. Vascular: No JVD. Cardiovascular:      Rate and Rhythm: Normal rate. Heart sounds: Murmur present. Pulmonary:      Effort: No respiratory distress. Breath sounds: Normal breath sounds. No wheezing or rales. Chest:      Chest wall: No tenderness. Abdominal:      General: Bowel sounds are normal.      Palpations: Abdomen is soft. Lymphadenopathy:      Cervical: No cervical adenopathy. Skin:     General: Skin is warm. Findings: No rash. Neurological:      Mental Status: She is oriented to person, place, and time.          Lab Review   Orders Only on 12/10/2020   Component Date Value    PTH 12/10/2020 67.9*    Ferritin 12/10/2020 54.8     Vitamin B-12 12/10/2020 587     WBC 12/10/2020 7.8     RBC 12/10/2020 4.27     Hemoglobin 12/10/2020 12.4     Hematocrit 12/10/2020 40.3     MCV 12/10/2020 94.4     MCH 12/10/2020 29.0     MCHC 12/10/2020 30.8*    RDW 12/10/2020 15.1*    Platelets 89/14/8891 229     MPV 12/10/2020 9.8     Neutrophils % 12/10/2020 72.7*    Lymphocytes % 12/10/2020 15.6*    Monocytes % 12/10/2020 6.4     Eosinophils % 12/10/2020 4.0     Basophils % 12/10/2020 0.5     Neutrophils Absolute 12/10/2020 5.6     Immature Granulocytes # 12/10/2020 0.1     Lymphocytes Absolute 12/10/2020 1.2     Monocytes Absolute 12/10/2020 0.50     Eosinophils Absolute 12/10/2020 0.30     Basophils Absolute 12/10/2020 0.00     Phosphorus 12/10/2020 3.7     Vit D, 25-Hydroxy 12/10/2020 61.3     T4 Free 12/10/2020 1.56     TSH 12/10/2020 1.220     Sodium 12/10/2020 139     Potassium 12/10/2020 4.0     Chloride 12/10/2020 104     CO2 12/10/2020 23     Anion Gap 12/10/2020 12     Glucose 12/10/2020 101     BUN 12/10/2020 21     CREATININE 12/10/2020 1.4*    GFR Non- 12/10/2020 35*  GFR  12/10/2020 42*    Calcium 12/10/2020 8.9     Total Protein 12/10/2020 6.9     Alb 12/10/2020 3.9     Total Bilirubin 12/10/2020 0.5     Alkaline Phosphatase 12/10/2020 100     ALT 12/10/2020 49*    AST 12/10/2020 40*           ASSESSMENT/PLAN:    Type 2 diabetes mellitus with diabetic polyneuropathy, without long-term current use of insulin (HCC)=  her A1c is stable 6.2 ( 6.2) ( 5.9) (6.4)( 5.7) ( 6.0) ( 5.8), patient will continue strict diet control, no added sugar diet     Acquired hypothyroidism-   Recommendations today  RX synthroid 75 mcg daily    Chronic diastolic congestive heart failure (HCC)  post pacemaker  Hypertension  BP stable  No meds changes this time  Blood pressure has been in good range, patient will continue current blood pressure medication- benicar 40 + amlodipine 10 daily        CKD stage 3  Her GFR is declined 35 ( 32 (38 )(35)(32)(32 )(38 )( 32), asked patient to increase her water intake, at this time continue same Lasix dose, monitor renal function closely  pth  73 ( 71)   Phosphorus nl previous lab  Ca normal  Bicarb nl     Pure hypercholesterolemia-   her LDL level is well, 61 (73 )(79)( 82) (65) triglycerides are good,   RX pravastatin 40      ROSALES on CPAP- cont current cpap settings     Vit d deficiency  Level 57 ( 61 (62 ( 51) ( 61) ( 63)(22) (24)  RX  stop 50,000 every other week  Take 2000 daily     Mild chronic disease anemia with hemoglobin now better 12.4 in 12/2020 (11.2(( 12.2)  B12 and ferritin are normal range      ALT 49  AST 40  She has recently had GI symptoms with diarrhea and nausea that now are improving/resolving  monitor    Diarrheal illness since 12/9  * obtain diatherix stool sample  * lomotil PRN- rx to pt      Orders Placed This Encounter   Procedures    Hemoglobin A1C    Comprehensive Metabolic Panel    Lipid Panel    Vitamin D 25 Hydroxy     New Prescriptions DIPHENOXYLATE-ATROPINE (DIPHENATOL) 2.5-0.025 MG PER TABLET    Take 1 tablet by mouth 2 times daily as needed for Diarrhea for up to 10 days. Return in about 3 months (around 3/15/2021) for Medication check. There are no Patient Instructions on file for this visit. EMR Dragon/transcription disclaimer:Significant part of this  encounter note is electronic transcription/translationof spoken language to printed text. The electronic translation of spoken language may be erroneous, or at times, nonsensical words or phrases may be inadvertently transcribed.  Although I have reviewed the note for sucherrors, some may still exist.

## 2020-12-23 NOTE — ED PROVIDER NOTES
[facility] EMERGENCY DEPT  eMERGENCY dEPARTMENT eNCOUnter      Pt Name: Jose David Black  MRN: [id number]  [place] 5/3/1925  Date of evaluation: 12/23/2020  Provider: Adi Ortega MD    [address] [place] [address]       Chief Complaint   Patient presents with    Diarrhea     x 1 week         HISTORY OF PRESENT ILLNESS   (Location/Symptom, Timing/Onset,Context/Setting, Quality, Duration, Modifying Factors, Severity)  Note limiting factors. Jose David Black is a 80 y.o. female who presents to the emergency department with diarrhea and early satiety. She said she has copious watery diarrhea daily for the last 2 weeks. She saw her primary care doctor and was placed on medication. Had no improvement. She denies any fever. She said she is eating plenty but gets full very easily. No blood in her stool. No recent antibiotics. HPI    NursingNotes were reviewed. REVIEW OF SYSTEMS    (2-9 systems for level 4, 10 or more for level 5)     Review of Systems   Constitutional: Negative for chills and fever. HENT: Negative for rhinorrhea and sore throat. Respiratory: Negative for shortness of breath. Cardiovascular: Negative for chest pain and leg swelling. Gastrointestinal: Positive for diarrhea. Negative for abdominal pain, nausea and vomiting. Genitourinary: Negative for difficulty urinating. Musculoskeletal: Negative for back pain and neck pain. Skin: Negative for rash. Neurological: Positive for weakness. Negative for headaches. Psychiatric/Behavioral: Negative for confusion. A complete review of systems was performed and is negative except as noted above in the HPI.        PAST MEDICAL HISTORY     Past Medical History:   Diagnosis Date    A-fib ([facility] [address]     CPAP (continuous positive airway pressure) dependence     9cm    DM (diabetes mellitus screen)     Hyperlipidemia     Hypertension     Hyperthyroidism     Left carotid bruit     Macular degeneration     Obstructive sleep apnea     AHI:  15.3 SURGICAL HISTORY       Past Surgical History:   Procedure Laterality Date    APPENDECTOMY      BLADDER SURGERY      CHOLECYSTECTOMY      PACEMAKER PLACEMENT      PAT AND BSO           CURRENT MEDICATIONS       Previous Medications    ACETAMINOPHEN (AMINOFEN) 325 MG TABLET    Take 2 tablets by mouth every 4 hours as needed for Pain    AMLODIPINE (NORVASC) 10 MG TABLET    TAKE ONE TABLET BY MOUTH EVERY DAY    AMLODIPINE (NORVASC) 10 MG TABLET    TAKE ONE TABLET BY MOUTH EVERY DAY    BLOOD GLUCOSE TEST STRIPS (FREESTYLE LITE) STRIP    USE ONE STRIP ONCE DAILY    CARVEDILOL (COREG) 25 MG TABLET    TAKE ONE TABLET BY MOUTH TWICE A DAY WITH MEALS    CARVEDILOL (COREG) 25 MG TABLET    TAKE ONE TABLET BY MOUTH TWICE A DAY WITH MEALS    CHOLECALCIFEROL (VITAMIN D3) 50 MCG (2000 UT) CAPS    Take by mouth    DIPHENOXYLATE-ATROPINE (DIPHENATOL) 2.5-0.025 MG PER TABLET    Take 1 tablet by mouth 2 times daily as needed for Diarrhea for up to 10 days.     DRONEDARONE HCL (MULTAQ) 400 MG TABS    TAKE ONE TABLET BY MOUTH TWICE A DAY WITH MORNING AND EVENING MEAL    DRONEDARONE HCL (MULTAQ) 400 MG TABS    TAKE ONE TABLET BY MOUTH TWICE A DAY WITH MORNING AND EVENING MEAL    FUROSEMIDE (LASIX) 40 MG TABLET    TAKE ONE TABLET BY MOUTH EVERY DAY AND EXTRA TABLET TWICE WEEKLY AS NEEDED    FUROSEMIDE (LASIX) 40 MG TABLET    TAKE ONE TABLET BY MOUTH EVERY DAY AND EXTRA TABLET TWICE WEEKLY AS NEEDED    LEVOTHYROXINE (SYNTHROID) 75 MCG TABLET    TAKE ONE TABLET BY MOUTH EVERY DAY    LEVOTHYROXINE (SYNTHROID) 75 MCG TABLET    Take 1 tablet by mouth daily    OLMESARTAN (BENICAR) 40 MG TABLET    TAKE ONE TABLET BY MOUTH EVERY DAY    OLMESARTAN (BENICAR) 40 MG TABLET    TAKE ONE TABLET BY MOUTH EVERY DAY    POTASSIUM CHLORIDE (KLOR-CON M) 20 MEQ EXTENDED RELEASE TABLET    TAKE ONE TABLET BY MOUTH ONCE DAILY    PRAVASTATIN (PRAVACHOL) 40 MG TABLET    TAKE ONE TABLET BY MOUTH EVERY EVENING PRAVASTATIN (PRAVACHOL) 40 MG TABLET    TAKE ONE TABLET BY MOUTH EVERY DAY IN THE EVENING    RANOLAZINE (RANEXA) 500 MG EXTENDED RELEASE TABLET    TAKE ONE TABLET BY MOUTH TWICE A DAY    RANOLAZINE (RANEXA) 500 MG EXTENDED RELEASE TABLET    TAKE ONE TABLET BY MOUTH TWICE A DAY    RIVAROXABAN (XARELTO) 15 MG TABS TABLET    TAKE ONE TABLET BY MOUTH EVERY DAY    RIVAROXABAN (XARELTO) 15 MG TABS TABLET    TAKE ONE TABLET BY MOUTH EVERY DAY       ALLERGIES     Patient has no known allergies. FAMILY HISTORY     No family history on file. SOCIAL HISTORY       Social History     Socioeconomic History    Marital status:       Spouse name: Not on file    Number of children: Not on file    Years of education: Not on file    Highest education level: Not on file   Occupational History    Not on file   Social Needs    Financial resource strain: Patient refused    Food insecurity     Worry: Patient refused     Inability: Patient refused    Transportation needs     Medical: Patient refused     Non-medical: Patient refused   Tobacco Use    Smoking status: Never Smoker    Smokeless tobacco: Never Used   Substance and Sexual Activity    Alcohol use: No    Drug use: Not on file    Sexual activity: Not on file   Lifestyle    Physical activity     Days per week: Not on file     Minutes per session: Not on file    Stress: Not on file   Relationships    Social connections     Talks on phone: Not on file     Gets together: Not on file     Attends Christian service: Not on file     Active member of club or organization: Not on file     Attends meetings of clubs or organizations: Not on file     Relationship status: Not on file    Intimate partner violence     Fear of current or ex partner: Not on file     Emotionally abused: Not on file     Physically abused: Not on file     Forced sexual activity: Not on file   Other Topics Concern    Not on file   Social History Narrative    Not on file       SCREENINGS PHYSICAL EXAM    (up to 7 for level 4, 8 or more for level 5)     ED Triage Vitals [12/23/20 1145]   BP Temp Temp Source Pulse Resp SpO2 Height Weight   (!) 150/55 97.9 °F (36.6 °C) Tympanic 61 21 97 % 5' 2\" (1.575 m) 147 lb (66.7 kg)       Physical Exam  Vitals signs and nursing note reviewed. Constitutional:       General: She is not in acute distress. Appearance: She is well-developed. She is not diaphoretic. HENT:      Head: Normocephalic and atraumatic. Eyes:      Pupils: Pupils are equal, round, and reactive to light. Neck:      Musculoskeletal: Normal range of motion and neck supple. Cardiovascular:      Rate and Rhythm: Normal rate and regular rhythm. Heart sounds: Normal heart sounds. Pulmonary:      Effort: Pulmonary effort is normal. No respiratory distress. Breath sounds: Normal breath sounds. Abdominal:      General: Bowel sounds are normal. There is no distension. Palpations: Abdomen is soft. Tenderness: There is no abdominal tenderness. Comments: Increased bowel sounds   Musculoskeletal: Normal range of motion. Skin:     General: Skin is warm and dry. Findings: No rash. Neurological:      Mental Status: She is alert and oriented to person, place, and time. Cranial Nerves: No cranial nerve deficit. Motor: No abnormal muscle tone.       Coordination: Coordination normal.   Psychiatric:         Behavior: Behavior normal.         DIAGNOSTIC RESULTS     EKG: All EKG's are interpreted by the Emergency Department Physician who either signs or Co-signs this chart in the absence of a cardiologist.        RADIOLOGY:   Non-plain film images such as CT, Ultrasound and MRI are read by the radiologist. Plainradiographic images are visualized and preliminarily interpreted by the emergency physician with the below findings:        Interpretation per the Radiologist below, if available at the time of this note: CT ABDOMEN PELVIS WO CONTRAST Additional Contrast? None    (Results Pending)         ED BEDSIDE ULTRASOUND:   Performed by ED Physician - none    LABS:  Labs Reviewed   CBC WITH AUTO DIFFERENTIAL - Abnormal; Notable for the following components:       Result Value    MCHC 32.5 (*)     RDW 15.8 (*)     MPV 9.1 (*)     Neutrophils % 68.2 (*)     Lymphocytes % 17.4 (*)     Eosinophils % 5.4 (*)     All other components within normal limits   COMPREHENSIVE METABOLIC PANEL - Abnormal; Notable for the following components:    Potassium 3.4 (*)     CO2 16 (*)     Glucose 117 (*)     CREATININE 1.5 (*)     GFR Non- 32 (*)     GFR  39 (*)     All other components within normal limits   C DIFF TOXIN/ANTIGEN    Narrative:     ORDER#: [id number]                          ORDERED BY: BENITO MANN  SOURCE: Stool Stool                        COLLECTED:  12/23/20 12:26  ANTIBIOTICS AT [facility].:                      RECEIVED :  12/23/20 12:28  Collect in clean container   CULTURE, STOOL   LIPASE   COVID-19   URINE RT REFLEX TO CULTURE   MISCELLANEOUS SENDOUT 1       All other labs were within normal range or not returned as of this dictation. EMERGENCY DEPARTMENT COURSE and DIFFERENTIALDIAGNOSIS/MDM:   Vitals:    Vitals:    12/23/20 1145   BP: (!) 150/55   Pulse: 61   Resp: 21   Temp: 97.9 °F (36.6 °C)   TempSrc: Tympanic   SpO2: 97%   Weight: 147 lb (66.7 kg)   Height: 5' 2\" (1.575 m)       MDM   JORGE end of shift pending CT. Dr villegas to FU on CT. CONSULTS:  None    PROCEDURES:  Unless otherwise notedbelow, none     Procedures    FINAL IMPRESSION     1.  Diarrhea, unspecified type          DISPOSITION/PLAN   DISPOSITION        PATIENT REFERRED TO:  @FUP@    DISCHARGE MEDICATIONS:  New Prescriptions    No medications on file (Please note that portions of this note were completed with a voice recognition program.  Efforts were made to edit the dictations butoccasionally words are mis-transcribed.)    Hira Andino MD (electronically signed)  AttendingEmergency Physician         Hira Andino MD  12/23/20 5898

## 2020-12-23 NOTE — ED PROVIDER NOTES
[facility] EMERGENCY [facility]  eMERGENCY dEPARTMENT eNCOUnter      Pt Name: Ayesha Holcomb  MRN: [id number]  [place] 5/3/1925  Date of evaluation: 12/23/2020  Provider: Camacho Mercer MD    CHIEF COMPLAINT       Chief Complaint   Patient presents with    Diarrhea     x 1 week     Signed by Dr. Diana Sandoval. CT scan pending. PHYSICAL EXAM    (up to 7 for level 4, 8 or more for level 5)     ED Triage Vitals [12/23/20 1145]   BP Temp Temp Source Pulse Resp SpO2 Height Weight   (!) 150/55 97.9 °F (36.6 °C) Tympanic 61 21 97 % 5' 2\" (1.575 m) 147 lb (66.7 kg)       Physical Exam  Vitals signs and nursing note reviewed. Constitutional:       Appearance: Normal appearance. Comments: Well-appearing 80year-old up walking around to the bathroom without difficulty looks much younger than stated age   Cardiovascular:      Rate and Rhythm: Normal rate. Pulses: Normal pulses. Abdominal:      General: Abdomen is flat. Palpations: Abdomen is soft. Tenderness: There is no abdominal tenderness. Neurological:      General: No focal deficit present. Mental Status: She is alert and oriented to person, place, and time. DIAGNOSTIC RESULTS     EKG: All EKG's are interpreted by the Emergency Department Physician who either signs or Co-signs this chart in the absence of a cardiologist.        RADIOLOGY:   Non-plain film images such as CT, Ultrasound and MRI are read by the radiologist. Rana Iha radiographicimages are visualized and preliminarily interpreted by the emergency physician with the below findings:        CT ABDOMEN PELVIS WO CONTRAST Additional Contrast? None   Final Result   Nondistended fluid-filled small bowel loops and moderately   dilated fluid filled large bowel loops may represent an acute   enterocolitis, ileus or diarrhea status. No obstruction. The diverticulosis of the distal colon. No evidence for   diverticulitis. A low-density nodule in the head of the pancreas which may represent a   cyst, pseudocyst or a cystic neoplasm. Further evaluation with   contrast enhanced CT scan of the abdomen with pancreatic protocol may   be obtained. A 3 mm nonobstructing calculus lower pole of the right kidney. Signed by Dr Anushka Novoa on 12/23/2020 3:08 PM              LABS:  Labs Reviewed   CBC WITH AUTO DIFFERENTIAL - Abnormal; Notable for the following components:       Result Value    MCHC 32.5 (*)     RDW 15.8 (*)     MPV 9.1 (*)     Neutrophils % 68.2 (*)     Lymphocytes % 17.4 (*)     Eosinophils % 5.4 (*)     All other components within normal limits   COMPREHENSIVE METABOLIC PANEL - Abnormal; Notable for the following components:    Potassium 3.4 (*)     CO2 16 (*)     Glucose 117 (*)     CREATININE 1.5 (*)     GFR Non- 32 (*)     GFR  39 (*)     All other components within normal limits   C DIFF TOXIN/ANTIGEN    Narrative:     ORDER#: [id number]                          ORDERED BY: BENITO MANN  SOURCE: Stool Stool                        COLLECTED:  12/23/20 12:26  ANTIBIOTICS AT [facility].:                      RECEIVED :  12/23/20 12:28  Collect in clean container   GASTROINTESTINAL PANEL, MOLECULAR    Narrative:     ORDER WAS CANCELLED 12/23/2020 14:45, GIPCR requested. CULTURE, STOOL   LIPASE   COVID-19   URINE RT REFLEX TO CULTURE   TSH WITH REFLEX TO FT4       All other labs were within normal range or not returned as of this dictation.     EMERGENCY DEPARTMENT COURSE and DIFFERENTIALDIAGNOSIS/MDM:   Vitals:    Vitals:    12/23/20 1145 12/23/20 1526   BP: (!) 150/55 132/62   Pulse: 61 66   Resp: 21 16   Temp: 97.9 °F (36.6 °C)    TempSrc: Tympanic    SpO2: 97% 97%   Weight: 147 lb (66.7 kg)    Height: 5' 2\" (1.575 m)        MDM  Number of Diagnoses or Management Options  Diarrhea, unspecified type

## 2020-12-29 PROBLEM — K86.2 PANCREATIC CYST: Status: ACTIVE | Noted: 2020-01-01

## 2020-12-29 PROBLEM — R19.7 DIARRHEA: Status: ACTIVE | Noted: 2020-01-01

## 2020-12-29 NOTE — PROGRESS NOTES
Chief Complaint   Patient presents with    Follow-up     History of presenting illness:  Paula Francis is a80 y.o. female who presents today for follow up on her chronic medical conditions as noted below. Patient presents here for follow-up post ER visit  Initially complains of diarrhea that started around December 15  [employer] stool studies were done which were negative  Patient seen at the emergency room on 12/23/2020   Emergency room testing was done 12/23  Gastrointestinal panel negative  C. difficile negative  Stool culture negative  CBC normal  CMP showing declined GFR of 32 and potassium 3.4 (baseline GFR 32-34)/liver function test normal  Lipase normal at 48    CT abd + pelvis:  Impression   Nondistended fluid-filled small bowel loops and moderately   dilated fluid filled large bowel loops may represent an acute   enterocolitis, ileus or diarrhea status. No obstruction. The diverticulosis of the distal colon. No evidence for   diverticulitis. A low-density nodule in the head of the pancreas which may represent a   cyst, pseudocyst or a cystic neoplasm. Further evaluation with   contrast enhanced CT scan of the abdomen with pancreatic protocol may   be obtained. A 3 mm nonobstructing calculus lower pole of the right kidney.    Signed by Dr Ren Tenorio on 12/23/2020 3:08 PM         Increased weakness since last week er visit  Lost 10 lbs in 2 weeks  Has significant difficulty ambulating secondary to weakness  Has diarrhea 6-7 times per day  immodium does not help at all  Minimal p.o. intake, patient gets extreme fullness and nausea with any fluid or food intake    Patient Active Problem List    Diagnosis Date Noted    SSS (sick sinus syndrome) ([facility] 08/13/2020    Stage 3 chronic kidney disease ([facility] 12/06/2018    Vitamin D deficiency 08/06/2018    Type 2 diabetes mellitus with diabetic polyneuropathy, without long-term current use of insulin ([facility] 10/21/2017  Acquired hypothyroidism 10/21/2017    Mild mitral regurgitation 10/21/2017    Exogenous obesity 10/21/2017    Chronic diastolic congestive heart failure ([facility] 10/21/2017     Overview Note:     Per echo 2016      Mild aortic regurgitation 10/21/2017    Obesity (BMI 30-39.9) 08/29/2017    Blister (nonthermal), left lower leg, initial encounter 07/25/2017    Coronary artery disease due to calcified coronary lesion 07/25/2017     Overview Note:     Post previous stents( dr Kale Loyola)      ROSALES on CPAP      Overview Note:     AHI:  15.3      CPAP (continuous positive airway pressure) dependence      Overview Note:     9cm      Left carotid bruit     Essential hypertension     Permanent atrial fibrillation (HCC)     Pure hypercholesterolemia      Past Medical History:   Diagnosis Date    A-fib ([facility]     CPAP (continuous positive airway pressure) dependence     9cm    DM (diabetes mellitus screen)     Hyperlipidemia     Hypertension     Hyperthyroidism     Left carotid bruit     Macular degeneration     Obstructive sleep apnea     AHI:  15.3      Past Surgical History:   Procedure Laterality Date    APPENDECTOMY      BLADDER SURGERY      CHOLECYSTECTOMY      PACEMAKER PLACEMENT      PAT AND BSO       Current Outpatient Medications   Medication Sig Dispense Refill    dronedarone hcl (MULTAQ) 400 MG TABS TAKE ONE TABLET BY MOUTH TWICE A DAY WITH MORNING AND EVENING MEAL 180 tablet 1    carvedilol (COREG) 25 MG tablet TAKE ONE TABLET BY MOUTH TWICE A DAY WITH MEALS 180 tablet 1    levothyroxine (SYNTHROID) 75 MCG tablet TAKE ONE TABLET BY MOUTH EVERY DAY 30 tablet 5    amLODIPine (NORVASC) 10 MG tablet TAKE ONE TABLET BY MOUTH EVERY DAY 90 tablet 1    pravastatin (PRAVACHOL) 40 MG tablet TAKE ONE TABLET BY MOUTH EVERY EVENING 90 tablet 1    olmesartan (BENICAR) 40 MG tablet TAKE ONE TABLET BY MOUTH EVERY DAY 90 tablet 1  furosemide (LASIX) 40 MG tablet TAKE ONE TABLET BY MOUTH EVERY DAY AND EXTRA TABLET TWICE WEEKLY AS NEEDED 135 tablet 1    rivaroxaban (XARELTO) 15 MG TABS tablet TAKE ONE TABLET BY MOUTH EVERY DAY 90 tablet 1    ranolazine (RANEXA) 500 MG extended release tablet TAKE ONE TABLET BY MOUTH TWICE A  tablet 1    potassium chloride (KLOR-CON M) 20 MEQ extended release tablet TAKE ONE TABLET BY MOUTH ONCE DAILY 90 tablet 3    blood glucose test strips (FREESTYLE LITE) strip USE ONE STRIP ONCE DAILY 50 strip 11    Cholecalciferol (VITAMIN D3) 50 MCG (2000 UT) CAPS Take by mouth      acetaminophen (AMINOFEN) 325 MG tablet Take 2 tablets by mouth every 4 hours as needed for Pain 120 tablet 3     No current facility-administered medications for this visit. No Known Allergies  Social History     Tobacco Use    Smoking status: Never Smoker    Smokeless tobacco: Never Used   Substance Use Topics    Alcohol use: No      No family history on file. Review of Systems   Constitutional: Positive for fatigue. Negative for chills and fever. HENT: Negative for congestion, ear pain, nosebleeds, postnasal drip and sore throat. Respiratory: Negative for cough, chest tightness and wheezing. Cardiovascular: Negative for chest pain, palpitations and leg swelling. Gastrointestinal: Positive for diarrhea. Negative for abdominal pain and constipation. Genitourinary: Negative for dysuria and urgency. Musculoskeletal: Negative. Negative for arthralgias. Skin: Negative for rash. Neurological: Positive for dizziness and weakness. Negative for headaches. Psychiatric/Behavioral: Negative. Vitals:    12/29/20 1316   BP: (!) 128/50   Site: Left Upper Arm   Position: Sitting   Cuff Size: Large Adult   Pulse: 76   Resp: 18   SpO2: 98%   Weight: 144 lb (65.3 kg)     Body mass index is 26.34 kg/m². Physical Exam  Constitutional:       Appearance: She is well-developed.    HENT: Right Ear: External ear normal.      Left Ear: External ear normal.      Mouth/Throat:      Pharynx: No oropharyngeal exudate. Eyes:      Conjunctiva/sclera: Conjunctivae normal.      Pupils: Pupils are equal, round, and reactive to light. Neck:      Musculoskeletal: Neck supple. Thyroid: No thyromegaly. Vascular: No JVD. Cardiovascular:      Rate and Rhythm: Normal rate. Rhythm irregular. Heart sounds: Murmur present. Pulmonary:      Effort: No respiratory distress. Breath sounds: Rales present. No wheezing. Chest:      Chest wall: No tenderness. Abdominal:      General: Bowel sounds are normal.      Palpations: Abdomen is soft. Lymphadenopathy:      Cervical: No cervical adenopathy. Skin:     General: Skin is warm. Findings: No rash. Neurological:      Mental Status: She is oriented to person, place, and time.          Lab Review   Admission on 12/23/2020, Discharged on 12/23/2020   Component Date Value    WBC 12/23/2020 6.3     RBC 12/23/2020 4.36     Hemoglobin 12/23/2020 12.7     Hematocrit 12/23/2020 39.1     MCV 12/23/2020 89.7     MCH 12/23/2020 29.1     MCHC 12/23/2020 32.5*    RDW 12/23/2020 15.8*    Platelets 18/72/7939 188     MPV 12/23/2020 9.1*    Neutrophils % 12/23/2020 68.2*    Lymphocytes % 12/23/2020 17.4*    Monocytes % 12/23/2020 7.4     Eosinophils % 12/23/2020 5.4*    Basophils % 12/23/2020 0.5     Neutrophils Absolute 12/23/2020 4.3     Immature Granulocytes # 12/23/2020 0.1     Lymphocytes Absolute 12/23/2020 1.1     Monocytes Absolute 12/23/2020 0.50     Eosinophils Absolute 12/23/2020 0.30     Basophils Absolute 12/23/2020 0.00     Sodium 12/23/2020 141     Potassium 12/23/2020 3.4*    Chloride 12/23/2020 110     CO2 12/23/2020 16*    Anion Gap 12/23/2020 15     Glucose 12/23/2020 117*    BUN 12/23/2020 20     CREATININE 12/23/2020 1.5*    GFR Non- 12/23/2020 32*  GFR  12/23/2020 39*    Calcium 12/23/2020 8.6     Total Protein 12/23/2020 7.0     Alb 12/23/2020 3.8     Total Bilirubin 12/23/2020 0.4     Alkaline Phosphatase 12/23/2020 97     ALT 12/23/2020 15     AST 12/23/2020 13     Lipase 12/23/2020 48     Culture, Stool 12/23/2020                      Value:Normal enteric nicolas  No Salmonella, Shigella, or E. coli 0157 isolated      E coli, Shiga toxin Assay 12/23/2020                      Value:Negative  No Shiga toxins 1 and 2 detected  Normal Range:  Not detected      Campylobacter Antigen 12/23/2020                      Value:Campylobacter Antigen EIA not detected  Normal Range: Not detected      C.diff Toxin/Antigen 12/23/2020                      Value:Result: Negative for Toxigenic C. difficile by EIA  Normal Range: Negative      SARS-CoV-2, NAAT 12/23/2020 Not Detected     Adenovirus F 40 41 PCR 12/23/2020 Not Detected     Astrovirus PCR 12/23/2020 Not Detected     Campylobacter PCR 12/23/2020 Not Detected     Clostridium difficile, P* 12/23/2020 Not Detected     Cryptosporidium PCR 12/23/2020 Not Detected     Cyclospora Cayetanensis * 12/23/2020 Not Detected     Entamoeba Histolytica PCR 12/23/2020 Not Detected     E Coli Enteroaggregative* 12/23/2020 Not Detected     E Coli Enteropathogenic * 12/23/2020 Not Detected     E Coli Enterotoxigenic P* 12/23/2020 Not Detected     Giardia Lamblia PCR 12/23/2020 Not Detected     Norovirus GI GII PCR 12/23/2020 Not Detected     Plesiomonas Shigelloides* 12/23/2020 Not Detected     Rotavirus A PCR 12/23/2020 Not Detected     Salmonella PCR 12/23/2020 Not Detected     Sapovirus PCR 12/23/2020 Not Detected     Shiga-like Toxin-produci* 12/23/2020 Not Detected     E Coli Shigella/Enteroin* 12/23/2020 Not Detected     Vibrio PCR 12/23/2020 Not Detected     Vibrio Cholerae PCR 12/23/2020 Not Detected     Yersinia Enterocolitica * 12/23/2020 Not Detected Orders Only on 12/10/2020   Component Date Value    PTH 12/10/2020 67.9*    Ferritin 12/10/2020 54.8     Vitamin B-12 12/10/2020 587     WBC 12/10/2020 7.8     RBC 12/10/2020 4.27     Hemoglobin 12/10/2020 12.4     Hematocrit 12/10/2020 40.3     MCV 12/10/2020 94.4     MCH 12/10/2020 29.0     MCHC 12/10/2020 30.8*    RDW 12/10/2020 15.1*    Platelets 44/24/6906 229     MPV 12/10/2020 9.8     Neutrophils % 12/10/2020 72.7*    Lymphocytes % 12/10/2020 15.6*    Monocytes % 12/10/2020 6.4     Eosinophils % 12/10/2020 4.0     Basophils % 12/10/2020 0.5     Neutrophils Absolute 12/10/2020 5.6     Immature Granulocytes # 12/10/2020 0.1     Lymphocytes Absolute 12/10/2020 1.2     Monocytes Absolute 12/10/2020 0.50     Eosinophils Absolute 12/10/2020 0.30     Basophils Absolute 12/10/2020 0.00     Phosphorus 12/10/2020 3.7     Vit D, 25-Hydroxy 12/10/2020 61.3     T4 Free 12/10/2020 1.56     TSH 12/10/2020 1.220     Sodium 12/10/2020 139     Potassium 12/10/2020 4.0     Chloride 12/10/2020 104     CO2 12/10/2020 23     Anion Gap 12/10/2020 12     Glucose 12/10/2020 101     BUN 12/10/2020 21     CREATININE 12/10/2020 1.4*    GFR Non- 12/10/2020 35*    GFR  12/10/2020 42*    Calcium 12/10/2020 8.9     Total Protein 12/10/2020 6.9     Alb 12/10/2020 3.9     Total Bilirubin 12/10/2020 0.5     Alkaline Phosphatase 12/10/2020 100     ALT 12/10/2020 49*    AST 12/10/2020 40*           ASSESSMENT/PLAN:    Dehydration  Stage 3a chronic kidney disease  Acute on chronic renal insufficiency with declined GFR of 28 today    Diarrhea  Ongoing severe diarrhea, 5-7 times per day.   Stool testing has been negative x2    Weight loss 10 pounds in 2 weeks  Poor p.o. fluid intake  Unable to eat related to postprandial nausea    Hypokalemia 3.2 on a lab today    Extreme weakness related to dehydration/diarrhea/weight loss/advanced age/acute on chronic renal insufficiency Cystic mass of pancreas on ct done last week    CT abd + pelvis:  Impression   Nondistended fluid-filled small bowel loops and moderately   dilated fluid filled large bowel loops may represent an acute   enterocolitis, ileus or diarrhea status. No obstruction. The diverticulosis of the distal colon. No evidence for   diverticulitis. A low-density nodule in the head of the pancreas which may represent a   cyst, pseudocyst or a cystic neoplasm. Further evaluation with   contrast enhanced CT scan of the abdomen with pancreatic protocol may   be obtained. A 3 mm nonobstructing calculus lower pole of the right kidney. Signed by Dr Aline Aguillon on 12/23/2020 3:08 PM     Coexisting problems  Chronic diastolic congestive heart failure ([address]  Permanent atrial fibrillation ([address]    Worsening health condition as described above  We will need observation/IV fluids/additional testing and possible GI consultation  Discussed with hospitalist  Admission to observation  Orders Placed This Encounter   Procedures    Comprehensive Metabolic Panel    CBC Auto Differential    Cancer Antigen 19-9     New Prescriptions    No medications on file         No follow-ups on file. There are no Patient Instructions on file for this visit. EMR Dragon/transcription disclaimer:Significant part of this  encounter note is electronic transcription/translationof spoken language to printed text. The electronic translation of spoken language may be erroneous, or at times, nonsensical words or phrases may be inadvertently transcribed.  Although I have reviewed the note for sucherrors, some may still exist.

## 2020-12-29 NOTE — H&P
Vladimir Bowens - History & Physical    [id number]  PCP: Tammy Story MD  Date of Admission: 12/29/2020   Date of Service: Pt seen/examined on12/29/2020 and Placed in Observation. Chief Complaint: Diarrhea, difficulty eating    History Of Present Illness: The patient is a 80 y.o. female with a past medical history of CKD 3, hypertension, hypothyroidism, diabetes not on insulin, chronic atrial fibrillation on Xarelto, hyperlipidemia, ROSALES on CPAP, HFpEF who went to her PCP today for follow-up visit brought in by family. Patient noted to have continuous diarrhea for the last couple weeks associated with difficulty eating. Diarrhea described as watery nonbloody and she has approximately 5 episodes per day that do not occur at night. Patient denies NSAID usage. No nausea or vomiting but early satiety. Patient has had previous work-ups including CT abdomen/pelvis and infectious work-ups which are grossly unremarkable. Patient was COVID-19 negative as of 12/23/2020. Patient appears comfortable during my evaluation. Patient is hard of hearing. Patient denies chest pain or shortness of breath. Patient was a direct admission from PCP office for worsening renal function and inability to tolerate p.o. intake. Past Medical History:        Diagnosis Date    A-fib ([address]     CPAP (continuous positive airway pressure) dependence     9cm    DM (diabetes mellitus screen)     Hyperlipidemia     Hypertension     Hyperthyroidism     Left carotid bruit     Macular degeneration     Obstructive sleep apnea     AHI:  15.3       Past Surgical History:        Procedure Laterality Date    APPENDECTOMY      BLADDER SURGERY      CHOLECYSTECTOMY      PACEMAKER PLACEMENT      PAT AND BSO         Home Medications:  Prior to Admission medications    Medication Sig Start Date End Date Taking?  Authorizing Provider dronedarone hcl (MULTAQ) 400 MG TABS TAKE ONE TABLET BY MOUTH TWICE A DAY WITH MORNING AND EVENING MEAL 12/22/20   Ninfa Ramírez MD   carvedilol (COREG) 25 MG tablet TAKE ONE TABLET BY MOUTH TWICE A DAY WITH MEALS 12/22/20   Ninfa Ramírez MD   levothyroxine (SYNTHROID) 75 MCG tablet TAKE ONE TABLET BY MOUTH EVERY DAY 12/22/20   Ninfa Ramírez MD   amLODIPine (NORVASC) 10 MG tablet TAKE ONE TABLET BY MOUTH EVERY DAY 12/22/20   Ninfa Ramírez MD   pravastatin (PRAVACHOL) 40 MG tablet TAKE ONE TABLET BY MOUTH EVERY EVENING 12/22/20   Ninfa Ramírez MD   olmesartan (BENICAR) 40 MG tablet TAKE ONE TABLET BY MOUTH EVERY DAY 12/22/20   Ninfa Ramírez MD   furosemide (LASIX) 40 MG tablet TAKE ONE TABLET BY MOUTH EVERY DAY AND EXTRA TABLET TWICE WEEKLY AS NEEDED 12/22/20   Ninfa Ramírez MD   rivaroxaban (XARELTO) 15 MG TABS tablet TAKE ONE TABLET BY MOUTH EVERY DAY 12/22/20   Ninfa Ramírez MD   ranolazine (RANEXA) 500 MG extended release tablet TAKE ONE TABLET BY MOUTH TWICE A DAY 12/22/20   Ninfa Ramírez MD   potassium chloride (KLOR-CON M) 20 MEQ extended release tablet TAKE ONE TABLET BY MOUTH ONCE DAILY 12/22/20   Ninfa Ramírez MD   blood glucose test strips (FREESTYLE LITE) strip USE ONE STRIP ONCE DAILY 12/22/20   Ninfa Ramírez MD   Cholecalciferol (VITAMIN D3) 50 MCG (2000 UT) CAPS Take by mouth    Historical Provider, MD   acetaminophen (AMINOFEN) 325 MG tablet Take 2 tablets by mouth every 4 hours as needed for Pain 6/15/17   Jerrilyn Lefort, APRN - CNP       Allergies:    Patient has no known allergies. Social History:    Tobacco:   reports that she has never smoked. She has never used smokeless tobacco.  Alcohol:   reports no history of alcohol use. Illicit Drugs: denies    Family History:  No family history on file. Review of Systems:   Negative except as above        Physical Examination:  There were no vitals taken for this visit.      General appearance: Alert  Head: NC/AT  Eyes: conjunctivae/corneas clear Ears: normal external ears  Neck: Supple  Lungs: BLAE, no wheezing   Heart: RRR   Abdomen: BS+, soft, NT  Extremities: no edema  Skin: warm  Neurologic: Alert, gross motor function intact  Psychiatric:  Mood appropriate        Diagnostic Data:     CBC:  Recent Labs     12/29/20  1350   WBC 6.2   HGB 12.0   HCT 36.7*        BMP:  Recent Labs     12/29/20  1348      K 3.2*      CO2 17*   BUN 21   CREATININE 1.7*   CALCIUM 8.3     Recent Labs     12/29/20  1348   AST 14   ALT 15   BILITOT 0.5   ALKPHOS 101     Coag Panel: No results for input(s): INR, PROTIME, APTT in the last 72 hours. Cardiac Enzymes: No results for input(s): [employer] in the last 72 hours.   ABGs:No results found for: Shirley Ruffini, [id number]  Urinalysis:  Lab Results   Component Value Date    NITRU Negative 08/07/2020    WBCUA 9 08/07/2020    BACTERIA 4+ 08/07/2020    RBCUA 13 08/07/2020    BLOODU Negative 08/07/2020    SPECGRAV 1.009 08/07/2020    GLUCOSEU Negative 08/07/2020     RAD: Pending    Active Hospital Problems    Diagnosis Date Noted    Diarrhea [R19.7] 12/29/2020    Stage 3 chronic kidney disease [N18.30] 12/06/2018    Acquired hypothyroidism [E03.9] 10/21/2017    [facility] [I50.32] 10/21/2017    Type 2 diabetes mellitus with diabetic polyneuropathy, without long-term current use of insulin ([facility] [E11.42] 10/21/2017    Coronary artery disease due to calcified coronary lesion [I25.10, I25.84] 07/25/2017    CPAP (continuous positive airway pressure) dependence [Z99.89]     ROSALES on CPAP [G47.33, Z99.89]     Essential hypertension [I10]     Permanent atrial fibrillation ([facility] [I48.21]        MPRESSION / PLAN:  Principal Problem:    Diarrhea  Active Problems:    Essential hypertension    Permanent atrial fibrillation (HCC)    ROSALES on CPAP    CPAP (continuous positive airway pressure) dependence    Coronary artery disease due to calcified coronary lesion Type 2 diabetes mellitus with diabetic polyneuropathy, without long-term current use of insulin (HCC)    Acquired hypothyroidism    Chronic diastolic congestive heart failure (HCC)    Stage 3 chronic kidney disease  Resolved Problems:    * No resolved hospital problems. *    Diarrhea/Inability to tolerate PO intake: CT A/P. Antiemetics PRN. IVF. Supportive management. C. Diff antigen. Stool culture. CIELO on CKD III: IVF. US Renal. UA. Monitor BMP. HTN: Monitor BP and adjust medications PRN. DM: HbA1c. ISS. Monitor BG and adjust medications PRN. Afib: RC/AC. ROSALES on CPAP: Continue CPAP at night. HFpEF: Monitor I's/O's. Supportive management. PT/OT/SLP. DVT ppx: Xarelto  Full code.     Sarah Martel MD  12/29/2020

## 2020-12-30 NOTE — PROGRESS NOTES
Speech Language Pathology  Facility/Department: [facility] 5 SURG SERVICES   CLINICAL BEDSIDE SWALLOW EVALUATION    NAME: Crispin Bryant  : 5/3/1925  MRN: [id number]    ADMISSION DATE: 2020  ADMITTING DIAGNOSIS: has Essential hypertension; Permanent atrial fibrillation ([address] Pure hypercholesterolemia; ROSALES on CPAP; CPAP (continuous positive airway pressure) dependence; Left carotid bruit; Blister (nonthermal), left lower leg, initial encounter; Coronary artery disease due to calcified coronary lesion; Obesity (BMI 30-39.9); Type 2 diabetes mellitus with diabetic polyneuropathy, without long-term current use of insulin ([address] Acquired hypothyroidism; Mild mitral regurgitation; Exogenous obesity; Chronic diastolic congestive heart failure ([address] Mild aortic regurgitation; Vitamin D deficiency; Stage 3 chronic kidney disease; SSS (sick sinus syndrome) ([address] Diarrhea; and Pancreatic cyst on their problem list.  ONSET DATE: 20    Recent CT of Chest:   Impression   Impression:   1. No evidence of a right paratracheal mass. 2. Multiple groundglass nodules bilaterally suggest an inflammatory   process. 3. Additional noncalcified pulmonary nodules are present. If patient   is considered high risk, follow-up CT should be considered in 12   months per Fleischner Society guidelines. If the patient is considered   low risk, no follow-up is recommended. 4. Atherosclerosis of the aorta and coronary arteries. 5. Small hiatal hernia. 6. Multiple pancreatic lesions are incompletely evaluated. Consider   follow-up nonemergent MRI abdomen with and without contrast with MRCP. Signed by Dr Izabel Jones on 2020 8:51 AM     Chest Xray  Impression   1.. Focal bulging of the right paratracheal stripe in the superior   mediastinum. Differential considerations are as above but do include   paratracheal adenopathy or mass within the medial right apex. Follow-up with CT imaging of the chest with IV contrast could BE   obtained to better characterize this finding. 2. Lungs are otherwise clear. Signed by Dr Juan Fink on 12/29/2020 5:06 PM       Date of Eval: 12/30/2020  Evaluating Therapist: Steven Hung    Current Diet level:  Current Liquid Diet : Clear      Primary Complaint  Patient Complaint: She is ready to eat. Reason for Referral  Rachael Bales was referred for a bedside swallow evaluation to assess the efficiency of her swallow function, identify signs and symptoms of aspiration and make recommendations regarding safe dietary consistencies, effective compensatory strategies, and safe eating environment. Impression  Dysphagia Diagnosis: Mild oral stage dysphagia;Mild pharyngeal stage dysphagia  Dysphagia Impression : Pt presents with minimal risks for aspiration. Pt has oral residue which she can clear independently with a liquid wash. Pt has minimal decrease in LE that does not appear to affect her pharyngeal stage of swallowing. Pt does have a small hiatal hernia that could put her at risk for multiple swallows with bread textures. Dysphagia Outcome Severity Scale: Level 5: Mild dysphagia- Distant supervision. May need one diet consistency restricted     Treatment Plan  Requires SLP Intervention: Yes  Duration/Frequency of Treatment: 1 week for 3-5x. D/C Recommendations: To be determined       Recommended Diet and Intervention  Diet Solids Recommendation: Dysphagia Soft and Bite-Sized (Dysphagia III)  Liquid Consistency Recommendation: Thin  Recommended Form of Meds: Whole with water     Therapeutic Interventions: Diet tolerance monitoring;Patient/Family education    Compensatory Swallowing Strategies  Compensatory Swallowing Strategies: Check for pocketing of food on the Left; Check for pocketing of food on the Right;Lingual sweep;Small bites/sips; Remain upright for 30-45 minutes after meals;Upright as possible for all oral intake Treatment/Goals  Short-term Goals  Timeframe for Short-term Goals: Safe swallow precautions and aspiration precautions to be followed with all PO intake. Goal 1: Complete diet monitoring with reassessment of swallowing  Long-term Goals  Timeframe for Long-term Goals: 1 week  Goal 1: Pt to maintain adequate hydration and nutrition with PO intake. Goal 2: Pt to tolerate diet and liquid texture without overt s/s of aspiration. General  Chart Reviewed: Yes  Comments: Small hiatal hernia on CT. Subjective  Subjective: Pt is alert and cooperative. Pt stated she was ready to eat and felt better than yesterday this date. Behavior/Cognition: Alert; Cooperative;Pleasant mood  Temperature Spikes Noted: No  Respiratory Status: Room air  Breath Sounds: Clear  O2 Device: None (Room air)  Communication Observation: Functional  Follows Directions: Simple  Dentition: Adequate  Patient Positioning: Upright in bed  Baseline Vocal Quality: Normal  Volitional Cough: Weak  Prior Dysphagia History: Pt reports no hx of swallow difficulty. No MBSS documented. No recent pnuemonia  Consistencies Administered: Reg solid; Dysphagia Soft and Bite-Sized (Dysphagia III); Thin - cup; Thin - straw           Vision/Hearing  Hearing  Hearing: Exceptions to [facility]  Hearing Exceptions: Bilateral hearing aid;Hard of hearing/hearing concerns    Oral Motor Deficits  Oral/Motor  Oral Motor: Within functional limits    Oral Phase Dysfunction  Oral Phase  Oral Phase: Exceptions  Oral Phase Dysfunction  Impaired Mastication: Reg Solid  Decreased Anterior to Posterior Transit: Reg solid  Lingual/Palatal Residue: Reg solid  Oral Phase  Oral Phase - Comment: Pt has functional oral phase for deglution. However, pt was observed with oral stasis and required a liquid wash to clear. Pt verbalized awareness of oral residuals.      Indicators of Pharyngeal Phase Dysfunction   Pharyngeal Phase  Pharyngeal Phase: WFL  Pharyngeal Phase Pharyngeal: Pt had functional timing of swallow response. No overt s/s of aspiration. Minimal decrease in LE. Pt has minimal risks for penetration/aspiration. Prognosis  Prognosis  Prognosis for safe diet advancement: good  Individuals consulted  Consulted and agree with results and recommendations: Patient;RN    Education  Patient Education: Education provided on diet texture and liquid consistency. Patient Education Response: Needs reinforcement  Safety Devices in place: Yes  Type of devices: Nurse notified;Call light within reach; Left in bed                      GINETTE Sagastume  12/30/2020 10:49 AM    Electronically signed by Eve Waddell on 12/30/20 at 10:51 AM CST

## 2020-12-30 NOTE — CONSULTS
Consult Note            Date:12/30/2020        Patient Ulises Gutierrez     YOB: 1925     Age:89 y.o. Inpatient consult to Urology  Consult performed by: Chance Pérez MD  Consult ordered by: Ankur Brower MD  Reason for consult: Still per urethra assess for [place] vesicle fistula          Chief Complaint   No chief complaint on file. Diarrhea    History Obtained From   patient, electronic medical record, hospital caregiver    History of Present Illness   Patient 41-year-old female who has been having diarrhea for the last 2 weeks. She was seen in the emergency department on 12/23/2020 and then also by her primary care doctor yesterday. Apparently work-up to include infectious etiology has been negative. She had a CT scan of the abdomen pelvis done on 12/23/2020. This shows no diverticulitis. No bowel wall thickening no bladder wall thickening. There is no air in the bladder. Patient had repeat CT scan done today which again shows fluid-filled loops of small and large bowel no bowel wall thickening or certainly no pelvic phlegmon or sigmoid colon diverticulitis or inflammation or thickening. There is distinct separation of the colon from the wall the bladder and there is no bladder wall thickening. Patient denies fecal urea or pneumaturia or dysuria. The patient has been having diarrhea since stools multiple times a day. Today the patient was cleaned after soiling by the PCA and was noted that there may have been stool coming out of the urethra. Patient has been incontinent and external incontinence device has been placed. Therefore this generated a consult to urology for the possibility of colovesicle fistula. The patient was not examined by the attending physician before consult was ordered.      Past Medical History     Past Medical History:   Diagnosis Date    A-fib ([address]     CPAP (continuous positive airway pressure) dependence     9cm  DM (diabetes mellitus screen)     Hyperlipidemia     Hypertension     Hyperthyroidism     Left carotid bruit     Macular degeneration     Obstructive sleep apnea     AHI:  15.3        Past Surgical History     Past Surgical History:   Procedure Laterality Date    APPENDECTOMY      BLADDER SURGERY      CHOLECYSTECTOMY      PACEMAKER PLACEMENT      PAT AND BSO          Medications     Prior to Admission medications    Medication Sig Start Date End Date Taking?  Authorizing Provider   dronedarone hcl (MULTAQ) 400 MG TABS TAKE ONE TABLET BY MOUTH TWICE A DAY WITH MORNING AND EVENING MEAL 12/22/20   Michael Ferrari MD   carvedilol (COREG) 25 MG tablet TAKE ONE TABLET BY MOUTH TWICE A DAY WITH MEALS 12/22/20   Michael Ferrari MD   levothyroxine (SYNTHROID) 75 MCG tablet TAKE ONE TABLET BY MOUTH EVERY DAY 12/22/20   Michael Ferrari MD   amLODIPine (NORVASC) 10 MG tablet TAKE ONE TABLET BY MOUTH EVERY DAY 12/22/20   Michael Ferrari MD   pravastatin (PRAVACHOL) 40 MG tablet TAKE ONE TABLET BY MOUTH EVERY EVENING 12/22/20   Michael Ferrari MD   olmesartan (BENICAR) 40 MG tablet TAKE ONE TABLET BY MOUTH EVERY DAY 12/22/20   Michael Ferrari MD   furosemide (LASIX) 40 MG tablet TAKE ONE TABLET BY MOUTH EVERY DAY AND EXTRA TABLET TWICE WEEKLY AS NEEDED 12/22/20   Michael Ferrari MD   rivaroxaban (XARELTO) 15 MG TABS tablet TAKE ONE TABLET BY MOUTH EVERY DAY 12/22/20   Michael Ferrari MD   ranolazine (RANEXA) 500 MG extended release tablet TAKE ONE TABLET BY MOUTH TWICE A DAY 12/22/20   Michael Ferrari MD   potassium chloride (KLOR-CON M) 20 MEQ extended release tablet TAKE ONE TABLET BY MOUTH ONCE DAILY 12/22/20   Michael Ferrari MD   blood glucose test strips (FREESTYLE LITE) strip USE ONE STRIP ONCE DAILY 12/22/20   Michael Ferrari MD   Cholecalciferol (VITAMIN D3) 50 MCG (2000 UT) CAPS Take by mouth    Historical Provider, MD acetaminophen (AMINOFEN) 325 MG tablet Take 2 tablets by mouth every 4 hours as needed for Pain 6/15/17   Lexii Vaughan, APRN - JIGNESH            magnesium sulfate 4 g in 100 mL IVPB premix, Once      potassium chloride (KLOR-CON M) extended release tablet 40 mEq, BID      [START ON 12/31/2020] ciprofloxacin (CIPRO) IVPB 400 mg, Q24H      sodium bicarbonate 150 mEq in dextrose 5 % 1,000 mL infusion, Continuous      amLODIPine (NORVASC) tablet 10 mg, Daily      carvedilol (COREG) tablet 25 mg, BID WC      dronedarone hcl (MULTAQ) tablet 400 mg, BID WC      levothyroxine (SYNTHROID) tablet 75 mcg, Daily      pravastatin (PRAVACHOL) tablet 40 mg, QPM      ranolazine (RANEXA) extended release tablet 500 mg, BID      rivaroxaban (XARELTO) tablet 15 mg, Daily      sodium chloride flush 0.9 % injection 10 mL, 2 times per day      sodium chloride flush 0.9 % injection 10 mL, PRN      polyethylene glycol (GLYCOLAX) packet 17 g, Daily PRN      acetaminophen (TYLENOL) tablet 650 mg, Q6H PRN    Or      acetaminophen (TYLENOL) suppository 650 mg, Q6H PRN      potassium chloride (KLOR-CON M) extended release tablet 40 mEq, PRN    Or      potassium bicarb-citric acid (EFFER-K) effervescent tablet 40 mEq, PRN    Or      potassium chloride 10 mEq/100 mL IVPB (Peripheral Line), PRN      magnesium sulfate 2 g in 50 mL IVPB premix, PRN      insulin lispro (HUMALOG) injection vial 0-6 Units, TID WC      insulin lispro (HUMALOG) injection vial 0-3 Units, Nightly      glucose (GLUTOSE) 40 % oral gel 15 g, PRN      dextrose 50 % IV solution, PRN      glucagon (rDNA) injection 1 mg, PRN      dextrose 5 % solution, PRN      pantoprazole (PROTONIX) tablet 40 mg, QAM AC      promethazine (PHENERGAN) injection 25 mg, Q6H PRN      metroNIDAZOLE (FLAGYL) tablet 500 mg, 3 times per day        Allergies   Patient has no known allergies.     Social History     Social History     Tobacco History     Smoking Status Never Smoker    Smokeless Tobacco Use  Never Used          Alcohol History     Alcohol Use Status  No          Drug Use     Drug Use Status  Not Asked          Sexual Activity     Sexually Active  Not Asked                Family History   No family history on file. Review of Systems   Review of Systems   Constitutional: Positive for fatigue and unexpected weight change. Negative for fever. HENT:        Hearing loss   Eyes: Negative. Respiratory: Negative. Cardiovascular: Negative. Gastrointestinal: Positive for diarrhea. Negative for abdominal pain. Genitourinary: Negative for difficulty urinating, dysuria and flank pain. All other systems reviewed and are negative. Physical Exam   BP (!) 95/57   Pulse 60   Temp 97.7 °F (36.5 °C) (Temporal)   Resp 14   SpO2 96%      Physical Exam  Constitutional:       Appearance: Normal appearance. HENT:      Head: Normocephalic and atraumatic. Nose: Nose normal.      Mouth/Throat:      Mouth: Mucous membranes are moist.   Eyes:      General: No scleral icterus. Conjunctiva/sclera: Conjunctivae normal.      Pupils: Pupils are equal, round, and reactive to light. Cardiovascular:      Rate and Rhythm: Normal rate and regular rhythm. Pulmonary:      Effort: Pulmonary effort is normal. No respiratory distress. Abdominal:      General: There is no distension. Palpations: There is no mass. Tenderness: There is no abdominal tenderness. Comments: Laparotomy scar   Genitourinary:     General: Normal vulva. Comments: There is clear urine from the urethral meatus. There is no stool coming from the urethra. Urethra is pelvic normal without mass. Age associated mucosal atrophy. No prolapse  Neurological:      Mental Status: She is alert.          Labs    CBC:  Recent Labs     12/29/20  1350 12/30/20  0452   WBC 6.2 6.0   RBC 4.18* 3.52*   HGB 12.0 10.4*   HCT 36.7* 30.4*   MCV 87.8 86.4   RDW 16.3* 16.0*    161 CHEMISTRIES:  Recent Labs     12/29/20  1348 12/30/20  0452 12/30/20  0947 12/30/20  1453    140  --  139   K 3.2* 2.4* 2.5* 2.5*    103  --  104   CO2 17* 23  --  24   BUN 21 18  --  16   CREATININE 1.7* 1.5*  --  1.3*   GLUCOSE 118* 124*  --  156*   MG  --  1.3*  --  3.5*     PT/INR:No results for input(s): PROTIME, INR in the last 72 hours. APTT:No results for input(s): APTT in the last 72 hours. LIVER PROFILE:  Recent Labs     12/29/20  1348 12/30/20  0452   AST 14 12   ALT 15 13   BILITOT 0.5 0.5   ALKPHOS 101 86       Imaging/Diagnostics   Ct Abdomen Pelvis Wo Contrast Additional Contrast? None    Result Date: 12/29/2020  1. There is fluid contained throughout the nondilated small and large bowel loops which may be seen with an infectious/inflammatory enterocolitis. No prominent abnormal bowel wall thickening. No evidence of bowel obstruction. No free air or abscess. 2. Multiple scattered cystic lesions of the pancreas may represent cysts, intraductal papillary mucinous neoplasms, or cystic distention pancreatic duct. Finding is not acute and there is no peripancreatic inflammation. Comparison outside films would be helpful to assess chronicity of the finding. If no outside studies, follow-up nonemergent outpatient CT or MR imaging with and without IV contrast utilizing pancreatic protocol would be best for further evaluation. If Patient unable to tolerate IV contrast, short-term follow-up CT exam in 3 months recommended to reassess. 3. Probable subcentimeter lateral left hepatic cyst. 4. Prior cholecystectomy, hysterectomy, and appendectomy. 3. Cardiac pacer device. Mild cardiomegaly with trace pericardial fluid. 5. Diffuse vascular calcification with no regional aneurysm.  Signed by Dr Griselda Coward on 12/29/2020 5:04 PM    Ct Chest Wo Contrast    Result Date: 12/30/2020 CPAP (continuous positive airway pressure) dependence 12/29/2020 Yes    Overview Signed 7/5/2016  8:22 AM by ODETTE Schaeffer     9cm         Coronary artery disease due to calcified coronary lesion (Chronic) 12/29/2020 Yes    Overview Signed 10/21/2017  9:41 PM by Mai Silva MD     Post previous stents( dr Madeleine Taveras)         Type 2 diabetes mellitus with diabetic polyneuropathy, without long-term current use of insulin ([facility] [address] 12/29/2020 Yes    Acquired hypothyroidism 12/29/2020 Yes    Chronic diastolic congestive heart failure ([address] 12/29/2020 Yes    Overview Signed 10/21/2017  9:46 PM by Mai Silva MD     Per echo 2016         Stage 3 chronic kidney disease 12/29/2020 Yes    Pancreatic cyst 12/29/2020 Yes          Plan   1. Fecal soiling of perineum and  genitalia from patient having diarrhea. I see no signs , symptoms or radiographic findings to suggest a colovesical fistula. I would do a sterile in and out catheterization for urine culture and treat appropriately as indicated based on results and sensitivities.   Will sign off    Electronically signed by Jo Ann Alex MD on 12/30/20 at 4:45 PM CST

## 2020-12-30 NOTE — PROGRESS NOTES
Pharmacy Renal Adjustment    Pro Adhikari is a 80 y.o. female. Pharmacy has renally adjusted medications per protocol.     Recent Labs     12/29/20  1348 12/30/20  0452   BUN 21 18       Recent Labs     12/29/20  1348 12/30/20  0452   CREATININE 1.7* 1.5*       CrCl Calculates 19.9 ml/min using Cockcroft & Gault     Height:   Ht Readings from Last 1 Encounters:   12/23/20 5' 2\" (1.575 m)     Weight:  Wt Readings from Last 1 Encounters:   12/29/20 144 lb (65.3 kg)       Plan: Adjust the following medications based on renal function:           Change Cipro IV to 400 mg every 24 hours     Electronically signed by GIA Danielson [facility] on 12/30/2020 at 11:38 AM

## 2020-12-30 NOTE — PROGRESS NOTES
[facility]        Hospitalist Progress Note  12/30/2020 11:50 AM  Subjective:   Admit Date: 12/29/2020  PCP: Ivanna Robert MD    Chief Complaint: Direct admission for diarrhea    Subjective: Patient seen and examined at bedside. Still complaining of diarrhea and it seems to be worse. Nursing staff noted patient with fecal material coming from urethra. Denies chest pain or shortness of breath. Cumulative Hospital History: 80 y.o. female with a past medical history of CKD 3, hypertension, hypothyroidism, diabetes not on insulin, chronic atrial fibrillation on Xarelto, hyperlipidemia, ROSALES on CPAP, HFpEF who went to her PCP today for follow-up visit brought in by family. CT abdomen/pelvis showing possible enterocolitis and patient started on empiric antibiotics. 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 GI consulted for chronic diarrhea. Stool culture negative to date. C. difficile antigen unremarkable. Patient was noted to have pulmonary nodules and pancreatic cysts which will require outpatient follow-up imaging. ROS: 14 point review of systems is negative except as specifically addressed above. DIET DYSPHAGIA SOFT AND BITE-SIZED;     Intake/Output Summary (Last 24 hours) at 12/30/2020 1150  Last data filed at 12/30/2020 0800  Gross per 24 hour   Intake 120 ml   Output    Net 120 ml     Medications:   sodium bicarbonate infusion 100 mL/hr at 12/30/20 1115    dextrose       Current Facility-Administered Medications   Medication Dose Route Frequency Provider Last Rate Last Admin    magnesium sulfate 4 g in 100 mL IVPB premix  4 g Intravenous Once Sasha Tang MD        potassium chloride (KLOR-CON M) extended release tablet 40 mEq  40 mEq Oral BID Sasha Tang MD        [START ON 12/31/2020] ciprofloxacin (CIPRO) IVPB 400 mg  400 mg Intravenous Q24H Sasha Tang MD  sodium bicarbonate 150 mEq in dextrose 5 % 1,000 mL infusion   Intravenous Continuous Taylor Hart  mL/hr at 12/30/20 1115 New Bag at 12/30/20 1115    amLODIPine (NORVASC) tablet 10 mg  10 mg Oral Daily Taylor Hart MD   10 mg at 12/30/20 0843    carvedilol (COREG) tablet 25 mg  25 mg Oral BID  Taylor Hart MD   25 mg at 12/30/20 0844    dronedarone hcl (MULTAQ) tablet 400 mg  400 mg Oral BID  Taylor Hart MD   400 mg at 12/30/20 6258    levothyroxine (SYNTHROID) tablet 75 mcg  75 mcg Oral Daily Taylor Hart MD   75 mcg at 12/30/20 0844    pravastatin (PRAVACHOL) tablet 40 mg  40 mg Oral QPM Taylor Hart MD   40 mg at 12/29/20 1804    ranolazine (RANEXA) extended release tablet 500 mg  500 mg Oral BID Taylor Hart MD   500 mg at 12/30/20 8589    rivaroxaban (XARELTO) tablet 15 mg  15 mg Oral Daily Taylor Hart MD        sodium chloride flush 0.9 % injection 10 mL  10 mL Intravenous 2 times per day Taylor Hart MD   10 mL at 12/30/20 0845    sodium chloride flush 0.9 % injection 10 mL  10 mL Intravenous PRN Taylor Hart MD        polyethylene glycol [facility]) packet 17 g  17 g Oral Daily PRN Taylor Hart MD        acetaminophen (TYLENOL) tablet 650 mg  650 mg Oral Q6H PRN Taylor Hart MD        Or    acetaminophen (TYLENOL) suppository 650 mg  650 mg Rectal Q6H PRN Taylor Hart MD        potassium chloride (KLOR-CON M) extended release tablet 40 mEq  40 mEq Oral PRN Taylor Hart MD   40 mEq at 12/30/20 9628    Or    potassium bicarb-citric acid (EFFER-K) effervescent tablet 40 mEq  40 mEq Oral PRN Taylor Hart MD        Or    potassium chloride 10 mEq/100 mL IVPB (Peripheral Line)  10 mEq Intravenous PRN Taylor Hart  mL/hr at 12/30/20 0843 10 mEq at 12/30/20 0843    magnesium sulfate 2 g in 50 mL IVPB premix  2 g Intravenous PRN Taylor Hart MD        insulin lispro (HUMALOG) injection vial 0-6 Units  0-6 Units Subcutaneous TID  Taylor Hart MD  insulin lispro (HUMALOG) injection vial 0-3 Units  0-3 Units Subcutaneous Nightly Maria Fernanda Maher MD        glucose (GLUTOSE) 40 % oral gel 15 g  15 g Oral PRN Maria Fernanda Maher MD        dextrose 50 % IV solution  12.5 g Intravenous PRN Maria Fernanda Maher MD        glucagon (rDNA) injection 1 mg  1 mg Intramuscular PRN Maria Fernanda Maher MD        dextrose 5 % solution  100 mL/hr Intravenous PRN Maria Fernanda Maher MD        pantoprazole (PROTONIX) tablet 40 mg  40 mg Oral QAM AC Maria Fernanda Maher MD        promethazine [facility]) injection 25 mg  25 mg Intravenous Q6H PRN Maria Fernanda Maher MD   25 mg at 12/30/20 0370    metroNIDAZOLE (FLAGYL) tablet 500 mg  500 mg Oral 3 times per day Maria Fernanda Maher MD   500 mg at 12/29/20 2204        Labs:     Recent Labs     12/29/20  1350 12/30/20  0452   WBC 6.2 6.0   RBC 4.18* 3.52*   HGB 12.0 10.4*   HCT 36.7* 30.4*   MCV 87.8 86.4   MCH 28.7 29.5   MCHC 32.7* 34.2    161     Recent Labs     12/29/20  1348 12/30/20  0452 12/30/20  0947    140  --    K 3.2* 2.4* 2.5*   ANIONGAP 13 14  --     103  --    CO2 17* 23  --    BUN 21 18  --    CREATININE 1.7* 1.5*  --    GLUCOSE 118* 124*  --    CALCIUM 8.3 7.5*  --      Recent Labs     12/30/20  0452   MG 1.3*     Recent Labs     12/29/20  1348 12/30/20  0452   AST 14 12   ALT 15 13   BILITOT 0.5 0.5   ALKPHOS 101 86     ABGs:No results for input(s): PH, PO2, PCO2, HCO3, BE, O2SAT in the last 72 hours. Troponin T: No results for input(s): TROPONINI in the last 72 hours. INR: No results for input(s): INR in the last 72 hours. Lactic Acid: No results for input(s): LACTA in the last 72 hours.     Objective:   Vitals: /65   Pulse 60   Temp 96.9 °F (36.1 °C) (Temporal)   Resp 15   SpO2 99%   24HR INTAKE/OUTPUT:      Intake/Output Summary (Last 24 hours) at 12/30/2020 1150  Last data filed at 12/30/2020 0800  Gross per 24 hour   Intake 120 ml   Output    Net 120 ml     General appearance: Alert  Head: NC/AT Eyes: conjunctivae/corneas clear   Ears: normal external ears  Neck: Supple  Lungs: BLAE, no wheezing   Heart: RRR   Abdomen: BS+, soft, NT  Extremities: no edema  Skin: warm  Neurologic: Alert, gross motor function intact  Psychiatric:  Mood appropriate    Assessment and Plan:   Principal Problem:    Diarrhea  Active Problems:    Essential hypertension    Permanent atrial fibrillation (HCC)    ROSALES on CPAP    CPAP (continuous positive airway pressure) dependence    Coronary artery disease due to calcified coronary lesion    Type 2 diabetes mellitus with diabetic polyneuropathy, without long-term current use of insulin (HCC)    Acquired hypothyroidism    Chronic diastolic congestive heart failure (HCC)    Stage 3 chronic kidney disease    Pancreatic cyst  Resolved Problems:    * No resolved hospital problems. *    Diarrhea/Inability to tolerate PO intake/?enterocolitis: Ciprofloxacin/Flagyl. Antiemetics PRN. IVF. Supportive management. C. Diff antigen negative. Stool culture currently unremarkable. GI consulted for chronic diarrhea. ?Colovesicular fistula: Noted to have fecal material coming from urethra. Urology consulted. Pulmonary nodules/Pancreatic cysts: Will require outpatient follow-up imaging. Hypokalemia/hypomagnesemia: Replacement protocol in place. Monitor BMP. CIELO on CKD III: IVF. US Renal noted. UA pending. Monitor BMP.     HTN: Monitor BP and adjust medications PRN.     DM: HbA1c 5.5. ISS. Monitor BG and adjust medications PRN.    Afib: RC/AC.     ROSALES on CPAP: Continue CPAP at night.     HFpEF: Monitor I's/O's.     Supportive management. PT/OT/SLP.     Advance Directive: Full Code    DVT prophylaxis: Xarelto    Discharge planning: TBD      Signed:  Catherine Russell MD 12/30/2020 11:50 AM  Rounding Hospitalist

## 2020-12-31 NOTE — PROGRESS NOTES
Speech Language Pathology  Facility/Department: [facility] 5 SURG SERVICES  SWALLOW THERAPY     NAME: Jonnie Hall  : 5/3/1925  MRN: [id number]    ADMISSION DATE: 2020  ADMITTING DIAGNOSIS: has Essential hypertension; Permanent atrial fibrillation ([address] Pure hypercholesterolemia; ROSALES on CPAP; CPAP (continuous positive airway pressure) dependence; Left carotid bruit; Blister (nonthermal), left lower leg, initial encounter; Coronary artery disease due to calcified coronary lesion; Obesity (BMI 30-39.9); Type 2 diabetes mellitus with diabetic polyneuropathy, without long-term current use of insulin ([address] Acquired hypothyroidism; Mild mitral regurgitation; Exogenous obesity; Chronic diastolic congestive heart failure ([address] Mild aortic regurgitation; Vitamin D deficiency; Stage 3 chronic kidney disease; SSS (sick sinus syndrome) ([address] Diarrhea; and Pancreatic cyst on their problem list.    Date of Treat: 2020  Evaluating Therapist: Hoa Haas    Current Diet level:  Soft and bite sized consistency with thin liquids    Primary Complaint  Patient requesting regular solid consistency    Reason for Referral  Jonnie Hall was referred for a bedside swallow evaluation to assess the efficiency of her swallow function, identify signs and symptoms of aspiration and make recommendations regarding safe dietary consistencies, effective compensatory strategies, and safe eating environment. Impression  Re-assessed patient's swallowing function. Patient exhibits decreased oral prep and decreased oral transit of more solid consistencies (regular solid consistency residue cleared from the mouth with additional dry swallows). Patient also exhibits sluggish, mild-moderately decreased laryngeal elevation for swallow airway protection. Even so, no outward S/S penetration/aspiration was noted with an regular solid consistency trial or thin H2O presentation administered during treatment session this date. At this time, per patient request, would trial regular solid consistency. Continue thin liquids. Assist during meals. Recommend meds in pudding/applesauce. Will continue to follow. Treatment Plan  Requires SLP Intervention: Yes     Recommended Diet and Intervention  Diet Solids Recommendation: Regular solid   Liquid Consistency Recommendation: Thin    Recommended Form of Meds: Meds in puree as able  Therapeutic Interventions: Patient/Family education;Diet tolerance monitoring     Compensatory Swallowing Strategies  Compensatory Swallowing Strategies: Upright as possible for all oral intake;Assist during meals;Small bites/sips;Eat/Feed slowly; Alternate solids and liquids; Remain upright for 30-45 minutes after meals     Treatment/Goals  Timeframe for Short-term Goals: 1x/day for 3 days   Goal 1: Patient will tolerate regular solid consistency and thin liquids with min S/S penetration/aspiration during PO intake. Goal 2: Patient staff will follow swallow safety recommendations to decrease risk of penetration/aspiration during PO intake. General  Chart Reviewed: Yes  Behavior/Cognition: Alert; Cooperative  O2 Device: None (Room air)  Communication Observation: (SLP ranked functional intelligibility of speech for unfamiliar listeners at 100% in utterances without background noise present.)  Follows Directions: Simple   Dentition: Adequate  Patient Positioning: Upright in bed  Consistencies Administered: Regular solid; Thin - straw     Re-assessed patient's swallowing function with the following observations noted:     Oral Phase  Mastication: Regular solid (Patient exhibited decreased rotary jaw movement during oral prep of regular solid consistency trials presented independently.)  Decreased Oral Transit: Regular solid (Min-moderate oral cavity residue was noted post swallows; residue cleared from the mouth with additional dry swallows.) Oral Phase - Comment: Oral transit of regular solid consistency primarily measured 1-2 seconds in length. Oral transit of thin H2O presentations, administered via straw by family member, primarily measured 1-2 seconds in length. Pharyngeal Phase  Laryngeal Elevation: (Patient exhibited sluggish, mild-moderately decreased laryngeal elevation for swallow airway protection.)  Pharyngeal Phase - Comment: No outward S/S penetration/aspiration was noted with an regular solid consistency trial or thin H2O presentation administered during treatment session this date. At this time, per patient request, would trial regular solid consistency. Continue thin liquids. Assist during meals. Recommend meds in pudding/applesauce. Will continue to follow.     Electronically signed by GINETTE Pak on 12/31/2020 at 12:35 PM

## 2020-12-31 NOTE — CONSULTS
[facility] [employer] [facility] [address], [address] [facility]                                  CONSULTATION    PATIENT NAME: Raquel Cisneros                    :        1925  MED REC NO:   [id number]                              ROOM:       [facility]  ACCOUNT NO:   [de-identified]                           ADMIT DATE: 2020  PROVIDER:     Lilly Vargas MD    CONSULT DATE:  2020    GI CONSULTATION    ASSESSMENT:  1.  A 2-week history of diarrhea, which is culture negative for stool  pathogens or Clostridium difficile, etiology to be clarified with  additional tests. 2.  Mild normocytic normochromic anemia with a hemoglobin of 10.4 gm  without history of GI bleeding symptoms. PLAN:  1. Serology for celiac disease. 2.  Screen for malabsorption with qualitative stool fat smear and serum  B12 level. 3.  Withhold Pradaxa with plan to pursue limited colonoscopy on Friday  if possible. HISTORY OF PRESENT ILLNESS:  This pleasant 42-year-old white female was  interviewed in our hospital room with her son present. He provided some  observations. The patient is somewhat forgetful. She previously had a  normal bowel habit until approximately 2 weeks ago when she began to  pass multiple liquid stools daily. There was no observed blood, pus or  undigested food matter in her stool. She is not aware of any fever,  chills or progressive weight loss. She has had a colonoscopy  approximately 30 or 40 years ago, which was reportedly normal.  She has  anemia but no symptoms of GI bleeding. She lives in her own home and  has a public water supply. There are no sick animals or pets. She has  not had any travel or exposures. Initial diarrheal workup done a couple of days ago shows stool culture  and Clostridium difficile negative. CAT scan of the abdomen does not  suggest any bowel wall thickening to have suspicion of a colitis. Possibility of a microscopic colitis is still possible. CAT scan does  show multiple fluid-filled loops of small and large bowel, but no  inflammatory changes. She is not mistakenly taking any laxatives or  other medications that can cause diarrhea at home. Serial CBCs showed  no leukocytosis, but a mild normocytic normochromic anemia with  hemoglobin of 10.4 gm and hematocrit 30.4% and normal platelet count. Serum B12 level and ferritin within normal range previously. More  advanced stool cultures that included negative toxigenic E. coli,  Entamoeba histolytica, and Shigella. PAST MEDICAL HISTORY:  Calcified coronary arteries; chronic permanent  atrial fibrillation, on Xarelto; diastolic CHF; hypertension;  hyperlipidemia; chronic kidney disease stage III; hypothyroidism; and  obstructive sleep apnea, on CPAP at night. PAST SURGICAL HISTORY:  Appendectomy, hysterectomy, cholecystectomy,  permanent pacemaker. HOME MEDICATIONS:  Reviewed in the home medication reconciliation list.    ALLERGIES TO MEDICATIONS OR FOODS:  None known. SOCIAL HISTORY:  No habits of alcohol, tobacco.    FAMILY HISTORY:  Noncontributory. REVIEW OF SYSTEMS:  The patient denies weight loss but seems to have a  decreased appetite and suspected decline in weight insidiously. PHYSICAL EXAMINATION:  GENERAL:  Afebrile. Normal complexion. VITAL SIGNS:  Stable. SKIN:  No skin rash or eruptions. HEENT:  Nonicteric sclerae. Normal papillation of the tongue. NECK:  No regional lymphadenopathy palpable. LUNGS:  Clear breath sounds bilaterally. HEART:  No S3 or murmur. ABDOMEN:  Nontender, nondistended abdomen, old laparotomy scar. No  localizing abdominal tenderness. No masses. RECTUM:  Empty. Will consider performing both EGD for small bowel biopsy and evaluation  of anemia as well as colonoscopy for evaluation of diarrhea. Will  withhold Pradaxa for 2 days and then perform endoscopy on Friday. John Paul Robles MD    D: 12/30/2020 19:11:13      T: 12/30/2020 19:19:44     [email or web address]  Job#: [id number]     Doc#: [id number]    CC:

## 2020-12-31 NOTE — PROGRESS NOTES
[facility]        H[facility]ist Progress Note  12/31/2020 12:46 PM  Subjective:   Admit Date: 12/29/2020  PCP: Nishi Davis MD    Chief Complaint: Direct admission for diarrhea    Subjective: Patient seen and examined at bedside. Sitting in bedside chair. Appears comfortable. Diarrhea continues. Cumulative Hospital History: 80 y.o. female with a past medical history of CKD 3, hypertension, hypothyroidism, diabetes not on insulin, chronic atrial fibrillation on Xarelto, hyperlipidemia, ROSALES on CPAP, HFpEF who went to her PCP today for follow-up visit brought in by family. CT abdomen/pelvis showing possible enterocolitis and patient started on empiric antibiotics. 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 GI consulted for chronic diarrhea. Stool culture negative to date. C. difficile antigen unremarkable. Patient was noted to have pulmonary nodules and pancreatic cysts which will require outpatient follow-up imaging. ROS: 14 point review of systems is negative except as specifically addressed above. DIET GENERAL;     Intake/Output Summary (Last 24 hours) at 12/31/2020 1246  Last data filed at 12/31/2020 1032  Gross per 24 hour   Intake 613 ml   Output 200 ml   Net 413 ml     Medications:   sodium bicarbonate infusion 100 mL/hr at 12/31/20 1162    dextrose       Current Facility-Administered Medications   Medication Dose Route Frequency Provider Last Rate Last Admin    potassium chloride (KLOR-CON M) extended release tablet 40 mEq  40 mEq Oral BID Maria Fernanda Maher MD   40 mEq at 12/31/20 0943    ciprofloxacin (CIPRO) IVPB 400 mg  400 mg Intravenous Q24H Maria Fernanda Maher MD        sodium bicarbonate 150 mEq in dextrose 5 % 1,000 mL infusion   Intravenous Continuous Maria Fernanda Maher  mL/hr at 12/31/20 0938 New Bag at 12/31/20 0938    amLODIPine (NORVASC) tablet 10 mg  10 mg Oral Daily Maria Fernanda Maher MD   10 mg at 12/31/20 3051  carvedilol (COREG) tablet 25 mg  25 mg Oral BID  Carmel Gong MD   25 mg at 12/31/20 0927    dronedarone hcl (MULTAQ) tablet 400 mg  400 mg Oral BID  Carmel Gong MD   400 mg at 12/31/20 4636    levothyroxine (SYNTHROID) tablet 75 mcg  75 mcg Oral Daily Carmel Gong MD   75 mcg at 12/31/20 4706    pravastatin (PRAVACHOL) tablet 40 mg  40 mg Oral QPM Carmel Gong MD   40 mg at 12/30/20 1717    ranolazine (RANEXA) extended release tablet 500 mg  500 mg Oral BID Carmel Gong MD   500 mg at 12/31/20 9028    [Held by provider] rivaroxaban (XARELTO) tablet 15 mg  15 mg Oral Daily Carmel Gong MD   15 mg at 12/30/20 1718    sodium chloride flush 0.9 % injection 10 mL  10 mL Intravenous 2 times per day Carmel Gong MD   10 mL at 12/31/20 0945    sodium chloride flush 0.9 % injection 10 mL  10 mL Intravenous PRN Carmel Gong MD        polyethylene glycol [facility]) packet 17 g  17 g Oral Daily PRN Carmel Gong MD        acetaminophen (TYLENOL) tablet 650 mg  650 mg Oral Q6H PRN Carmel Gong MD        Or    acetaminophen (TYLENOL) suppository 650 mg  650 mg Rectal Q6H PRN Carmel Gong MD        potassium chloride (KLOR-CON M) extended release tablet 40 mEq  40 mEq Oral PRN Carmel Gong MD   40 mEq at 12/30/20 8918    Or    potassium bicarb-citric acid (EFFER-K) effervescent tablet 40 mEq  40 mEq Oral PRN Carmel Gong MD        Or    potassium chloride 10 mEq/100 mL IVPB (Peripheral Line)  10 mEq Intravenous PRN Carmel Gong  mL/hr at 12/30/20 1832 10 mEq at 12/30/20 1832    magnesium sulfate 2 g in 50 mL IVPB premix  2 g Intravenous PRN Carmel Gong MD        insulin lispro (HUMALOG) injection vial 0-6 Units  0-6 Units Subcutaneous TID  Carmel Gong MD        insulin lispro (HUMALOG) injection vial 0-3 Units  0-3 Units Subcutaneous Nightly Carmel Gong MD        glucose (GLUTOSE) 40 % oral gel 15 g  15 g Oral PRN Carmel Gong MD  dextrose 50 % IV solution  12.5 g Intravenous PRN Neeraj España MD        glucagon (rDNA) injection 1 mg  1 mg Intramuscular PRN Neeraj España MD        dextrose 5 % solution  100 mL/hr Intravenous PRN Neeraj España MD        pantoprazole (PROTONIX) tablet 40 mg  40 mg Oral QAM AC Neeraj España MD   40 mg at 12/31/20 0950    promethazine (PHENERGAN) injection 25 mg  25 mg Intravenous Q6H PRN Neeraj España MD   25 mg at 12/30/20 9997    metroNIDAZOLE (FLAGYL) tablet 500 mg  500 mg Oral 3 times per day Neeraj España MD   500 mg at 12/31/20 0058        Labs:     Recent Labs     12/29/20  1350 12/30/20  0452 12/31/20  0438   WBC 6.2 6.0 7.1   RBC 4.18* 3.52* 4.16*   HGB 12.0 10.4* 12.1   HCT 36.7* 30.4* 35.7*   MCV 87.8 86.4 85.8   MCH 28.7 29.5 29.1   MCHC 32.7* 34.2 33.9    161 211     Recent Labs     12/30/20  0452 12/30/20  0947 12/30/20  1453 12/31/20  0438     --  139 142   K 2.4* 2.5* 2.5* 3.4*   ANIONGAP 14  --  11 11     --  104 106   CO2 23  --  24 25   BUN 18  --  16 11   CREATININE 1.5*  --  1.3* 1.1*   GLUCOSE 124*  --  156* 137*   CALCIUM 7.5*  --  8.0* 8.1*     Recent Labs     12/30/20  0452 12/30/20  1453 12/31/20  0438   MG 1.3* 3.5* 2.7*     Recent Labs     12/29/20  1348 12/30/20  0452 12/31/20  0438   AST 14 12 19   ALT 15 13 17   BILITOT 0.5 0.5 0.5   ALKPHOS 101 86 98     ABGs:No results for input(s): PH, PO2, PCO2, HCO3, BE, O2SAT in the last 72 hours. Troponin T: No results for input(s): TROPONINI in the last 72 hours. INR: No results for input(s): INR in the last 72 hours. Lactic Acid: No results for input(s): LACTA in the last 72 hours.     Objective:   Vitals: BP (!) 167/65   Pulse 64   Temp 97.8 °F (36.6 °C)   Resp 16   SpO2 95%   24HR INTAKE/OUTPUT:      Intake/Output Summary (Last 24 hours) at 12/31/2020 1246  Last data filed at 12/31/2020 1032  Gross per 24 hour   Intake 613 ml   Output 200 ml   Net 413 ml     General appearance: Alert  Head: NC/AT Eyes: conjunctivae/corneas clear   Ears: normal external ears  Neck: Supple  Lungs: BLAE, no wheezing   Heart: RRR   Abdomen: BS+, soft, NT  Extremities: no edema  Skin: warm  Neurologic: Alert, gross motor function intact  Psychiatric:  Mood appropriate    Assessment and Plan:   Principal Problem:    Diarrhea  Active Problems:    Essential hypertension    Permanent atrial fibrillation (HCC)    ROSALES on CPAP    CPAP (continuous positive airway pressure) dependence    Coronary artery disease due to calcified coronary lesion    Type 2 diabetes mellitus with diabetic polyneuropathy, without long-term current use of insulin (HCC)    Acquired hypothyroidism    Chronic diastolic congestive heart failure (HCC)    Stage 3 chronic kidney disease    Pancreatic cyst  Resolved Problems:    * No resolved hospital problems. *    Diarrhea/Inability to tolerate PO intake/?enterocolitis: Ciprofloxacin/Flagyl. Antiemetics PRN. IVF. Supportive management. C. Diff antigen negative. Stool culture currently unremarkable. GI on board. Plan for colonoscopy tomorrow. Celiac studies pending. ?Colovesicular fistula: Urology has evaluated - no evidence of fistula. Pulmonary nodules/Pancreatic cysts: Will require outpatient follow-up imaging. Hypokalemia/hypomagnesemia: Improving. Replacement protocol in place. Monitor BMP. CIELO on CKD III: Improving. IVF. US Renal noted. Monitor BMP.     HTN: Monitor BP and adjust medications PRN.     DM: HbA1c 5.5. ISS. Monitor BG and adjust medications PRN.    Afib: TANVIR. AC held for endoscopy tomorrow.     ROSALES on CPAP: Continue CPAP at night.     HFpEF: Monitor I's/O's.     Supportive management. PT/OT/SLP.     Advance Directive: Full Code    DVT prophylaxis: Xarelto held for procedure    Discharge planning: TBD      Signed:  Chandler Guzman MD 12/31/2020 12:46 PM  Rounding Hospitalist

## 2021-01-01 ENCOUNTER — TELEPHONE (OUTPATIENT)
Dept: INTERNAL MEDICINE | Age: 86
End: 2021-01-01

## 2021-01-01 ENCOUNTER — APPOINTMENT (OUTPATIENT)
Dept: GENERAL RADIOLOGY | Facility: HOSPITAL | Age: 86
End: 2021-01-01

## 2021-01-01 ENCOUNTER — CARE COORDINATION (OUTPATIENT)
Dept: CASE MANAGEMENT | Age: 86
End: 2021-01-01

## 2021-01-01 ENCOUNTER — APPOINTMENT (OUTPATIENT)
Dept: CT IMAGING | Age: 86
DRG: 371 | End: 2021-01-01
Payer: MEDICARE

## 2021-01-01 ENCOUNTER — ANESTHESIA EVENT (OUTPATIENT)
Dept: ENDOSCOPY | Age: 86
DRG: 392 | End: 2021-01-01
Payer: MEDICARE

## 2021-01-01 ENCOUNTER — TELEPHONE (OUTPATIENT)
Dept: UROLOGY | Age: 86
End: 2021-01-01

## 2021-01-01 ENCOUNTER — ANESTHESIA (OUTPATIENT)
Dept: ENDOSCOPY | Age: 86
DRG: 392 | End: 2021-01-01
Payer: MEDICARE

## 2021-01-01 ENCOUNTER — HOSPITAL ENCOUNTER (INPATIENT)
Age: 86
LOS: 3 days | Discharge: HOME | DRG: 371 | End: 2021-01-15
Attending: EMERGENCY MEDICINE | Admitting: STUDENT IN AN ORGANIZED HEALTH CARE EDUCATION/TRAINING PROGRAM
Payer: MEDICARE

## 2021-01-01 ENCOUNTER — APPOINTMENT (OUTPATIENT)
Dept: CT IMAGING | Facility: HOSPITAL | Age: 86
End: 2021-01-01

## 2021-01-01 ENCOUNTER — VIRTUAL VISIT (OUTPATIENT)
Dept: INTERNAL MEDICINE | Age: 86
End: 2021-01-01
Payer: MEDICARE

## 2021-01-01 ENCOUNTER — TELEPHONE (OUTPATIENT)
Dept: CARDIOLOGY | Facility: CLINIC | Age: 86
End: 2021-01-01

## 2021-01-01 ENCOUNTER — VIRTUAL VISIT (OUTPATIENT)
Dept: GASTROENTEROLOGY | Age: 86
End: 2021-01-01
Payer: MEDICARE

## 2021-01-01 ENCOUNTER — APPOINTMENT (OUTPATIENT)
Dept: GENERAL RADIOLOGY | Age: 86
DRG: 371 | End: 2021-01-01
Payer: MEDICARE

## 2021-01-01 ENCOUNTER — HOSPITAL ENCOUNTER (INPATIENT)
Facility: HOSPITAL | Age: 86
LOS: 1 days | End: 2021-01-24
Attending: FAMILY MEDICINE | Admitting: FAMILY MEDICINE

## 2021-01-01 VITALS
HEIGHT: 60 IN | RESPIRATION RATE: 16 BRPM | OXYGEN SATURATION: 92 % | DIASTOLIC BLOOD PRESSURE: 86 MMHG | SYSTOLIC BLOOD PRESSURE: 134 MMHG | HEART RATE: 109 BPM | BODY MASS INDEX: 27.61 KG/M2 | WEIGHT: 140.65 LBS | TEMPERATURE: 96.2 F

## 2021-01-01 VITALS
BODY MASS INDEX: 27.97 KG/M2 | WEIGHT: 152 LBS | SYSTOLIC BLOOD PRESSURE: 140 MMHG | DIASTOLIC BLOOD PRESSURE: 70 MMHG | TEMPERATURE: 97.3 F | HEART RATE: 73 BPM | OXYGEN SATURATION: 99 % | HEIGHT: 62 IN | RESPIRATION RATE: 17 BRPM

## 2021-01-01 VITALS — DIASTOLIC BLOOD PRESSURE: 68 MMHG | OXYGEN SATURATION: 100 % | SYSTOLIC BLOOD PRESSURE: 117 MMHG

## 2021-01-01 VITALS
HEART RATE: 61 BPM | RESPIRATION RATE: 18 BRPM | DIASTOLIC BLOOD PRESSURE: 70 MMHG | TEMPERATURE: 97.7 F | OXYGEN SATURATION: 98 % | SYSTOLIC BLOOD PRESSURE: 136 MMHG

## 2021-01-01 DIAGNOSIS — R19.7 DIARRHEA, UNSPECIFIED TYPE: Primary | ICD-10-CM

## 2021-01-01 DIAGNOSIS — N17.9 ACUTE KIDNEY INJURY (HCC): ICD-10-CM

## 2021-01-01 DIAGNOSIS — K92.2 GASTROINTESTINAL HEMORRHAGE, UNSPECIFIED GASTROINTESTINAL HEMORRHAGE TYPE: ICD-10-CM

## 2021-01-01 DIAGNOSIS — R53.1 GENERAL WEAKNESS: ICD-10-CM

## 2021-01-01 DIAGNOSIS — R54 ADVANCED AGE: ICD-10-CM

## 2021-01-01 DIAGNOSIS — I50.32 CHRONIC DIASTOLIC CONGESTIVE HEART FAILURE (HCC): Primary | ICD-10-CM

## 2021-01-01 DIAGNOSIS — I48.21 PERMANENT ATRIAL FIBRILLATION (HCC): ICD-10-CM

## 2021-01-01 DIAGNOSIS — R63.8 POOR FLUID INTAKE: ICD-10-CM

## 2021-01-01 DIAGNOSIS — R19.5 FAT IN STOOL: ICD-10-CM

## 2021-01-01 DIAGNOSIS — E86.0 DEHYDRATION: ICD-10-CM

## 2021-01-01 DIAGNOSIS — I10 ESSENTIAL HYPERTENSION: ICD-10-CM

## 2021-01-01 DIAGNOSIS — E87.6 HYPOKALEMIA: ICD-10-CM

## 2021-01-01 DIAGNOSIS — N39.0 E. COLI UTI: ICD-10-CM

## 2021-01-01 DIAGNOSIS — E83.42 HYPOMAGNESEMIA: ICD-10-CM

## 2021-01-01 DIAGNOSIS — D37.9 NEOPLASM OF UNCERTAIN BEHAVIOR OF DIGESTIVE ORGAN, UNSPECIFIED: ICD-10-CM

## 2021-01-01 DIAGNOSIS — K52.9 ENTEROCOLITIS: Primary | ICD-10-CM

## 2021-01-01 DIAGNOSIS — D50.0 ANEMIA, BLOOD LOSS: ICD-10-CM

## 2021-01-01 DIAGNOSIS — N17.9 AKI (ACUTE KIDNEY INJURY) (HCC): ICD-10-CM

## 2021-01-01 DIAGNOSIS — B96.20 E. COLI UTI: ICD-10-CM

## 2021-01-01 DIAGNOSIS — I48.0 PAROXYSMAL ATRIAL FIBRILLATION (HCC): Primary | ICD-10-CM

## 2021-01-01 DIAGNOSIS — D68.9 COAGULOPATHY (HCC): Primary | ICD-10-CM

## 2021-01-01 DIAGNOSIS — N18.31 STAGE 3A CHRONIC KIDNEY DISEASE (HCC): ICD-10-CM

## 2021-01-01 DIAGNOSIS — R19.7 DIARRHEA, UNSPECIFIED TYPE: ICD-10-CM

## 2021-01-01 DIAGNOSIS — E83.51 HYPOCALCEMIA: ICD-10-CM

## 2021-01-01 DIAGNOSIS — E87.20 METABOLIC ACIDOSIS: Primary | ICD-10-CM

## 2021-01-01 DIAGNOSIS — N28.9 ACUTE ON CHRONIC RENAL INSUFFICIENCY: ICD-10-CM

## 2021-01-01 DIAGNOSIS — N39.0 ACUTE UTI: ICD-10-CM

## 2021-01-01 DIAGNOSIS — R77.8 ELEVATED TROPONIN: ICD-10-CM

## 2021-01-01 DIAGNOSIS — R79.1 SUPRATHERAPEUTIC INR: ICD-10-CM

## 2021-01-01 DIAGNOSIS — I95.89 HYPOTENSION DUE TO HYPOVOLEMIA: ICD-10-CM

## 2021-01-01 DIAGNOSIS — E86.1 HYPOTENSION DUE TO HYPOVOLEMIA: ICD-10-CM

## 2021-01-01 DIAGNOSIS — R11.0 NAUSEA: ICD-10-CM

## 2021-01-01 DIAGNOSIS — I50.9 OTHER CONGESTIVE HEART FAILURE (HCC): ICD-10-CM

## 2021-01-01 DIAGNOSIS — I50.32 CHRONIC DIASTOLIC CONGESTIVE HEART FAILURE (HCC): ICD-10-CM

## 2021-01-01 DIAGNOSIS — I48.21 PERMANENT ATRIAL FIBRILLATION (HCC): Primary | ICD-10-CM

## 2021-01-01 DIAGNOSIS — K86.2 CYSTIC MASS OF PANCREAS: ICD-10-CM

## 2021-01-01 DIAGNOSIS — N18.9 ACUTE ON CHRONIC RENAL INSUFFICIENCY: ICD-10-CM

## 2021-01-01 LAB
ABO GROUP BLD: NORMAL
ABO/RH: NORMAL
ALBUMIN SERPL-MCNC: 2.1 G/DL (ref 3.5–5.2)
ALBUMIN SERPL-MCNC: 2.2 G/DL (ref 3.5–5.2)
ALBUMIN SERPL-MCNC: 2.2 G/DL (ref 3.5–5.2)
ALBUMIN SERPL-MCNC: 2.3 G/DL (ref 3.5–5.2)
ALBUMIN SERPL-MCNC: 2.3 G/DL (ref 3.5–5.2)
ALBUMIN SERPL-MCNC: 2.9 G/DL (ref 3.5–5.2)
ALBUMIN SERPL-MCNC: 2.9 G/DL (ref 3.5–5.2)
ALBUMIN/GLOB SERPL: 0.9 G/DL
ALP BLD-CCNC: 59 U/L (ref 35–104)
ALP BLD-CCNC: 83 U/L (ref 35–104)
ALP BLD-CCNC: 86 U/L (ref 35–104)
ALP SERPL-CCNC: 62 U/L (ref 39–117)
ALT SERPL W P-5'-P-CCNC: 13 U/L (ref 1–33)
ALT SERPL-CCNC: 15 U/L (ref 5–33)
ALT SERPL-CCNC: 15 U/L (ref 5–33)
ALT SERPL-CCNC: 6 U/L (ref 5–33)
ANION GAP SERPL CALCULATED.3IONS-SCNC: 10 MMOL/L (ref 7–19)
ANION GAP SERPL CALCULATED.3IONS-SCNC: 11 MMOL/L (ref 7–19)
ANION GAP SERPL CALCULATED.3IONS-SCNC: 12 MMOL/L (ref 5–15)
ANION GAP SERPL CALCULATED.3IONS-SCNC: 13 MMOL/L (ref 7–19)
ANION GAP SERPL CALCULATED.3IONS-SCNC: 14 MMOL/L (ref 7–19)
ANION GAP SERPL CALCULATED.3IONS-SCNC: 9 MMOL/L (ref 7–19)
ANION GAP SERPL CALCULATED.3IONS-SCNC: 9 MMOL/L (ref 7–19)
ANISOCYTOSIS: ABNORMAL
ANTIBODY SCREEN: NORMAL
APTT PPP: 26.4 SECONDS (ref 24.1–35)
APTT: 49.7 SEC (ref 26–36.2)
ARTERIAL PATENCY WRIST A: POSITIVE
AST SERPL-CCNC: 16 U/L (ref 5–32)
AST SERPL-CCNC: 18 U/L (ref 5–32)
AST SERPL-CCNC: 18 U/L (ref 5–32)
AST SERPL-CCNC: 9 U/L (ref 1–32)
ATMOSPHERIC PRESS: 759 MMHG
BACTERIA UR QL AUTO: ABNORMAL /HPF
BACTERIA: ABNORMAL /HPF
BANDED NEUTROPHILS RELATIVE PERCENT: 26 % (ref 0–5)
BASE EXCESS BLDA CALC-SCNC: -14.2 MMOL/L (ref 0–2)
BASOPHILS # BLD AUTO: 0.02 10*3/MM3 (ref 0–0.2)
BASOPHILS ABSOLUTE: 0 K/UL (ref 0–0.2)
BASOPHILS NFR BLD AUTO: 0.3 % (ref 0–1.5)
BASOPHILS RELATIVE PERCENT: 0 % (ref 0–1)
BASOPHILS RELATIVE PERCENT: 0.4 % (ref 0–1)
BASOPHILS RELATIVE PERCENT: 0.4 % (ref 0–1)
BDY SITE: ABNORMAL
BILIRUB SERPL-MCNC: 0.4 MG/DL (ref 0.2–1.2)
BILIRUB SERPL-MCNC: 0.4 MG/DL (ref 0–1.2)
BILIRUB UR QL STRIP: NEGATIVE
BILIRUBIN URINE: NEGATIVE
BLD GP AB SCN SERPL QL: NEGATIVE
BLOOD BANK DISPENSE STATUS: NORMAL
BLOOD BANK DISPENSE STATUS: NORMAL
BLOOD BANK PRODUCT CODE: NORMAL
BLOOD BANK PRODUCT CODE: NORMAL
BLOOD CULTURE, ROUTINE: NORMAL
BLOOD, URINE: ABNORMAL
BODY TEMPERATURE: 33.3 C
BPU ID: NORMAL
BPU ID: NORMAL
BUN BLDV-MCNC: 12 MG/DL (ref 8–23)
BUN BLDV-MCNC: 14 MG/DL (ref 8–23)
BUN BLDV-MCNC: 14 MG/DL (ref 8–23)
BUN BLDV-MCNC: 6 MG/DL (ref 8–23)
BUN BLDV-MCNC: 8 MG/DL (ref 8–23)
BUN BLDV-MCNC: 9 MG/DL (ref 8–23)
BUN SERPL-MCNC: 17 MG/DL (ref 8–23)
BUN/CREAT SERPL: 10.8 (ref 7–25)
BURR CELLS: ABNORMAL
CALCIUM IONIZED: 0.98 MMOL/L (ref 1.12–1.32)
CALCIUM SERPL-MCNC: 6.8 MG/DL (ref 8.2–9.6)
CALCIUM SERPL-MCNC: 7.2 MG/DL (ref 8.2–9.6)
CALCIUM SERPL-MCNC: 7.3 MG/DL (ref 8.2–9.6)
CALCIUM SERPL-MCNC: 7.4 MG/DL (ref 8.2–9.6)
CALCIUM SERPL-MCNC: 7.6 MG/DL (ref 8.2–9.6)
CALCIUM SERPL-MCNC: 7.6 MG/DL (ref 8.2–9.6)
CALCIUM SPEC-SCNC: 7.5 MG/DL (ref 8.2–9.6)
CAMPYLOBACTER ANTIGEN: NORMAL
CELIAC PANEL: 10 UNITS (ref 0–19)
CHLORIDE BLD-SCNC: 104 MMOL/L (ref 98–111)
CHLORIDE BLD-SCNC: 105 MMOL/L (ref 98–111)
CHLORIDE BLD-SCNC: 107 MMOL/L (ref 98–111)
CHLORIDE BLD-SCNC: 107 MMOL/L (ref 98–111)
CHLORIDE BLD-SCNC: 108 MMOL/L (ref 98–111)
CHLORIDE BLD-SCNC: 108 MMOL/L (ref 98–111)
CHLORIDE SERPL-SCNC: 111 MMOL/L (ref 98–107)
CLARITY UR: CLEAR
CLARITY: CLEAR
CO2 SERPL-SCNC: 13 MMOL/L (ref 22–29)
CO2: 13 MMOL/L (ref 22–29)
CO2: 15 MMOL/L (ref 22–29)
CO2: 17 MMOL/L (ref 22–29)
CO2: 18 MMOL/L (ref 22–29)
CO2: 25 MMOL/L (ref 22–29)
CO2: 26 MMOL/L (ref 22–29)
COARSE CASTS, UA: ABNORMAL /LPF (ref 0–5)
COLOR UR: YELLOW
COLOR: YELLOW
CREAT SERPL-MCNC: 0.9 MG/DL (ref 0.5–0.9)
CREAT SERPL-MCNC: 1 MG/DL (ref 0.5–0.9)
CREAT SERPL-MCNC: 1.1 MG/DL (ref 0.5–0.9)
CREAT SERPL-MCNC: 1.3 MG/DL (ref 0.5–0.9)
CREAT SERPL-MCNC: 1.5 MG/DL (ref 0.5–0.9)
CREAT SERPL-MCNC: 1.58 MG/DL (ref 0.57–1)
CREAT SERPL-MCNC: 1.6 MG/DL (ref 0.5–0.9)
CRP SERPL-MCNC: 0.19 MG/DL (ref 0–0.5)
CULTURE, BLOOD 2: NORMAL
CULTURE, STOOL: NORMAL
D-LACTATE SERPL-SCNC: 1.3 MMOL/L (ref 0.5–2)
DEPRECATED RDW RBC AUTO: 57.6 FL (ref 37–54)
DESCRIPTION BLOOD BANK: NORMAL
DESCRIPTION BLOOD BANK: NORMAL
DEVELOPER EXPIRATION DATE: NORMAL
DEVELOPER LOT NUMBER: 185
E COLI SHIGA TOXIN ASSAY: NORMAL
EKG P AXIS: NORMAL DEGREES
EKG P-R INTERVAL: NORMAL MS
EKG Q-T INTERVAL: 446 MS
EKG QRS DURATION: 118 MS
EKG QTC CALCULATION (BAZETT): 446 MS
EKG T AXIS: -177 DEGREES
EOSINOPHIL # BLD AUTO: 0 10*3/MM3 (ref 0–0.4)
EOSINOPHIL NFR BLD AUTO: 0 % (ref 0.3–6.2)
EOSINOPHILS ABSOLUTE: 0 K/UL (ref 0–0.6)
EOSINOPHILS ABSOLUTE: 0.1 K/UL (ref 0–0.6)
EOSINOPHILS ABSOLUTE: 0.2 K/UL (ref 0–0.6)
EOSINOPHILS RELATIVE PERCENT: 0 % (ref 0–5)
EOSINOPHILS RELATIVE PERCENT: 3.1 % (ref 0–5)
EOSINOPHILS RELATIVE PERCENT: 4.5 % (ref 0–5)
EPITHELIAL CELLS, UA: ABNORMAL /HPF
ERYTHROCYTE [DISTWIDTH] IN BLOOD BY AUTOMATED COUNT: 18.2 % (ref 12.3–15.4)
EXPIRATION DATE: NORMAL
FECAL NEUTRAL FAT: NORMAL
FECAL OCCULT BLOOD SCREEN, POC: NEGATIVE
FECAL SPLIT FATS: ABNORMAL
GFR AFRICAN AMERICAN: 36
GFR AFRICAN AMERICAN: 39
GFR AFRICAN AMERICAN: 46
GFR AFRICAN AMERICAN: 56
GFR AFRICAN AMERICAN: >59
GFR AFRICAN AMERICAN: >59
GFR NON-AFRICAN AMERICAN: 30
GFR NON-AFRICAN AMERICAN: 32
GFR NON-AFRICAN AMERICAN: 38
GFR NON-AFRICAN AMERICAN: 46
GFR NON-AFRICAN AMERICAN: 51
GFR NON-AFRICAN AMERICAN: 58
GFR SERPL CREATININE-BSD FRML MDRD: 30 ML/MIN/1.73
GLOBULIN UR ELPH-MCNC: 2.5 GM/DL
GLUCOSE BLD-MCNC: 104 MG/DL (ref 70–99)
GLUCOSE BLD-MCNC: 106 MG/DL (ref 70–99)
GLUCOSE BLD-MCNC: 109 MG/DL (ref 70–99)
GLUCOSE BLD-MCNC: 120 MG/DL (ref 70–99)
GLUCOSE BLD-MCNC: 120 MG/DL (ref 74–109)
GLUCOSE BLD-MCNC: 131 MG/DL (ref 70–99)
GLUCOSE BLD-MCNC: 132 MG/DL (ref 70–99)
GLUCOSE BLD-MCNC: 139 MG/DL (ref 74–109)
GLUCOSE BLD-MCNC: 143 MG/DL (ref 74–109)
GLUCOSE BLD-MCNC: 152 MG/DL (ref 70–99)
GLUCOSE BLD-MCNC: 153 MG/DL (ref 70–99)
GLUCOSE BLD-MCNC: 73 MG/DL (ref 74–109)
GLUCOSE BLD-MCNC: 76 MG/DL (ref 70–99)
GLUCOSE BLD-MCNC: 77 MG/DL (ref 70–99)
GLUCOSE BLD-MCNC: 77 MG/DL (ref 70–99)
GLUCOSE BLD-MCNC: 77 MG/DL (ref 74–109)
GLUCOSE BLD-MCNC: 81 MG/DL (ref 70–99)
GLUCOSE BLD-MCNC: 82 MG/DL (ref 74–109)
GLUCOSE BLD-MCNC: 85 MG/DL (ref 70–99)
GLUCOSE BLD-MCNC: 89 MG/DL (ref 70–99)
GLUCOSE BLD-MCNC: 92 MG/DL (ref 70–99)
GLUCOSE SERPL-MCNC: 166 MG/DL (ref 65–99)
GLUCOSE UR STRIP-MCNC: NEGATIVE MG/DL
GLUCOSE URINE: NEGATIVE MG/DL
HCO3 BLDA-SCNC: 13.5 MMOL/L (ref 20–26)
HCT VFR BLD AUTO: 28.6 % (ref 34–46.6)
HCT VFR BLD CALC: 26.6 % (ref 37–47)
HCT VFR BLD CALC: 26.8 % (ref 37–47)
HCT VFR BLD CALC: 26.9 % (ref 37–47)
HCT VFR BLD CALC: 26.9 % (ref 37–47)
HCT VFR BLD CALC: 27.6 % (ref 37–47)
HCT VFR BLD CALC: 28.3 % (ref 37–47)
HCT VFR BLD CALC: 29 % (ref 37–47)
HCT VFR BLD CALC: 32.2 % (ref 37–47)
HCT VFR BLD CALC: 33.2 % (ref 37–47)
HCT VFR BLD CALC: 35 % (ref 37–47)
HEMOGLOBIN: 10.6 G/DL (ref 12–16)
HEMOGLOBIN: 11.1 G/DL (ref 12–16)
HEMOGLOBIN: 11.3 G/DL (ref 12–16)
HEMOGLOBIN: 9 G/DL (ref 12–16)
HEMOGLOBIN: 9.2 G/DL (ref 12–16)
HEMOGLOBIN: 9.3 G/DL (ref 12–16)
HEMOGLOBIN: 9.5 G/DL (ref 12–16)
HEMOGLOBIN: 9.6 G/DL (ref 12–16)
HEMOGLOBIN: 9.9 G/DL (ref 12–16)
HEMOGLOBIN: 9.9 G/DL (ref 12–16)
HGB BLD-MCNC: 9.7 G/DL (ref 12–15.9)
HGB UR QL STRIP.AUTO: ABNORMAL
HOLD SPECIMEN: NORMAL
HYALINE CASTS UR QL AUTO: ABNORMAL /LPF
HYALINE CASTS: ABNORMAL /LPF (ref 0–5)
IMM GRANULOCYTES # BLD AUTO: 0.13 10*3/MM3 (ref 0–0.05)
IMM GRANULOCYTES NFR BLD AUTO: 1.7 % (ref 0–0.5)
IMMATURE GRANULOCYTES #: 0 K/UL
IMMATURE GRANULOCYTES #: 0 K/UL
IMMATURE GRANULOCYTES #: 0.1 K/UL
INR BLD: 1.19 (ref 0.88–1.18)
INR BLD: 1.61 (ref 0.88–1.18)
INR BLD: 3.2 (ref 0.88–1.18)
INR BLD: >18.8 (ref 0.88–1.18)
INR PPP: 1.07 (ref 0.91–1.09)
KETONES UR QL STRIP: NEGATIVE
KETONES, URINE: 15 MG/DL
LACTIC ACID: 1.1 MMOL/L (ref 0.5–1.9)
LEUKOCYTE ESTERASE UR QL STRIP.AUTO: ABNORMAL
LEUKOCYTE ESTERASE, URINE: ABNORMAL
LIPASE: 9 U/L (ref 13–60)
LYMPHOCYTES # BLD AUTO: 0.92 10*3/MM3 (ref 0.7–3.1)
LYMPHOCYTES ABSOLUTE: 0.6 K/UL (ref 1.1–4.5)
LYMPHOCYTES ABSOLUTE: 0.8 K/UL (ref 1.1–4.5)
LYMPHOCYTES ABSOLUTE: 0.9 K/UL (ref 1.1–4.5)
LYMPHOCYTES NFR BLD AUTO: 11.7 % (ref 19.6–45.3)
LYMPHOCYTES RELATIVE PERCENT: 13 % (ref 20–40)
LYMPHOCYTES RELATIVE PERCENT: 17.4 % (ref 20–40)
LYMPHOCYTES RELATIVE PERCENT: 21 % (ref 20–40)
Lab: 185
Lab: ABNORMAL
Lab: ABNORMAL
MAGNESIUM SERPL-MCNC: 2 MG/DL (ref 1.7–2.3)
MAGNESIUM: 1.6 MG/DL (ref 1.7–2.3)
MAGNESIUM: 2 MG/DL (ref 1.7–2.3)
MAGNESIUM: 2.2 MG/DL (ref 1.7–2.3)
MAGNESIUM: 2.3 MG/DL (ref 1.7–2.3)
MCH RBC QN AUTO: 28.7 PG (ref 27–31)
MCH RBC QN AUTO: 28.7 PG (ref 27–31)
MCH RBC QN AUTO: 28.8 PG (ref 27–31)
MCH RBC QN AUTO: 28.9 PG (ref 27–31)
MCH RBC QN AUTO: 29 PG (ref 27–31)
MCH RBC QN AUTO: 29.2 PG (ref 27–31)
MCH RBC QN AUTO: 29.3 PG (ref 27–31)
MCH RBC QN AUTO: 29.4 PG (ref 27–31)
MCH RBC QN AUTO: 29.5 PG (ref 26.6–33)
MCH RBC QN AUTO: 29.5 PG (ref 27–31)
MCH RBC QN AUTO: 29.6 PG (ref 27–31)
MCHC RBC AUTO-ENTMCNC: 32.3 G/DL (ref 33–37)
MCHC RBC AUTO-ENTMCNC: 32.8 G/DL (ref 33–37)
MCHC RBC AUTO-ENTMCNC: 32.9 G/DL (ref 33–37)
MCHC RBC AUTO-ENTMCNC: 33.4 G/DL (ref 33–37)
MCHC RBC AUTO-ENTMCNC: 33.6 G/DL (ref 33–37)
MCHC RBC AUTO-ENTMCNC: 33.9 G/DL (ref 31.5–35.7)
MCHC RBC AUTO-ENTMCNC: 33.9 G/DL (ref 33–37)
MCHC RBC AUTO-ENTMCNC: 34.2 G/DL (ref 33–37)
MCHC RBC AUTO-ENTMCNC: 35 G/DL (ref 33–37)
MCHC RBC AUTO-ENTMCNC: 35.9 G/DL (ref 33–37)
MCHC RBC AUTO-ENTMCNC: 36.8 G/DL (ref 33–37)
MCV RBC AUTO: 80.3 FL (ref 81–99)
MCV RBC AUTO: 81.4 FL (ref 81–99)
MCV RBC AUTO: 84.4 FL (ref 81–99)
MCV RBC AUTO: 84.6 FL (ref 81–99)
MCV RBC AUTO: 85.4 FL (ref 81–99)
MCV RBC AUTO: 86.8 FL (ref 81–99)
MCV RBC AUTO: 86.9 FL (ref 79–97)
MCV RBC AUTO: 87.6 FL (ref 81–99)
MCV RBC AUTO: 87.6 FL (ref 81–99)
MCV RBC AUTO: 88.2 FL (ref 81–99)
MCV RBC AUTO: 89.1 FL (ref 81–99)
MODALITY: ABNORMAL
MONOCYTES # BLD AUTO: 0.29 10*3/MM3 (ref 0.1–0.9)
MONOCYTES ABSOLUTE: 0.3 K/UL (ref 0–0.9)
MONOCYTES ABSOLUTE: 0.5 K/UL (ref 0–0.9)
MONOCYTES ABSOLUTE: 0.6 K/UL (ref 0–0.9)
MONOCYTES NFR BLD AUTO: 3.7 % (ref 5–12)
MONOCYTES RELATIVE PERCENT: 11.1 % (ref 0–10)
MONOCYTES RELATIVE PERCENT: 12.7 % (ref 0–10)
MONOCYTES RELATIVE PERCENT: 6 % (ref 0–10)
NEGATIVE CONTROL: NEGATIVE
NEUTROPHILS ABSOLUTE: 2.7 K/UL (ref 1.5–7.5)
NEUTROPHILS ABSOLUTE: 3 K/UL (ref 1.5–7.5)
NEUTROPHILS ABSOLUTE: 3.6 K/UL (ref 1.5–7.5)
NEUTROPHILS NFR BLD AUTO: 6.5 10*3/MM3 (ref 1.7–7)
NEUTROPHILS NFR BLD AUTO: 82.6 % (ref 42.7–76)
NEUTROPHILS RELATIVE PERCENT: 55 % (ref 50–65)
NEUTROPHILS RELATIVE PERCENT: 60.1 % (ref 50–65)
NEUTROPHILS RELATIVE PERCENT: 67.3 % (ref 50–65)
NITRITE UR QL STRIP: POSITIVE
NITRITE, URINE: POSITIVE
NOTIFIED BY: ABNORMAL
NOTIFIED WHO: ABNORMAL
NRBC BLD AUTO-RTO: 0.3 /100 WBC (ref 0–0.2)
NT-PROBNP SERPL-MCNC: ABNORMAL PG/ML (ref 0–1800)
ORGANISM: ABNORMAL
OVALOCYTES: ABNORMAL
PCO2 BLDA: 37.5 MM HG (ref 35–45)
PCO2 TEMP ADJ BLD: 31.9 MM HG (ref 35–45)
PDW BLD-RTO: 15.9 % (ref 11.5–14.5)
PDW BLD-RTO: 16.1 % (ref 11.5–14.5)
PDW BLD-RTO: 16.4 % (ref 11.5–14.5)
PDW BLD-RTO: 16.5 % (ref 11.5–14.5)
PDW BLD-RTO: 16.5 % (ref 11.5–14.5)
PDW BLD-RTO: 16.7 % (ref 11.5–14.5)
PDW BLD-RTO: 16.7 % (ref 11.5–14.5)
PDW BLD-RTO: 17.1 % (ref 11.5–14.5)
PDW BLD-RTO: 17.2 % (ref 11.5–14.5)
PDW BLD-RTO: 17.3 % (ref 11.5–14.5)
PERFORMED ON: ABNORMAL
PERFORMED ON: NORMAL
PH BLDA: 7.16 PH UNITS (ref 7.35–7.45)
PH UA: 5.5 (ref 5–8)
PH UR STRIP.AUTO: <=5 [PH] (ref 5–8)
PH, TEMP CORRECTED: 7.21 PH UNITS (ref 7.35–7.45)
PHOSPHATE SERPL-MCNC: 3.3 MG/DL (ref 2.5–4.5)
PHOSPHORUS: 1.8 MG/DL (ref 2.5–4.5)
PHOSPHORUS: 2.1 MG/DL (ref 2.5–4.5)
PHOSPHORUS: 2.3 MG/DL (ref 2.5–4.5)
PHOSPHORUS: 2.8 MG/DL (ref 2.5–4.5)
PHOSPHORUS: 4.9 MG/DL (ref 2.5–4.5)
PLATELET # BLD AUTO: 88 10*3/MM3 (ref 140–450)
PLATELET # BLD: 162 K/UL (ref 130–400)
PLATELET # BLD: 162 K/UL (ref 130–400)
PLATELET # BLD: 164 K/UL (ref 130–400)
PLATELET # BLD: 165 K/UL (ref 130–400)
PLATELET # BLD: 166 K/UL (ref 130–400)
PLATELET # BLD: 168 K/UL (ref 130–400)
PLATELET # BLD: 172 K/UL (ref 130–400)
PLATELET # BLD: 174 K/UL (ref 130–400)
PLATELET # BLD: 175 K/UL (ref 130–400)
PLATELET # BLD: 182 K/UL (ref 130–400)
PLATELET SLIDE REVIEW: ADEQUATE
PMV BLD AUTO: 10.3 FL (ref 9.4–12.3)
PMV BLD AUTO: 11 FL (ref 6–12)
PMV BLD AUTO: 8.8 FL (ref 9.4–12.3)
PMV BLD AUTO: 9.1 FL (ref 9.4–12.3)
PMV BLD AUTO: 9.3 FL (ref 9.4–12.3)
PMV BLD AUTO: 9.3 FL (ref 9.4–12.3)
PMV BLD AUTO: 9.4 FL (ref 9.4–12.3)
PMV BLD AUTO: 9.5 FL (ref 9.4–12.3)
PMV BLD AUTO: 9.6 FL (ref 9.4–12.3)
PMV BLD AUTO: 9.7 FL (ref 9.4–12.3)
PMV BLD AUTO: 9.9 FL (ref 9.4–12.3)
PO2 BLDA: 63.7 MM HG (ref 83–108)
PO2 TEMP ADJ BLD: 49.8 MM HG (ref 83–108)
POIKILOCYTES: ABNORMAL
POLYCHROMASIA: ABNORMAL
POSITIVE CONTROL: POSITIVE
POTASSIUM SERPL-SCNC: 2.4 MMOL/L (ref 3.5–5)
POTASSIUM SERPL-SCNC: 2.5 MMOL/L (ref 3.5–5)
POTASSIUM SERPL-SCNC: 2.6 MMOL/L (ref 3.5–5)
POTASSIUM SERPL-SCNC: 3.2 MMOL/L (ref 3.5–5)
POTASSIUM SERPL-SCNC: 3.2 MMOL/L (ref 3.5–5)
POTASSIUM SERPL-SCNC: 3.4 MMOL/L (ref 3.5–5)
POTASSIUM SERPL-SCNC: 3.6 MMOL/L (ref 3.5–5)
POTASSIUM SERPL-SCNC: 4.3 MMOL/L (ref 3.5–5.2)
POTASSIUM SERPL-SCNC: 4.6 MMOL/L (ref 3.5–5)
PRO-BNP: 1866 PG/ML (ref 0–1800)
PROCALCITONIN SERPL-MCNC: 0.23 NG/ML (ref 0–0.25)
PROCALCITONIN: 0.67 NG/ML (ref 0–0.09)
PROT SERPL-MCNC: 4.8 G/DL (ref 6–8.5)
PROT UR QL STRIP: NEGATIVE
PROTEIN UA: 30 MG/DL
PROTHROMBIN TIME: 13.5 SECONDS (ref 11.9–14.6)
PROTHROMBIN TIME: 15.1 SEC (ref 12–14.6)
PROTHROMBIN TIME: 19.3 SEC (ref 12–14.6)
PROTHROMBIN TIME: 33.7 SEC (ref 12–14.6)
PROTHROMBIN TIME: >120 SEC (ref 12–14.6)
RBC # BLD AUTO: 3.29 10*6/MM3 (ref 3.77–5.28)
RBC # BLD: 3.14 M/UL (ref 4.2–5.4)
RBC # BLD: 3.15 M/UL (ref 4.2–5.4)
RBC # BLD: 3.18 M/UL (ref 4.2–5.4)
RBC # BLD: 3.26 M/UL (ref 4.2–5.4)
RBC # BLD: 3.31 M/UL (ref 4.2–5.4)
RBC # BLD: 3.35 M/UL (ref 4.2–5.4)
RBC # BLD: 3.39 M/UL (ref 4.2–5.4)
RBC # BLD: 3.65 M/UL (ref 4.2–5.4)
RBC # BLD: 3.79 M/UL (ref 4.2–5.4)
RBC # BLD: 3.93 M/UL (ref 4.2–5.4)
RBC # UR: ABNORMAL /HPF
RBC UA: ABNORMAL /HPF (ref 0–2)
REF LAB TEST METHOD: ABNORMAL
RENAL EPI CELLS #/AREA URNS HPF: ABNORMAL /HPF
RH BLD: POSITIVE
SAO2 % BLDCOA: 88.2 % (ref 94–99)
SARS-COV-2 RNA PNL SPEC NAA+PROBE: NOT DETECTED
SARS-COV-2, NAAT: NOT DETECTED
SODIUM BLD-SCNC: 133 MMOL/L (ref 136–145)
SODIUM BLD-SCNC: 134 MMOL/L (ref 136–145)
SODIUM BLD-SCNC: 134 MMOL/L (ref 136–145)
SODIUM BLD-SCNC: 136 MMOL/L (ref 136–145)
SODIUM BLD-SCNC: 140 MMOL/L (ref 136–145)
SODIUM BLD-SCNC: 142 MMOL/L (ref 136–145)
SODIUM SERPL-SCNC: 136 MMOL/L (ref 136–145)
SP GR UR STRIP: >=1.03 (ref 1–1.03)
SPECIFIC GRAVITY UA: 1.01 (ref 1–1.03)
SQUAMOUS #/AREA URNS HPF: ABNORMAL /HPF
T&S EXPIRATION DATE: NORMAL
TOTAL PROTEIN: 4.7 G/DL (ref 6.6–8.7)
TOTAL PROTEIN: 4.7 G/DL (ref 6.6–8.7)
TOTAL PROTEIN: 4.8 G/DL (ref 6.6–8.7)
TROPONIN T SERPL-MCNC: 0.05 NG/ML (ref 0–0.03)
TROPONIN T SERPL-MCNC: 0.06 NG/ML (ref 0–0.03)
TROPONIN: 0.06 NG/ML (ref 0–0.03)
TROPONIN: 0.06 NG/ML (ref 0–0.03)
TROPONIN: 0.08 NG/ML (ref 0–0.03)
TSH REFLEX FT4: 3.86 UIU/ML (ref 0.35–5.5)
URINE CULTURE, ROUTINE: ABNORMAL
UROBILINOGEN UR QL STRIP: ABNORMAL
UROBILINOGEN, URINE: 0.2 E.U./DL
VANCOMYCIN RANDOM: 9.4 UG/ML
VENTILATOR MODE: ABNORMAL
WBC # BLD AUTO: 7.86 10*3/MM3 (ref 3.4–10.8)
WBC # BLD: 3.4 K/UL (ref 4.8–10.8)
WBC # BLD: 3.5 K/UL (ref 4.8–10.8)
WBC # BLD: 3.7 K/UL (ref 4.8–10.8)
WBC # BLD: 3.8 K/UL (ref 4.8–10.8)
WBC # BLD: 3.8 K/UL (ref 4.8–10.8)
WBC # BLD: 4.4 K/UL (ref 4.8–10.8)
WBC # BLD: 4.5 K/UL (ref 4.8–10.8)
WBC # BLD: 5.3 K/UL (ref 4.8–10.8)
WBC UA: ABNORMAL /HPF (ref 0–5)
WBC UR QL AUTO: ABNORMAL /HPF
WHOLE BLOOD HOLD SPECIMEN: NORMAL
WHOLE BLOOD HOLD SPECIMEN: NORMAL
YEAST URNS QL MICRO: ABNORMAL /HPF

## 2021-01-01 PROCEDURE — 85610 PROTHROMBIN TIME: CPT

## 2021-01-01 PROCEDURE — 85027 COMPLETE CBC AUTOMATED: CPT

## 2021-01-01 PROCEDURE — 86900 BLOOD TYPING SEROLOGIC ABO: CPT | Performed by: PHYSICIAN ASSISTANT

## 2021-01-01 PROCEDURE — 97166 OT EVAL MOD COMPLEX 45 MIN: CPT

## 2021-01-01 PROCEDURE — G8482 FLU IMMUNIZE ORDER/ADMIN: HCPCS | Performed by: NURSE PRACTITIONER

## 2021-01-01 PROCEDURE — 82947 ASSAY GLUCOSE BLOOD QUANT: CPT

## 2021-01-01 PROCEDURE — 99285 EMERGENCY DEPT VISIT HI MDM: CPT

## 2021-01-01 PROCEDURE — 83735 ASSAY OF MAGNESIUM: CPT

## 2021-01-01 PROCEDURE — 2500000003 HC RX 250 WO HCPCS: Performed by: ANESTHESIOLOGY

## 2021-01-01 PROCEDURE — 1036F TOBACCO NON-USER: CPT | Performed by: NURSE PRACTITIONER

## 2021-01-01 PROCEDURE — 82270 OCCULT BLOOD FECES: CPT | Performed by: FAMILY MEDICINE

## 2021-01-01 PROCEDURE — 51702 INSERT TEMP BLADDER CATH: CPT

## 2021-01-01 PROCEDURE — 97161 PT EVAL LOW COMPLEX 20 MIN: CPT

## 2021-01-01 PROCEDURE — 83690 ASSAY OF LIPASE: CPT

## 2021-01-01 PROCEDURE — 36415 COLL VENOUS BLD VENIPUNCTURE: CPT

## 2021-01-01 PROCEDURE — 7100000000 HC PACU RECOVERY - FIRST 15 MIN: Performed by: INTERNAL MEDICINE

## 2021-01-01 PROCEDURE — 25010000002 VANCOMYCIN 10 G RECONSTITUTED SOLUTION: Performed by: PHYSICIAN ASSISTANT

## 2021-01-01 PROCEDURE — 85025 COMPLETE CBC W/AUTO DIFF WBC: CPT

## 2021-01-01 PROCEDURE — 80048 BASIC METABOLIC PNL TOTAL CA: CPT

## 2021-01-01 PROCEDURE — 83605 ASSAY OF LACTIC ACID: CPT

## 2021-01-01 PROCEDURE — 80053 COMPREHEN METABOLIC PANEL: CPT

## 2021-01-01 PROCEDURE — 87635 SARS-COV-2 COVID-19 AMP PRB: CPT | Performed by: PHYSICIAN ASSISTANT

## 2021-01-01 PROCEDURE — 84100 ASSAY OF PHOSPHORUS: CPT | Performed by: PHYSICIAN ASSISTANT

## 2021-01-01 PROCEDURE — 3609010600 HC COLONOSCOPY POLYPECTOMY SNARE/COLD BIOPSY: Performed by: INTERNAL MEDICINE

## 2021-01-01 PROCEDURE — 80053 COMPREHEN METABOLIC PANEL: CPT | Performed by: PHYSICIAN ASSISTANT

## 2021-01-01 PROCEDURE — 6360000002 HC RX W HCPCS: Performed by: EMERGENCY MEDICINE

## 2021-01-01 PROCEDURE — 6360000002 HC RX W HCPCS: Performed by: INTERNAL MEDICINE

## 2021-01-01 PROCEDURE — 96365 THER/PROPH/DIAG IV INF INIT: CPT

## 2021-01-01 PROCEDURE — 2580000003 HC RX 258: Performed by: STUDENT IN AN ORGANIZED HEALTH CARE EDUCATION/TRAINING PROGRAM

## 2021-01-01 PROCEDURE — 1090F PRES/ABSN URINE INCON ASSESS: CPT | Performed by: NURSE PRACTITIONER

## 2021-01-01 PROCEDURE — 82040 ASSAY OF SERUM ALBUMIN: CPT

## 2021-01-01 PROCEDURE — 93010 ELECTROCARDIOGRAM REPORT: CPT | Performed by: INTERNAL MEDICINE

## 2021-01-01 PROCEDURE — 97530 THERAPEUTIC ACTIVITIES: CPT

## 2021-01-01 PROCEDURE — 6360000002 HC RX W HCPCS: Performed by: STUDENT IN AN ORGANIZED HEALTH CARE EDUCATION/TRAINING PROGRAM

## 2021-01-01 PROCEDURE — 86850 RBC ANTIBODY SCREEN: CPT | Performed by: PHYSICIAN ASSISTANT

## 2021-01-01 PROCEDURE — 25010000002 MORPHINE (PF) 10 MG/ML SOLUTION: Performed by: FAMILY MEDICINE

## 2021-01-01 PROCEDURE — 1210000000 HC MED SURG R&B

## 2021-01-01 PROCEDURE — 3609017100 HC EGD: Performed by: INTERNAL MEDICINE

## 2021-01-01 PROCEDURE — 82330 ASSAY OF CALCIUM: CPT

## 2021-01-01 PROCEDURE — G8428 CUR MEDS NOT DOCUMENT: HCPCS | Performed by: NURSE PRACTITIONER

## 2021-01-01 PROCEDURE — 93005 ELECTROCARDIOGRAM TRACING: CPT

## 2021-01-01 PROCEDURE — 0DBK8ZX EXCISION OF ASCENDING COLON, VIA NATURAL OR ARTIFICIAL OPENING ENDOSCOPIC, DIAGNOSTIC: ICD-10-PCS | Performed by: INTERNAL MEDICINE

## 2021-01-01 PROCEDURE — 83880 ASSAY OF NATRIURETIC PEPTIDE: CPT | Performed by: PHYSICIAN ASSISTANT

## 2021-01-01 PROCEDURE — 84132 ASSAY OF SERUM POTASSIUM: CPT

## 2021-01-01 PROCEDURE — 85025 COMPLETE CBC W/AUTO DIFF WBC: CPT | Performed by: PHYSICIAN ASSISTANT

## 2021-01-01 PROCEDURE — 1111F DSCHRG MED/CURRENT MED MERGE: CPT | Performed by: INTERNAL MEDICINE

## 2021-01-01 PROCEDURE — 6370000000 HC RX 637 (ALT 250 FOR IP): Performed by: INTERNAL MEDICINE

## 2021-01-01 PROCEDURE — 36600 WITHDRAWAL OF ARTERIAL BLOOD: CPT

## 2021-01-01 PROCEDURE — 97535 SELF CARE MNGMENT TRAINING: CPT

## 2021-01-01 PROCEDURE — 83880 ASSAY OF NATRIURETIC PEPTIDE: CPT

## 2021-01-01 PROCEDURE — 96366 THER/PROPH/DIAG IV INF ADDON: CPT

## 2021-01-01 PROCEDURE — 87077 CULTURE AEROBIC IDENTIFY: CPT

## 2021-01-01 PROCEDURE — 1111F DSCHRG MED/CURRENT MED MERGE: CPT | Performed by: NURSE PRACTITIONER

## 2021-01-01 PROCEDURE — 74176 CT ABD & PELVIS W/O CONTRAST: CPT

## 2021-01-01 PROCEDURE — 82270 OCCULT BLOOD FECES: CPT | Performed by: PHYSICIAN ASSISTANT

## 2021-01-01 PROCEDURE — 94660 CPAP INITIATION&MGMT: CPT

## 2021-01-01 PROCEDURE — 86140 C-REACTIVE PROTEIN: CPT | Performed by: PHYSICIAN ASSISTANT

## 2021-01-01 PROCEDURE — 84443 ASSAY THYROID STIM HORMONE: CPT

## 2021-01-01 PROCEDURE — 84145 PROCALCITONIN (PCT): CPT

## 2021-01-01 PROCEDURE — 2580000003 HC RX 258: Performed by: INTERNAL MEDICINE

## 2021-01-01 PROCEDURE — 99496 TRANSJ CARE MGMT HIGH F2F 7D: CPT | Performed by: INTERNAL MEDICINE

## 2021-01-01 PROCEDURE — 6370000000 HC RX 637 (ALT 250 FOR IP): Performed by: STUDENT IN AN ORGANIZED HEALTH CARE EDUCATION/TRAINING PROGRAM

## 2021-01-01 PROCEDURE — 1123F ACP DISCUSS/DSCN MKR DOCD: CPT | Performed by: NURSE PRACTITIONER

## 2021-01-01 PROCEDURE — 2500000003 HC RX 250 WO HCPCS: Performed by: HOSPITALIST

## 2021-01-01 PROCEDURE — 3700000001 HC ADD 15 MINUTES (ANESTHESIA): Performed by: INTERNAL MEDICINE

## 2021-01-01 PROCEDURE — 2580000003 HC RX 258: Performed by: HOSPITALIST

## 2021-01-01 PROCEDURE — 86850 RBC ANTIBODY SCREEN: CPT

## 2021-01-01 PROCEDURE — 0DBE8ZX EXCISION OF LARGE INTESTINE, VIA NATURAL OR ARTIFICIAL OPENING ENDOSCOPIC, DIAGNOSTIC: ICD-10-PCS | Performed by: INTERNAL MEDICINE

## 2021-01-01 PROCEDURE — 96375 TX/PRO/DX INJ NEW DRUG ADDON: CPT

## 2021-01-01 PROCEDURE — 84100 ASSAY OF PHOSPHORUS: CPT

## 2021-01-01 PROCEDURE — 2500000003 HC RX 250 WO HCPCS: Performed by: STUDENT IN AN ORGANIZED HEALTH CARE EDUCATION/TRAINING PROGRAM

## 2021-01-01 PROCEDURE — 0DB98ZX EXCISION OF DUODENUM, VIA NATURAL OR ARTIFICIAL OPENING ENDOSCOPIC, DIAGNOSTIC: ICD-10-PCS | Performed by: INTERNAL MEDICINE

## 2021-01-01 PROCEDURE — 93005 ELECTROCARDIOGRAM TRACING: CPT | Performed by: PHYSICIAN ASSISTANT

## 2021-01-01 PROCEDURE — 71045 X-RAY EXAM CHEST 1 VIEW: CPT

## 2021-01-01 PROCEDURE — 80202 ASSAY OF VANCOMYCIN: CPT

## 2021-01-01 PROCEDURE — 45384 COLONOSCOPY W/LESION REMOVAL: CPT | Performed by: INTERNAL MEDICINE

## 2021-01-01 PROCEDURE — 86900 BLOOD TYPING SEROLOGIC ABO: CPT

## 2021-01-01 PROCEDURE — 86901 BLOOD TYPING SEROLOGIC RH(D): CPT

## 2021-01-01 PROCEDURE — 84145 PROCALCITONIN (PCT): CPT | Performed by: PHYSICIAN ASSISTANT

## 2021-01-01 PROCEDURE — G8417 CALC BMI ABV UP PARAM F/U: HCPCS | Performed by: NURSE PRACTITIONER

## 2021-01-01 PROCEDURE — C9113 INJ PANTOPRAZOLE SODIUM, VIA: HCPCS | Performed by: STUDENT IN AN ORGANIZED HEALTH CARE EDUCATION/TRAINING PROGRAM

## 2021-01-01 PROCEDURE — 96376 TX/PRO/DX INJ SAME DRUG ADON: CPT

## 2021-01-01 PROCEDURE — 36430 TRANSFUSION BLD/BLD COMPNT: CPT

## 2021-01-01 PROCEDURE — C9113 INJ PANTOPRAZOLE SODIUM, VIA: HCPCS | Performed by: EMERGENCY MEDICINE

## 2021-01-01 PROCEDURE — 97116 GAIT TRAINING THERAPY: CPT

## 2021-01-01 PROCEDURE — 99233 SBSQ HOSP IP/OBS HIGH 50: CPT | Performed by: INTERNAL MEDICINE

## 2021-01-01 PROCEDURE — 2700000000 HC OXYGEN THERAPY PER DAY

## 2021-01-01 PROCEDURE — U0002 COVID-19 LAB TEST NON-CDC: HCPCS

## 2021-01-01 PROCEDURE — 2709999900 HC NON-CHARGEABLE SUPPLY: Performed by: INTERNAL MEDICINE

## 2021-01-01 PROCEDURE — 92526 ORAL FUNCTION THERAPY: CPT

## 2021-01-01 PROCEDURE — 4040F PNEUMOC VAC/ADMIN/RCVD: CPT | Performed by: NURSE PRACTITIONER

## 2021-01-01 PROCEDURE — 83735 ASSAY OF MAGNESIUM: CPT | Performed by: PHYSICIAN ASSISTANT

## 2021-01-01 PROCEDURE — 81001 URINALYSIS AUTO W/SCOPE: CPT

## 2021-01-01 PROCEDURE — 99222 1ST HOSP IP/OBS MODERATE 55: CPT | Performed by: INTERNAL MEDICINE

## 2021-01-01 PROCEDURE — 84484 ASSAY OF TROPONIN QUANT: CPT | Performed by: PHYSICIAN ASSISTANT

## 2021-01-01 PROCEDURE — 87040 BLOOD CULTURE FOR BACTERIA: CPT | Performed by: PHYSICIAN ASSISTANT

## 2021-01-01 PROCEDURE — 87040 BLOOD CULTURE FOR BACTERIA: CPT

## 2021-01-01 PROCEDURE — 43239 EGD BIOPSY SINGLE/MULTIPLE: CPT | Performed by: INTERNAL MEDICINE

## 2021-01-01 PROCEDURE — 82803 BLOOD GASES ANY COMBINATION: CPT

## 2021-01-01 PROCEDURE — 81001 URINALYSIS AUTO W/SCOPE: CPT | Performed by: PHYSICIAN ASSISTANT

## 2021-01-01 PROCEDURE — 85610 PROTHROMBIN TIME: CPT | Performed by: PHYSICIAN ASSISTANT

## 2021-01-01 PROCEDURE — 99203 OFFICE O/P NEW LOW 30 MIN: CPT | Performed by: NURSE PRACTITIONER

## 2021-01-01 PROCEDURE — 45380 COLONOSCOPY AND BIOPSY: CPT | Performed by: INTERNAL MEDICINE

## 2021-01-01 PROCEDURE — 92610 EVALUATE SWALLOWING FUNCTION: CPT

## 2021-01-01 PROCEDURE — 25010000002 PIPERACILLIN SOD-TAZOBACTAM PER 1 G: Performed by: PHYSICIAN ASSISTANT

## 2021-01-01 PROCEDURE — 7100000001 HC PACU RECOVERY - ADDTL 15 MIN: Performed by: INTERNAL MEDICINE

## 2021-01-01 PROCEDURE — 87186 SC STD MICRODIL/AGAR DIL: CPT

## 2021-01-01 PROCEDURE — 93005 ELECTROCARDIOGRAM TRACING: CPT | Performed by: FAMILY MEDICINE

## 2021-01-01 PROCEDURE — 3700000000 HC ANESTHESIA ATTENDED CARE: Performed by: INTERNAL MEDICINE

## 2021-01-01 PROCEDURE — 93005 ELECTROCARDIOGRAM TRACING: CPT | Performed by: EMERGENCY MEDICINE

## 2021-01-01 PROCEDURE — 87086 URINE CULTURE/COLONY COUNT: CPT

## 2021-01-01 PROCEDURE — 85730 THROMBOPLASTIN TIME PARTIAL: CPT

## 2021-01-01 PROCEDURE — 6360000002 HC RX W HCPCS: Performed by: ANESTHESIOLOGY

## 2021-01-01 PROCEDURE — 84484 ASSAY OF TROPONIN QUANT: CPT

## 2021-01-01 PROCEDURE — 83605 ASSAY OF LACTIC ACID: CPT | Performed by: PHYSICIAN ASSISTANT

## 2021-01-01 PROCEDURE — 85730 THROMBOPLASTIN TIME PARTIAL: CPT | Performed by: PHYSICIAN ASSISTANT

## 2021-01-01 PROCEDURE — 86901 BLOOD TYPING SEROLOGIC RH(D): CPT | Performed by: PHYSICIAN ASSISTANT

## 2021-01-01 PROCEDURE — P9059 PLASMA, FRZ BETWEEN 8-24HOUR: HCPCS

## 2021-01-01 PROCEDURE — 2580000003 HC RX 258: Performed by: EMERGENCY MEDICINE

## 2021-01-01 PROCEDURE — 2000000000 HC ICU R&B

## 2021-01-01 PROCEDURE — P9017 PLASMA 1 DONOR FRZ W/IN 8 HR: HCPCS

## 2021-01-01 RX ORDER — ACETAMINOPHEN 650 MG/1
650 SUPPOSITORY RECTAL EVERY 4 HOURS PRN
Status: DISCONTINUED | OUTPATIENT
Start: 2021-01-01 | End: 2021-01-25 | Stop reason: HOSPADM

## 2021-01-01 RX ORDER — LORAZEPAM 1 MG/1
2 TABLET ORAL
Status: DISCONTINUED | OUTPATIENT
Start: 2021-01-01 | End: 2021-01-25 | Stop reason: HOSPADM

## 2021-01-01 RX ORDER — MORPHINE SULFATE 2 MG/ML
2 INJECTION, SOLUTION INTRAMUSCULAR; INTRAVENOUS
Status: DISCONTINUED | OUTPATIENT
Start: 2021-01-01 | End: 2021-01-25 | Stop reason: HOSPADM

## 2021-01-01 RX ORDER — LORAZEPAM 2 MG/ML
2 INJECTION INTRAMUSCULAR
Status: DISCONTINUED | OUTPATIENT
Start: 2021-01-01 | End: 2021-01-25 | Stop reason: HOSPADM

## 2021-01-01 RX ORDER — PANTOPRAZOLE SODIUM 40 MG/10ML
80 INJECTION, POWDER, LYOPHILIZED, FOR SOLUTION INTRAVENOUS ONCE
Status: COMPLETED | OUTPATIENT
Start: 2021-01-01 | End: 2021-01-01

## 2021-01-01 RX ORDER — SODIUM CHLORIDE 9 MG/ML
10 INJECTION INTRAVENOUS 2 TIMES DAILY
Status: DISCONTINUED | OUTPATIENT
Start: 2021-01-01 | End: 2021-01-01 | Stop reason: HOSPADM

## 2021-01-01 RX ORDER — LEVOTHYROXINE SODIUM ANHYDROUS 100 UG/5ML
37.5 INJECTION, POWDER, LYOPHILIZED, FOR SOLUTION INTRAVENOUS DAILY
Status: DISCONTINUED | OUTPATIENT
Start: 2021-01-01 | End: 2021-01-01

## 2021-01-01 RX ORDER — LORAZEPAM 0.5 MG/1
0.5 TABLET ORAL
Status: DISCONTINUED | OUTPATIENT
Start: 2021-01-01 | End: 2021-01-25 | Stop reason: HOSPADM

## 2021-01-01 RX ORDER — SODIUM CHLORIDE 0.9 % (FLUSH) 0.9 %
10 SYRINGE (ML) INJECTION EVERY 12 HOURS SCHEDULED
Status: DISCONTINUED | OUTPATIENT
Start: 2021-01-01 | End: 2021-01-25 | Stop reason: HOSPADM

## 2021-01-01 RX ORDER — SODIUM CHLORIDE 0.9 % (FLUSH) 0.9 %
10 SYRINGE (ML) INJECTION AS NEEDED
Status: DISCONTINUED | OUTPATIENT
Start: 2021-01-01 | End: 2021-01-25 | Stop reason: HOSPADM

## 2021-01-01 RX ORDER — MONTELUKAST SODIUM 4 MG/1
1 TABLET, CHEWABLE ORAL 2 TIMES DAILY
Status: DISCONTINUED | OUTPATIENT
Start: 2021-01-01 | End: 2021-01-01 | Stop reason: CLARIF

## 2021-01-01 RX ORDER — LORAZEPAM 1 MG/1
1 TABLET ORAL
Status: DISCONTINUED | OUTPATIENT
Start: 2021-01-01 | End: 2021-01-25 | Stop reason: HOSPADM

## 2021-01-01 RX ORDER — METRONIDAZOLE 500 MG/1
500 TABLET ORAL 3 TIMES DAILY
Qty: 15 TABLET | Refills: 0 | Status: SHIPPED | OUTPATIENT
Start: 2021-01-01 | End: 2021-01-01

## 2021-01-01 RX ORDER — POTASSIUM CHLORIDE 20 MEQ/1
20 TABLET, EXTENDED RELEASE ORAL ONCE
Status: DISCONTINUED | OUTPATIENT
Start: 2021-01-01 | End: 2021-01-01 | Stop reason: HOSPADM

## 2021-01-01 RX ORDER — DIPHENOXYLATE HYDROCHLORIDE AND ATROPINE SULFATE 2.5; .025 MG/1; MG/1
1 TABLET ORAL
Status: DISCONTINUED | OUTPATIENT
Start: 2021-01-01 | End: 2021-01-25 | Stop reason: HOSPADM

## 2021-01-01 RX ORDER — LOPERAMIDE HYDROCHLORIDE 2 MG/1
2 CAPSULE ORAL 4 TIMES DAILY PRN
Qty: 40 CAPSULE | Refills: 0 | Status: ON HOLD | OUTPATIENT
Start: 2021-01-01 | End: 2021-01-01 | Stop reason: HOSPADM

## 2021-01-01 RX ORDER — ATROPINE SULFATE 10 MG/ML
2 SOLUTION/ DROPS OPHTHALMIC 4 TIMES DAILY
Status: DISCONTINUED | OUTPATIENT
Start: 2021-01-01 | End: 2021-01-25 | Stop reason: HOSPADM

## 2021-01-01 RX ORDER — LEVOTHYROXINE SODIUM 0.07 MG/1
75 TABLET ORAL DAILY
Status: DISCONTINUED | OUTPATIENT
Start: 2021-01-01 | End: 2021-01-01 | Stop reason: HOSPADM

## 2021-01-01 RX ORDER — LORAZEPAM 2 MG/ML
0.5 CONCENTRATE ORAL
Status: DISCONTINUED | OUTPATIENT
Start: 2021-01-01 | End: 2021-01-25 | Stop reason: HOSPADM

## 2021-01-01 RX ORDER — MORPHINE SULFATE 1 MG/ML
2 INJECTION, SOLUTION INTRAVENOUS CONTINUOUS
Status: DISCONTINUED | OUTPATIENT
Start: 2021-01-01 | End: 2021-01-25 | Stop reason: HOSPADM

## 2021-01-01 RX ORDER — CHOLESTYRAMINE 4 G/9G
1 POWDER, FOR SUSPENSION ORAL 2 TIMES DAILY
Status: DISCONTINUED | OUTPATIENT
Start: 2021-01-01 | End: 2021-01-01 | Stop reason: HOSPADM

## 2021-01-01 RX ORDER — LORAZEPAM 2 MG/ML
1 INJECTION INTRAMUSCULAR
Status: DISCONTINUED | OUTPATIENT
Start: 2021-01-01 | End: 2021-01-25 | Stop reason: HOSPADM

## 2021-01-01 RX ORDER — PANTOPRAZOLE SODIUM 40 MG/10ML
40 INJECTION, POWDER, LYOPHILIZED, FOR SOLUTION INTRAVENOUS 2 TIMES DAILY
Status: DISCONTINUED | OUTPATIENT
Start: 2021-01-01 | End: 2021-01-01 | Stop reason: HOSPADM

## 2021-01-01 RX ORDER — LORAZEPAM 2 MG/ML
2 CONCENTRATE ORAL
Status: DISCONTINUED | OUTPATIENT
Start: 2021-01-01 | End: 2021-01-25 | Stop reason: HOSPADM

## 2021-01-01 RX ORDER — SODIUM CHLORIDE, SODIUM LACTATE, POTASSIUM CHLORIDE, AND CALCIUM CHLORIDE .6; .31; .03; .02 G/100ML; G/100ML; G/100ML; G/100ML
1000 INJECTION, SOLUTION INTRAVENOUS ONCE
Status: COMPLETED | OUTPATIENT
Start: 2021-01-01 | End: 2021-01-01

## 2021-01-01 RX ORDER — POTASSIUM CHLORIDE 7.45 MG/ML
10 INJECTION INTRAVENOUS ONCE
Status: COMPLETED | OUTPATIENT
Start: 2021-01-01 | End: 2021-01-01

## 2021-01-01 RX ORDER — POTASSIUM CHLORIDE 7.45 MG/ML
80 INJECTION INTRAVENOUS ONCE
Status: DISCONTINUED | OUTPATIENT
Start: 2021-01-01 | End: 2021-01-01

## 2021-01-01 RX ORDER — ONDANSETRON 2 MG/ML
4 INJECTION INTRAMUSCULAR; INTRAVENOUS ONCE
Status: COMPLETED | OUTPATIENT
Start: 2021-01-01 | End: 2021-01-01

## 2021-01-01 RX ORDER — LORAZEPAM 2 MG/ML
0.5 INJECTION INTRAMUSCULAR
Status: DISCONTINUED | OUTPATIENT
Start: 2021-01-01 | End: 2021-01-25 | Stop reason: HOSPADM

## 2021-01-01 RX ORDER — SODIUM CHLORIDE 0.9 % (FLUSH) 0.9 %
10 SYRINGE (ML) INJECTION EVERY 12 HOURS SCHEDULED
Status: DISCONTINUED | OUTPATIENT
Start: 2021-01-01 | End: 2021-01-01 | Stop reason: HOSPADM

## 2021-01-01 RX ORDER — LIDOCAINE HYDROCHLORIDE 10 MG/ML
INJECTION, SOLUTION INFILTRATION; PERINEURAL PRN
Status: DISCONTINUED | OUTPATIENT
Start: 2021-01-01 | End: 2021-01-01 | Stop reason: SDUPTHER

## 2021-01-01 RX ORDER — NALOXONE HCL 0.4 MG/ML
0.4 VIAL (ML) INJECTION
Status: DISCONTINUED | OUTPATIENT
Start: 2021-01-01 | End: 2021-01-25 | Stop reason: HOSPADM

## 2021-01-01 RX ORDER — SODIUM CHLORIDE 9 MG/ML
INJECTION, SOLUTION INTRAVENOUS PRN
Status: DISCONTINUED | OUTPATIENT
Start: 2021-01-01 | End: 2021-01-01 | Stop reason: HOSPADM

## 2021-01-01 RX ORDER — LOPERAMIDE HYDROCHLORIDE 2 MG/1
2 CAPSULE ORAL 4 TIMES DAILY PRN
Status: DISCONTINUED | OUTPATIENT
Start: 2021-01-01 | End: 2021-01-01 | Stop reason: HOSPADM

## 2021-01-01 RX ORDER — PANTOPRAZOLE SODIUM 40 MG/1
40 TABLET, DELAYED RELEASE ORAL
Qty: 60 TABLET | Refills: 1 | Status: SHIPPED | OUTPATIENT
Start: 2021-01-01 | End: 2021-02-14

## 2021-01-01 RX ORDER — PROMETHAZINE HYDROCHLORIDE 25 MG/1
12.5 TABLET ORAL EVERY 6 HOURS PRN
Status: DISCONTINUED | OUTPATIENT
Start: 2021-01-01 | End: 2021-01-01 | Stop reason: HOSPADM

## 2021-01-01 RX ORDER — POTASSIUM CHLORIDE 20 MEQ/1
20 TABLET, EXTENDED RELEASE ORAL 2 TIMES DAILY
Qty: 60 TABLET | Refills: 0 | Status: SHIPPED | OUTPATIENT
Start: 2021-01-01 | End: 2021-02-14

## 2021-01-01 RX ORDER — ACETAMINOPHEN 325 MG/1
650 TABLET ORAL EVERY 4 HOURS PRN
Status: DISCONTINUED | OUTPATIENT
Start: 2021-01-01 | End: 2021-01-25 | Stop reason: HOSPADM

## 2021-01-01 RX ORDER — LORAZEPAM 2 MG/ML
1 CONCENTRATE ORAL
Status: DISCONTINUED | OUTPATIENT
Start: 2021-01-01 | End: 2021-01-25 | Stop reason: HOSPADM

## 2021-01-01 RX ORDER — SODIUM CHLORIDE 9 MG/ML
INJECTION, SOLUTION INTRAVENOUS PRN
Status: DISCONTINUED | OUTPATIENT
Start: 2021-01-01 | End: 2021-01-01 | Stop reason: SDUPTHER

## 2021-01-01 RX ORDER — SODIUM CHLORIDE 9 MG/ML
125 INJECTION, SOLUTION INTRAVENOUS CONTINUOUS
Status: DISCONTINUED | OUTPATIENT
Start: 2021-01-01 | End: 2021-01-01

## 2021-01-01 RX ORDER — SODIUM CHLORIDE, SODIUM LACTATE, POTASSIUM CHLORIDE, CALCIUM CHLORIDE 600; 310; 30; 20 MG/100ML; MG/100ML; MG/100ML; MG/100ML
INJECTION, SOLUTION INTRAVENOUS CONTINUOUS
Status: DISCONTINUED | OUTPATIENT
Start: 2021-01-01 | End: 2021-01-01 | Stop reason: HOSPADM

## 2021-01-01 RX ORDER — ONDANSETRON 2 MG/ML
4 INJECTION INTRAMUSCULAR; INTRAVENOUS EVERY 6 HOURS PRN
Status: DISCONTINUED | OUTPATIENT
Start: 2021-01-01 | End: 2021-01-01 | Stop reason: HOSPADM

## 2021-01-01 RX ORDER — POTASSIUM CHLORIDE 20 MEQ/1
20 TABLET, EXTENDED RELEASE ORAL ONCE
Status: COMPLETED | OUTPATIENT
Start: 2021-01-01 | End: 2021-01-01

## 2021-01-01 RX ORDER — ACETAMINOPHEN 160 MG/5ML
650 SOLUTION ORAL EVERY 4 HOURS PRN
Status: DISCONTINUED | OUTPATIENT
Start: 2021-01-01 | End: 2021-01-25 | Stop reason: HOSPADM

## 2021-01-01 RX ORDER — FUROSEMIDE 40 MG/1
40 TABLET ORAL DAILY
COMMUNITY
End: 2021-01-01 | Stop reason: ALTCHOICE

## 2021-01-01 RX ORDER — POTASSIUM CHLORIDE 20 MEQ/1
40 TABLET, EXTENDED RELEASE ORAL PRN
Status: DISCONTINUED | OUTPATIENT
Start: 2021-01-01 | End: 2021-01-01 | Stop reason: HOSPADM

## 2021-01-01 RX ORDER — MAGNESIUM SULFATE IN WATER 40 MG/ML
2 INJECTION, SOLUTION INTRAVENOUS ONCE
Status: COMPLETED | OUTPATIENT
Start: 2021-01-01 | End: 2021-01-01

## 2021-01-01 RX ORDER — ACETAMINOPHEN 650 MG/1
650 SUPPOSITORY RECTAL EVERY 6 HOURS PRN
Status: DISCONTINUED | OUTPATIENT
Start: 2021-01-01 | End: 2021-01-01 | Stop reason: HOSPADM

## 2021-01-01 RX ORDER — PROPOFOL 10 MG/ML
INJECTION, EMULSION INTRAVENOUS PRN
Status: DISCONTINUED | OUTPATIENT
Start: 2021-01-01 | End: 2021-01-01 | Stop reason: SDUPTHER

## 2021-01-01 RX ORDER — HALOPERIDOL 5 MG/ML
5 INJECTION INTRAMUSCULAR EVERY 4 HOURS PRN
Status: DISCONTINUED | OUTPATIENT
Start: 2021-01-01 | End: 2021-01-25 | Stop reason: HOSPADM

## 2021-01-01 RX ORDER — MAGNESIUM SULFATE IN WATER 40 MG/ML
2 INJECTION, SOLUTION INTRAVENOUS PRN
Status: DISCONTINUED | OUTPATIENT
Start: 2021-01-01 | End: 2021-01-01 | Stop reason: HOSPADM

## 2021-01-01 RX ORDER — SCOLOPAMINE TRANSDERMAL SYSTEM 1 MG/1
1 PATCH, EXTENDED RELEASE TRANSDERMAL
Status: DISCONTINUED | OUTPATIENT
Start: 2021-01-01 | End: 2021-01-25 | Stop reason: HOSPADM

## 2021-01-01 RX ORDER — POTASSIUM CHLORIDE 7.45 MG/ML
10 INJECTION INTRAVENOUS PRN
Status: DISCONTINUED | OUTPATIENT
Start: 2021-01-01 | End: 2021-01-01 | Stop reason: HOSPADM

## 2021-01-01 RX ORDER — ACETAMINOPHEN 325 MG/1
650 TABLET ORAL EVERY 6 HOURS PRN
Status: DISCONTINUED | OUTPATIENT
Start: 2021-01-01 | End: 2021-01-01 | Stop reason: HOSPADM

## 2021-01-01 RX ORDER — SODIUM CHLORIDE 0.9 % (FLUSH) 0.9 %
10 SYRINGE (ML) INJECTION PRN
Status: DISCONTINUED | OUTPATIENT
Start: 2021-01-01 | End: 2021-01-01 | Stop reason: HOSPADM

## 2021-01-01 RX ORDER — CEFUROXIME AXETIL 500 MG/1
500 TABLET ORAL 2 TIMES DAILY
Qty: 14 TABLET | Refills: 0 | Status: SHIPPED | OUTPATIENT
Start: 2021-01-01 | End: 2021-01-01

## 2021-01-01 RX ORDER — MONTELUKAST SODIUM 4 MG/1
1 TABLET, CHEWABLE ORAL 2 TIMES DAILY
Qty: 60 TABLET | Refills: 0 | Status: SHIPPED | OUTPATIENT
Start: 2021-01-01

## 2021-01-01 RX ORDER — ONDANSETRON 4 MG/1
4 TABLET, FILM COATED ORAL
Qty: 24 TABLET | Refills: 0 | Status: SHIPPED | OUTPATIENT
Start: 2021-01-01

## 2021-01-01 RX ORDER — CIPROFLOXACIN 500 MG/1
500 TABLET, FILM COATED ORAL 2 TIMES DAILY
Qty: 10 TABLET | Refills: 0 | Status: SHIPPED | OUTPATIENT
Start: 2021-01-01 | End: 2021-01-01

## 2021-01-01 RX ORDER — METRONIDAZOLE 250 MG/1
250 TABLET ORAL 3 TIMES DAILY
Qty: 21 TABLET | Refills: 0 | Status: SHIPPED | OUTPATIENT
Start: 2021-01-01 | End: 2021-01-01

## 2021-01-01 RX ADMIN — PANTOPRAZOLE SODIUM 40 MG: 40 INJECTION, POWDER, FOR SOLUTION INTRAVENOUS at 08:44

## 2021-01-01 RX ADMIN — RANOLAZINE 500 MG: 500 TABLET, FILM COATED, EXTENDED RELEASE ORAL at 21:46

## 2021-01-01 RX ADMIN — SODIUM CHLORIDE, POTASSIUM CHLORIDE, SODIUM LACTATE AND CALCIUM CHLORIDE 1000 ML: 600; 310; 30; 20 INJECTION, SOLUTION INTRAVENOUS at 09:24

## 2021-01-01 RX ADMIN — ONDANSETRON HYDROCHLORIDE 4 MG: 2 SOLUTION INTRAMUSCULAR; INTRAVENOUS at 09:24

## 2021-01-01 RX ADMIN — AMLODIPINE BESYLATE 10 MG: 10 TABLET ORAL at 07:44

## 2021-01-01 RX ADMIN — CALCIUM GLUCONATE 2 G: 98 INJECTION, SOLUTION INTRAVENOUS at 19:46

## 2021-01-01 RX ADMIN — Medication 1000 MG: at 06:24

## 2021-01-01 RX ADMIN — SODIUM CHLORIDE, POTASSIUM CHLORIDE, SODIUM LACTATE AND CALCIUM CHLORIDE: 600; 310; 30; 20 INJECTION, SOLUTION INTRAVENOUS at 08:36

## 2021-01-01 RX ADMIN — NOREPINEPHRINE BITARTRATE 0.02 MCG/KG/MIN: 1 INJECTION, SOLUTION, CONCENTRATE INTRAVENOUS at 12:07

## 2021-01-01 RX ADMIN — METRONIDAZOLE 500 MG: 500 TABLET ORAL at 06:28

## 2021-01-01 RX ADMIN — CIPROFLOXACIN 400 MG: 2 INJECTION, SOLUTION INTRAVENOUS at 12:29

## 2021-01-01 RX ADMIN — POTASSIUM CHLORIDE 10 MEQ: 10 INJECTION, SOLUTION INTRAVENOUS at 10:26

## 2021-01-01 RX ADMIN — LEVOTHYROXINE SODIUM 75 MCG: 75 TABLET ORAL at 08:45

## 2021-01-01 RX ADMIN — SODIUM CHLORIDE, PRESERVATIVE FREE 1 G: 5 INJECTION INTRAVENOUS at 08:44

## 2021-01-01 RX ADMIN — POTASSIUM CHLORIDE 10 MEQ: 10 INJECTION, SOLUTION INTRAVENOUS at 10:42

## 2021-01-01 RX ADMIN — POTASSIUM CHLORIDE 10 MEQ: 10 INJECTION, SOLUTION INTRAVENOUS at 12:42

## 2021-01-01 RX ADMIN — DRONEDARONE 400 MG: 400 TABLET, FILM COATED ORAL at 09:33

## 2021-01-01 RX ADMIN — POTASSIUM CHLORIDE 10 MEQ: 10 INJECTION, SOLUTION INTRAVENOUS at 06:24

## 2021-01-01 RX ADMIN — SODIUM CHLORIDE, PRESERVATIVE FREE 10 ML: 5 INJECTION INTRAVENOUS at 08:50

## 2021-01-01 RX ADMIN — VANCOMYCIN HYDROCHLORIDE 1250 MG: 10 INJECTION, POWDER, LYOPHILIZED, FOR SOLUTION INTRAVENOUS at 11:02

## 2021-01-01 RX ADMIN — PRAVASTATIN SODIUM 40 MG: 20 TABLET ORAL at 17:01

## 2021-01-01 RX ADMIN — MORPHINE SULFATE 2 MG/HR: 10 INJECTION, SOLUTION INTRAMUSCULAR; INTRAVENOUS at 17:07

## 2021-01-01 RX ADMIN — CARVEDILOL 25 MG: 25 TABLET, FILM COATED ORAL at 17:01

## 2021-01-01 RX ADMIN — SODIUM CHLORIDE 1914 ML: 9 INJECTION, SOLUTION INTRAVENOUS at 10:47

## 2021-01-01 RX ADMIN — SODIUM CHLORIDE, PRESERVATIVE FREE 10 ML: 5 INJECTION INTRAVENOUS at 09:13

## 2021-01-01 RX ADMIN — CIPROFLOXACIN 400 MG: 2 INJECTION, SOLUTION INTRAVENOUS at 12:50

## 2021-01-01 RX ADMIN — METRONIDAZOLE 500 MG: 500 INJECTION, SOLUTION INTRAVENOUS at 23:05

## 2021-01-01 RX ADMIN — CARVEDILOL 25 MG: 25 TABLET, FILM COATED ORAL at 09:33

## 2021-01-01 RX ADMIN — METRONIDAZOLE 500 MG: 500 INJECTION, SOLUTION INTRAVENOUS at 21:10

## 2021-01-01 RX ADMIN — DRONEDARONE 400 MG: 400 TABLET, FILM COATED ORAL at 17:01

## 2021-01-01 RX ADMIN — POTASSIUM PHOSPHATE, MONOBASIC AND POTASSIUM PHOSPHATE, DIBASIC 30 MMOL: 224; 236 INJECTION, SOLUTION, CONCENTRATE INTRAVENOUS at 17:48

## 2021-01-01 RX ADMIN — SODIUM CHLORIDE, PRESERVATIVE FREE 1 G: 5 INJECTION INTRAVENOUS at 08:51

## 2021-01-01 RX ADMIN — SODIUM CHLORIDE, PRESERVATIVE FREE 10 ML: 5 INJECTION INTRAVENOUS at 19:47

## 2021-01-01 RX ADMIN — LEVOTHYROXINE SODIUM 75 MCG: 75 TABLET ORAL at 09:33

## 2021-01-01 RX ADMIN — POTASSIUM CHLORIDE 20 MEQ: 1500 TABLET, EXTENDED RELEASE ORAL at 08:51

## 2021-01-01 RX ADMIN — CEFEPIME HYDROCHLORIDE 2 G: 2 INJECTION, POWDER, FOR SOLUTION INTRAVENOUS at 10:04

## 2021-01-01 RX ADMIN — POTASSIUM CHLORIDE 10 MEQ: 10 INJECTION, SOLUTION INTRAVENOUS at 05:47

## 2021-01-01 RX ADMIN — DRONEDARONE 400 MG: 400 TABLET, FILM COATED ORAL at 07:44

## 2021-01-01 RX ADMIN — CEFEPIME HYDROCHLORIDE 2 G: 2 INJECTION, POWDER, FOR SOLUTION INTRAVENOUS at 10:42

## 2021-01-01 RX ADMIN — SODIUM CHLORIDE, PRESERVATIVE FREE 10 ML: 5 INJECTION INTRAVENOUS at 23:06

## 2021-01-01 RX ADMIN — METRONIDAZOLE 500 MG: 500 INJECTION, SOLUTION INTRAVENOUS at 21:17

## 2021-01-01 RX ADMIN — POTASSIUM CHLORIDE 10 MEQ: 10 INJECTION, SOLUTION INTRAVENOUS at 07:58

## 2021-01-01 RX ADMIN — METRONIDAZOLE 500 MG: 500 TABLET ORAL at 14:17

## 2021-01-01 RX ADMIN — LIDOCAINE HYDROCHLORIDE 5 ML: 10 INJECTION, SOLUTION INFILTRATION; PERINEURAL at 10:17

## 2021-01-01 RX ADMIN — SODIUM PHOSPHATE 1 ENEMA: 7; 19 ENEMA RECTAL at 07:43

## 2021-01-01 RX ADMIN — POTASSIUM CHLORIDE 10 MEQ: 7.46 INJECTION, SOLUTION INTRAVENOUS at 10:04

## 2021-01-01 RX ADMIN — METRONIDAZOLE 500 MG: 500 INJECTION, SOLUTION INTRAVENOUS at 15:13

## 2021-01-01 RX ADMIN — PANTOPRAZOLE SODIUM 40 MG: 40 INJECTION, POWDER, FOR SOLUTION INTRAVENOUS at 23:05

## 2021-01-01 RX ADMIN — LEVOTHYROXINE SODIUM 75 MCG: 75 TABLET ORAL at 07:43

## 2021-01-01 RX ADMIN — AMLODIPINE BESYLATE 10 MG: 10 TABLET ORAL at 09:33

## 2021-01-01 RX ADMIN — PANTOPRAZOLE SODIUM 40 MG: 40 INJECTION, POWDER, FOR SOLUTION INTRAVENOUS at 21:17

## 2021-01-01 RX ADMIN — POTASSIUM CHLORIDE 10 MEQ: 10 INJECTION, SOLUTION INTRAVENOUS at 13:45

## 2021-01-01 RX ADMIN — CARVEDILOL 25 MG: 25 TABLET, FILM COATED ORAL at 07:44

## 2021-01-01 RX ADMIN — SODIUM CHLORIDE, PRESERVATIVE FREE 10 ML: 5 INJECTION INTRAVENOUS at 07:32

## 2021-01-01 RX ADMIN — SCOPALAMINE 1 PATCH: 1 PATCH, EXTENDED RELEASE TRANSDERMAL at 11:59

## 2021-01-01 RX ADMIN — ATROPINE SULFATE 2 DROP: 10 SOLUTION/ DROPS OPHTHALMIC at 11:59

## 2021-01-01 RX ADMIN — POTASSIUM CHLORIDE 10 MEQ: 10 INJECTION, SOLUTION INTRAVENOUS at 07:31

## 2021-01-01 RX ADMIN — POTASSIUM CHLORIDE 10 MEQ: 10 INJECTION, SOLUTION INTRAVENOUS at 08:36

## 2021-01-01 RX ADMIN — POTASSIUM CHLORIDE 40 MEQ: 20 TABLET, EXTENDED RELEASE ORAL at 07:43

## 2021-01-01 RX ADMIN — PROPOFOL 50 MG: 10 INJECTION, EMULSION INTRAVENOUS at 10:17

## 2021-01-01 RX ADMIN — SODIUM CHLORIDE, PRESERVATIVE FREE 10 ML: 5 INJECTION INTRAVENOUS at 21:46

## 2021-01-01 RX ADMIN — SODIUM CHLORIDE, PRESERVATIVE FREE 10 ML: 5 INJECTION INTRAVENOUS at 09:34

## 2021-01-01 RX ADMIN — LEVOTHYROXINE SODIUM 75 MCG: 75 TABLET ORAL at 08:51

## 2021-01-01 RX ADMIN — PROPOFOL 20 MG: 10 INJECTION, EMULSION INTRAVENOUS at 10:45

## 2021-01-01 RX ADMIN — METRONIDAZOLE 500 MG: 500 INJECTION, SOLUTION INTRAVENOUS at 08:45

## 2021-01-01 RX ADMIN — MAGNESIUM SULFATE IN WATER 2 G: 40 INJECTION, SOLUTION INTRAVENOUS at 13:31

## 2021-01-01 RX ADMIN — NOREPINEPHRINE BITARTRATE 0.06 MCG/KG/MIN: 1 INJECTION, SOLUTION, CONCENTRATE INTRAVENOUS at 12:54

## 2021-01-01 RX ADMIN — POTASSIUM PHOSPHATE, MONOBASIC AND POTASSIUM PHOSPHATE, DIBASIC 30 MMOL: 224; 236 INJECTION, SOLUTION, CONCENTRATE INTRAVENOUS at 11:07

## 2021-01-01 RX ADMIN — PANTOPRAZOLE SODIUM 40 MG: 40 INJECTION, POWDER, FOR SOLUTION INTRAVENOUS at 08:51

## 2021-01-01 RX ADMIN — METRONIDAZOLE 500 MG: 500 TABLET ORAL at 15:07

## 2021-01-01 RX ADMIN — PANTOPRAZOLE SODIUM 40 MG: 40 INJECTION, POWDER, FOR SOLUTION INTRAVENOUS at 07:31

## 2021-01-01 RX ADMIN — PROPOFOL 30 MG: 10 INJECTION, EMULSION INTRAVENOUS at 10:19

## 2021-01-01 RX ADMIN — POTASSIUM CHLORIDE 40 MEQ: 20 TABLET, EXTENDED RELEASE ORAL at 21:47

## 2021-01-01 RX ADMIN — METRONIDAZOLE 500 MG: 500 INJECTION, SOLUTION INTRAVENOUS at 05:18

## 2021-01-01 RX ADMIN — SODIUM CHLORIDE 8 MG/HR: 900 INJECTION, SOLUTION INTRAVENOUS at 09:32

## 2021-01-01 RX ADMIN — PANTOPRAZOLE SODIUM 40 MG: 40 TABLET, DELAYED RELEASE ORAL at 07:44

## 2021-01-01 RX ADMIN — PHYTONADIONE 10 MG: 10 INJECTION, EMULSION INTRAMUSCULAR; INTRAVENOUS; SUBCUTANEOUS at 12:03

## 2021-01-01 RX ADMIN — METRONIDAZOLE 500 MG: 500 TABLET ORAL at 05:42

## 2021-01-01 RX ADMIN — METRONIDAZOLE 500 MG: 500 TABLET ORAL at 21:47

## 2021-01-01 RX ADMIN — NOREPINEPHRINE BITARTRATE 0.08 MCG/KG/MIN: 1 INJECTION, SOLUTION, CONCENTRATE INTRAVENOUS at 13:09

## 2021-01-01 RX ADMIN — MORPHINE SULFATE 2 MG/HR: 10 INJECTION, SOLUTION INTRAMUSCULAR; INTRAVENOUS at 04:02

## 2021-01-01 RX ADMIN — METRONIDAZOLE 500 MG: 500 INJECTION, SOLUTION INTRAVENOUS at 07:58

## 2021-01-01 RX ADMIN — POTASSIUM CHLORIDE 40 MEQ: 20 TABLET, EXTENDED RELEASE ORAL at 10:23

## 2021-01-01 RX ADMIN — METRONIDAZOLE 500 MG: 500 INJECTION, SOLUTION INTRAVENOUS at 15:37

## 2021-01-01 RX ADMIN — POTASSIUM CHLORIDE 10 MEQ: 10 INJECTION, SOLUTION INTRAVENOUS at 05:17

## 2021-01-01 RX ADMIN — POTASSIUM CHLORIDE 10 MEQ: 10 INJECTION, SOLUTION INTRAVENOUS at 12:34

## 2021-01-01 RX ADMIN — RANOLAZINE 500 MG: 500 TABLET, FILM COATED, EXTENDED RELEASE ORAL at 07:44

## 2021-01-01 RX ADMIN — SODIUM CHLORIDE, POTASSIUM CHLORIDE, SODIUM LACTATE AND CALCIUM CHLORIDE 1000 ML: 600; 310; 30; 20 INJECTION, SOLUTION INTRAVENOUS at 13:41

## 2021-01-01 RX ADMIN — POTASSIUM CHLORIDE 40 MEQ: 1500 TABLET, EXTENDED RELEASE ORAL at 05:42

## 2021-01-01 RX ADMIN — PANTOPRAZOLE SODIUM 80 MG: 40 INJECTION, POWDER, FOR SOLUTION INTRAVENOUS at 09:23

## 2021-01-01 RX ADMIN — SODIUM CHLORIDE, PRESERVATIVE FREE 10 ML: 5 INJECTION INTRAVENOUS at 07:44

## 2021-01-01 RX ADMIN — POTASSIUM CHLORIDE 10 MEQ: 10 INJECTION, SOLUTION INTRAVENOUS at 09:02

## 2021-01-01 RX ADMIN — MAGNESIUM SULFATE HEPTAHYDRATE 2 G: 40 INJECTION, SOLUTION INTRAVENOUS at 05:17

## 2021-01-01 RX ADMIN — SODIUM CHLORIDE, PRESERVATIVE FREE 10 ML: 5 INJECTION INTRAVENOUS at 21:17

## 2021-01-01 RX ADMIN — MORPHINE SULFATE 2 MG/HR: 10 INJECTION, SOLUTION INTRAMUSCULAR; INTRAVENOUS at 17:15

## 2021-01-01 RX ADMIN — VANCOMYCIN HYDROCHLORIDE 1000 MG: 10 INJECTION, POWDER, LYOPHILIZED, FOR SOLUTION INTRAVENOUS at 17:48

## 2021-01-01 RX ADMIN — ACETAMINOPHEN 650 MG: 325 TABLET ORAL at 13:28

## 2021-01-01 RX ADMIN — POTASSIUM PHOSPHATE, MONOBASIC AND POTASSIUM PHOSPHATE, DIBASIC 30 MMOL: 224; 236 INJECTION, SOLUTION, CONCENTRATE INTRAVENOUS at 15:13

## 2021-01-01 RX ADMIN — RANOLAZINE 500 MG: 500 TABLET, FILM COATED, EXTENDED RELEASE ORAL at 09:33

## 2021-01-01 RX ADMIN — PANTOPRAZOLE SODIUM 40 MG: 40 INJECTION, POWDER, FOR SOLUTION INTRAVENOUS at 19:46

## 2021-01-01 RX ADMIN — SODIUM CHLORIDE, POTASSIUM CHLORIDE, SODIUM LACTATE AND CALCIUM CHLORIDE: 600; 310; 30; 20 INJECTION, SOLUTION INTRAVENOUS at 09:24

## 2021-01-01 RX ADMIN — POTASSIUM CHLORIDE 10 MEQ: 7.46 INJECTION, SOLUTION INTRAVENOUS at 12:51

## 2021-01-01 RX ADMIN — PROPOFOL 50 MG: 10 INJECTION, EMULSION INTRAVENOUS at 10:37

## 2021-01-01 RX ADMIN — METRONIDAZOLE 500 MG: 500 INJECTION, SOLUTION INTRAVENOUS at 13:28

## 2021-01-01 ASSESSMENT — ENCOUNTER SYMPTOMS
VOMITING: 1
DIARRHEA: 1
NAUSEA: 1
ABDOMINAL DISTENTION: 1
SORE THROAT: 0
ABDOMINAL PAIN: 1
WHEEZING: 0
ABDOMINAL DISTENTION: 1
ABDOMINAL PAIN: 0
SORE THROAT: 0
RHINORRHEA: 0
CONSTIPATION: 0
CONSTIPATION: 0
DIARRHEA: 1
WHEEZING: 0
SORE THROAT: 0
BACK PAIN: 0
BLOOD IN STOOL: 1
NAUSEA: 1
CHEST TIGHTNESS: 0
COUGH: 0
ABDOMINAL PAIN: 0
CHEST TIGHTNESS: 0
COUGH: 0
SHORTNESS OF BREATH: 0

## 2021-01-01 ASSESSMENT — PATIENT HEALTH QUESTIONNAIRE - PHQ9
SUM OF ALL RESPONSES TO PHQ QUESTIONS 1-9: 0
SUM OF ALL RESPONSES TO PHQ QUESTIONS 1-9: 0

## 2021-01-01 ASSESSMENT — PAIN SCALES - GENERAL
PAINLEVEL_OUTOF10: 0
PAINLEVEL_OUTOF10: 3
PAINLEVEL_OUTOF10: 0

## 2021-01-01 ASSESSMENT — PAIN DESCRIPTION - PAIN TYPE: TYPE: CHRONIC PAIN

## 2021-01-01 ASSESSMENT — PAIN - FUNCTIONAL ASSESSMENT: PAIN_FUNCTIONAL_ASSESSMENT: ACTIVITIES ARE NOT PREVENTED

## 2021-01-01 NOTE — ANESTHESIA POSTPROCEDURE EVALUATION
Department of Anesthesiology  Postprocedure Note    Patient: Mukesh Flores  MRN: [id number]  YOB: 1925  Date of evaluation: 1/1/2021  Time:  10:52 AM     Procedure Summary     Date: 01/01/21 Room / Location: [address]    Anesthesia Start: 9987 Anesthesia Stop: 7860    Procedures:       EGD DIAGNOSTIC ONLY (N/A )      COLONOSCOPY POLYPECTOMY SNARE/HOT BIOPSY (N/A ) Diagnosis: (Anemia)    Surgeons: Mildred Bull MD Responsible Provider: Joanne Betancourt MD    Anesthesia Type: general ASA Status: 3          Anesthesia Type: No value filed. Alyson Phase I:      Alyson Phase II:      Last vitals: Reviewed and per EMR flowsheets.        Anesthesia Post Evaluation    Patient location during evaluation: bedside  Patient participation: complete - patient participated  Level of consciousness: awake  Pain score: 0  Airway patency: patent  Nausea & Vomiting: no nausea and no vomiting  Complications: no  Cardiovascular status: blood pressure returned to baseline  Respiratory status: acceptable  Hydration status: stable

## 2021-01-01 NOTE — BRIEF OP NOTE
Brief Postoperative Note      Patient: Donald Davis  YOB: 1925  MRN: [id number]    Date of Procedure: 1/1/2021    Pre-Op Diagnosis: Anemia    Post-Op Diagnosis: normal EGD, mild sigmoid diverticulosis, 8 mm polyp at         95 cm removed, no tumor, no ischemic colitis       Procedure(s):  EGD DIAGNOSTIC ONLY  COLONOSCOPY POLYPECTOMY SNARE/HOT BIOPSY    Surgeon(s):  Jos Dominique MD    Assistant:  Es Lu RN      Anesthesia: Monitor Anesthesia Care    Estimated Blood Loss (mL): none    Complications:  none    Specimens:   ID Type Source Tests Collected by Time Destination   A : duodenal bx/ chronic diarrhea r/o celiac disease Tissue Duodenum SURGICAL PATHOLOGY Jos Dominique MD 1/1/2021 1019    B : ascending colon polyp Tissue Colon SURGICAL PATHOLOGY Jos Dominique MD 1/1/2021 1038    C : random colon biopsies/ r/o microscopic colitis  Tissue Colon SURGICAL PATHOLOGY Jos Dominique MD 1/1/2021 1041        Implants:  * No implants in log *      Drains: * No LDAs found *    Findings: uncomplicated diverticulosis, small colon polyp               Resume Lucrezia Shoulder tomorrow                                                                   Electronically signed by Jos Dominique MD on 1/1/2021 at 10:54 AM

## 2021-01-01 NOTE — OP NOTE
[facility]ONNY[facility]AS[facility] OF [facility] MARIBEL [address], [address] [facility]                                OPERATIVE REPORT    PATIENT NAME: Sushil Blas                    :        1925  MED REC NO:   [id number]                              ROOM:       [facility]  ACCOUNT NO:   [de-identified]                           ADMIT DATE: 2020  PROVIDER:     Sebastián Garza MD    DATE OF PROCEDURE:  2021    PROCEDURE:  Colonoscopy with biopsies. INDICATION:  This 30-year-old white female has chronic diarrhea and  anemia. Colonoscopy is indicated to evaluate for etiologies. PREMEDICATION:  Propofol per anesthetist.    OPERATIVE PROCEDURE:  Gloved rectal exam revealed some elements of  possible rectal incontinence with laxity of anal sphincter tone. No  internal rectal mass was palpable The Olympus full-length video  colonoscope was inserted into the rectum and air insufflation completed. The rectal vault was normal with no mucosal inflammation or exudate. The sigmoid colon had mild diverticulosis, which appeared uncomplicated. The scope was advanced all the way to the cecum. The appendiceal  orifice was identified. Each segment of colon was slowly reviewed as  the scope was withdrawn with no colonic mucosal inflammation or  ulceration. There was a single 8 mm polyp at 95 cm from the anal verge  which was photographed, biopsied and then ablated using hot biopsy  forceps. Hemostasis was good. Random colon biopsies were obtained to  rule out microscopic colitis as an etiology of chronic diarrhea. The  scope was then removed from the patient. The patient tolerated  procedure a well. EBL none. FINDINGS:  1. Uncomplicated sigmoid diverticulosis. 2.  Small polyp removed from 95 cm. 3.  Possible rectal incontinence contributing to diarrhea. 4.  Await random colon biopsies to rule out microscopic colitis, which  would need additional treatment if found.     PLAN: 1.  Advance diet. Treat diarrhea symptomatically with Imodium as  needed. 2.  Arrange for followup in the GI Clinic in 2 weeks.   No further  inpatient workup is planned at this time and the patient can be  discharged as appropriate        Erasmo Lopez MD    D: 01/01/2021 12:27:48      T: 01/01/2021 14:24:38     [email or web address]  Job#: [id number]     Doc#: [id number]    CC:

## 2021-01-01 NOTE — PROGRESS NOTES
Physical Therapy   Name: Crispin Bryant  MRN:  [id number]  Date of service:  1/1/2021  Pt. off the floor for testing. Will f/u at a later time.   Electronically signed by Ayse Dailey PT on 1/1/2021 at 9:48 AM

## 2021-01-01 NOTE — PROGRESS NOTES
[facility]        Hospitalist Progress Note  1/1/2021 2:58 PM  Subjective:   Admit Date: 12/29/2020  PCP: Chuckie Galeazzi, MD    Chief Complaint: Direct admission for diarrhea    Subjective: Patient seen and examined at bedside. Appears comfortable. Diarrhea improving. S/p EGD/Colonoscopy today. Cumulative Hospital History: 80 y.o. female with a past medical history of CKD 3, hypertension, hypothyroidism, diabetes not on insulin, chronic atrial fibrillation on Xarelto, hyperlipidemia, ROSALES on CPAP, HFpEF who went to her PCP today for follow-up visit brought in by family. CT abdomen/pelvis showing possible enterocolitis and patient started on empiric antibiotics. 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 GI consulted for chronic diarrhea. Stool culture negative to date. C. difficile antigen unremarkable. Patient was noted to have pulmonary nodules and pancreatic cysts which will require outpatient follow-up imaging. EGD performed 3/7/1585 showing uncomplicated hiatal hernia; duodenal biopsies taken to rule out celiac disease. Colonoscopy performed 2/5/2086 showing uncomplicated sigmoid diverticulosis, a small polyp at 95 cm which was removed; colon biopsies were performed to rule out microscopic colitis. ROS: 14 point review of systems is negative except as specifically addressed above.     DIET GENERAL; Cardiac    Intake/Output Summary (Last 24 hours) at 1/1/2021 1458  Last data filed at 1/1/2021 1103  Gross per 24 hour   Intake 1053 ml   Output 400 ml   Net 653 ml     Medications:   sodium bicarbonate infusion 100 mL/hr at 12/31/20 2233    dextrose       Current Facility-Administered Medications   Medication Dose Route Frequency Provider Last Rate Last Admin    potassium chloride (KLOR-CON M) extended release tablet 40 mEq  40 mEq Oral BID Henrique Salgado MD   40 mEq at 01/01/21 1401  ciprofloxacin (CIPRO) IVPB 400 mg  400 mg Intravenous Q24H Rickie Bhatia MD   Stopped at 01/01/21 1329    sodium bicarbonate 150 mEq in dextrose 5 % 1,000 mL infusion   Intravenous Continuous Rickie Bhatia  mL/hr at 12/31/20 2233 New Bag at 12/31/20 2233    amLODIPine (NORVASC) tablet 10 mg  10 mg Oral Daily Rickie Bhatia MD   10 mg at 01/01/21 0744    carvedilol (COREG) tablet 25 mg  25 mg Oral BID  Rickie Bhatia MD   25 mg at 01/01/21 0744    dronedarone hcl (MULTAQ) tablet 400 mg  400 mg Oral BID  Rickie Bhatia MD   400 mg at 01/01/21 0744    levothyroxine (SYNTHROID) tablet 75 mcg  75 mcg Oral Daily Rickie Bhatia MD   75 mcg at 01/01/21 2731    pravastatin (PRAVACHOL) tablet 40 mg  40 mg Oral QPM Rickie Bhatia MD   40 mg at 12/31/20 1630    ranolazine (RANEXA) extended release tablet 500 mg  500 mg Oral BID Rickie Bhatia MD   500 mg at 01/01/21 0744    [Held by provider] rivaroxaban (XARELTO) tablet 15 mg  15 mg Oral Daily Rickie Bhatia MD   15 mg at 12/30/20 1718    sodium chloride flush 0.9 % injection 10 mL  10 mL Intravenous 2 times per day Rickie Bhatia MD   10 mL at 01/01/21 0744    sodium chloride flush 0.9 % injection 10 mL  10 mL Intravenous PRN Rickie Bhatia MD        polyethylene glycol [facility]) packet 17 g  17 g Oral Daily PRN Rickie Bhatia MD        acetaminophen (TYLENOL) tablet 650 mg  650 mg Oral Q6H PRN Rickie Bhatia MD        Or   [address] [place] [address] acetaminophen (TYLENOL) suppository 650 mg  650 mg Rectal Q6H PRN Rickie Bhatia MD        potassium chloride (KLOR-CON M) extended release tablet 40 mEq  40 mEq Oral PRN Rickie Bhatia MD   40 mEq at 01/01/21 0542    Or    potassium bicarb-citric acid (EFFER-K) effervescent tablet 40 mEq  40 mEq Oral PRN Rickie Bhatia MD        Or  potassium chloride 10 mEq/100 mL IVPB (Peripheral Line)  10 mEq Intravenous PRN Dayday Aaron  mL/hr at 12/30/20 1832 10 mEq at 12/30/20 1832    magnesium sulfate 2 g in 50 mL IVPB premix  2 g Intravenous PRN Dayday Aaron MD        insulin lispro (HUMALOG) injection vial 0-6 Units  0-6 Units Subcutaneous TID WC Dayday Aaron MD        insulin lispro (HUMALOG) injection vial 0-3 Units  0-3 Units Subcutaneous Nightly Dayday Aaron MD   1 Units at 12/31/20 2104    glucose (GLUTOSE) 40 % oral gel 15 g  15 g Oral PRN Dayday Aaron MD        dextrose 50 % IV solution  12.5 g Intravenous PRN Dayday Aaron MD        glucagon (rDNA) injection 1 mg  1 mg Intramuscular PRN Dayday Aaron MD        dextrose 5 % solution  100 mL/hr Intravenous PRN Dayday Aaron MD        [facility] injection 25 mg  25 mg Intravenous Q6H PRN Dayday Aaron MD   25 mg at 12/30/20 3674    metroNIDAZOLE (FLAGYL) tablet 500 mg  500 mg Oral 3 times per day Dayday Aaron MD   500 mg at 01/01/21 1417        Labs:     Recent Labs     12/30/20  0452 12/31/20  0438 01/01/21  0258   WBC 6.0 7.1 4.5*   RBC 3.52* 4.16* 3.79*   HGB 10.4* 12.1 11.1*   HCT 30.4* 35.7* 33.2*   MCV 86.4 85.8 87.6   MCH 29.5 29.1 29.3   MCHC 34.2 33.9 33.4    211 174     Recent Labs     12/30/20  1453 12/31/20  0438 01/01/21  0258    142 140   K 2.5* 3.4* 3.2*   ANIONGAP 11 11 10    106 104   CO2 24 25 26   BUN 16 11 8   CREATININE 1.3* 1.1* 1.1*   GLUCOSE 156* 137* 139*   CALCIUM 8.0* 8.1* 7.4*     Recent Labs     12/30/20  1453 12/31/20  0438 01/01/21  0258   MG 3.5* 2.7* 2.3     Recent Labs     12/30/20  0452 12/31/20  0438 01/01/21  0258   AST 12 19 18   ALT 13 17 15   BILITOT 0.5 0.5 0.4   ALKPHOS 86 98 83     ABGs:No results for input(s): PH, PO2, PCO2, HCO3, BE, O2SAT in the last 72 hours. Troponin T: No results for input(s): TROPONINI in the last 72 hours. INR: No results for input(s): INR in the last 72 hours. Lactic Acid: No results for input(s): LACTA in the last 72 hours. Objective:   Vitals: BP (!) 145/60   Pulse 60   Temp 97.4 °F (36.3 °C) (Temporal)   Resp 16   SpO2 94%   24HR INTAKE/OUTPUT:      Intake/Output Summary (Last 24 hours) at 1/1/2021 1458  Last data filed at 1/1/2021 1103  Gross per 24 hour   Intake 1053 ml   Output 400 ml   Net 653 ml     General appearance: Alert  Head: NC/AT  Eyes: conjunctivae/corneas clear   Ears: normal external ears  Neck: Supple  Lungs: BLAE, no wheezing   Heart: RRR   Abdomen: BS+, soft, NT  Extremities: no edema  Skin: warm  Neurologic: Alert, gross motor function intact  Psychiatric:  Mood appropriate    Assessment and Plan:   Principal Problem:    Diarrhea  Active Problems:    Essential hypertension    Permanent atrial fibrillation (HCC)    ROSALES on CPAP    CPAP (continuous positive airway pressure) dependence    Coronary artery disease due to calcified coronary lesion    Type 2 diabetes mellitus with diabetic polyneuropathy, without long-term current use of insulin (HCC)    Acquired hypothyroidism    Chronic diastolic congestive heart failure (HCC)    Stage 3 chronic kidney disease    Pancreatic cyst  Resolved Problems:    * No resolved hospital problems. *    Diarrhea/Inability to tolerate PO intake/?enterocolitis: Ciprofloxacin/Flagyl. Antiemetics PRN. IVF. Supportive management. Celiac studies pending. Biopsies pending for celiac disease and microscopic colitis. ?rectal incontinence contributing to diarrhea. Pulmonary nodules/Pancreatic cysts: Will require outpatient follow-up imaging. Hypokalemia/hypomagnesemia: Replacement protocol in place. Monitor BMP. CIELO on CKD III: Improving. IVF. US Renal noted. Monitor BMP.     HTN: Monitor BP and adjust medications PRN.     DM: HbA1c 5.5. ISS. Monitor BG and adjust medications PRN.     Afib: RC. AC to restart tomorrow     ROSALES on CPAP: Continue CPAP at night.     HFpEF: Monitor I's/O's.     Supportive management. PT/OT/SLP.     Advance Directive: Full Code    DVT prophylaxis: Xarelto held for procedure, restart tomorrow    Discharge planning: TBD - hopefully home soon      Signed:  Marylin Tubbs MD 1/1/2021 2:58 PM  Rounding Hospitalist

## 2021-01-01 NOTE — ANESTHESIA PRE PROCEDURE
Department of Anesthesiology  Preprocedure Note       Name:  Nadeen Aleman   Age:  80 y.o.  :  5/3/1925                                          MRN:  [id number]         Date:  2021      Surgeon: Song Franco):  Orly Gamez MD    Procedure: Procedure(s):  EGD DIAGNOSTIC ONLY  COLONOSCOPY DIAGNOSTIC    Medications prior to admission:   Prior to Admission medications    Medication Sig Start Date End Date Taking?  Authorizing Provider   dronedarone hcl (MULTAQ) 400 MG TABS TAKE ONE TABLET BY MOUTH TWICE A DAY WITH MORNING AND EVENING MEAL 20   Rhoda Drummond MD   carvedilol (COREG) 25 MG tablet TAKE ONE TABLET BY MOUTH TWICE A DAY WITH MEALS 20   Rhoda Drummond MD   levothyroxine (SYNTHROID) 75 MCG tablet TAKE ONE TABLET BY MOUTH EVERY DAY 20   Rhoda Drummond MD   amLODIPine (NORVASC) 10 MG tablet TAKE ONE TABLET BY MOUTH EVERY DAY 20   Rhoda Drummond MD   pravastatin (PRAVACHOL) 40 MG tablet TAKE ONE TABLET BY MOUTH EVERY EVENING 20   Rhoda Drummond MD   olmesartan (BENICAR) 40 MG tablet TAKE ONE TABLET BY MOUTH EVERY DAY 20   Rhoda Drummond MD   furosemide (LASIX) 40 MG tablet TAKE ONE TABLET BY MOUTH EVERY DAY AND EXTRA TABLET TWICE WEEKLY AS NEEDED 20   Rhoda Drummond MD   rivaroxaban (XARELTO) 15 MG TABS tablet TAKE ONE TABLET BY MOUTH EVERY DAY 20   Rhoda Drummond MD   ranolazine (RANEXA) 500 MG extended release tablet TAKE ONE TABLET BY MOUTH TWICE A DAY 20   Rhoda Drummond MD   potassium chloride (KLOR-CON M) 20 MEQ extended release tablet TAKE ONE TABLET BY MOUTH ONCE DAILY 20   Rhoda Drummond MD   blood glucose test strips (FREESTYLE LITE) strip USE ONE STRIP ONCE DAILY 20   Rhoda Drummond MD   Cholecalciferol (VITAMIN D3) 50 MCG ( UT) CAPS Take by mouth    Historical Provider, MD   acetaminophen (AMINOFEN) 325 MG tablet Take 2 tablets by mouth every 4 hours as needed for Pain 6/15/17   CAN Arreguin - CNP       Current medications: Current Facility-Administered Medications   Medication Dose Route Frequency Provider Last Rate Last Admin    [MAR Hold] potassium chloride (KLOR-CON M) extended release tablet 40 mEq  40 mEq Oral BID Mendel Mustard, MD   40 mEq at 01/01/21 0743    [MAR Hold] ciprofloxacin (CIPRO) IVPB 400 mg  400 mg Intravenous Q24H Mendel Mustard, MD   Stopped at 12/31/20 1434    [MAR Hold] sodium bicarbonate 150 mEq in dextrose 5 % 1,000 mL infusion   Intravenous Continuous Mendel Mustard,  mL/hr at 12/31/20 2233 New Bag at 12/31/20 2233    [MAR Hold] amLODIPine (NORVASC) tablet 10 mg  10 mg Oral Daily Mendel Mustard, MD   10 mg at 01/01/21 0744    [MAR Hold] carvedilol (COREG) tablet 25 mg  25 mg Oral BID  Mendel Mustard, MD   25 mg at 01/01/21 0744    [MAR Hold] dronedarone hcl (MULTAQ) tablet 400 mg  400 mg Oral BID  Mendel Mustard, MD   400 mg at 01/01/21 0744    [MAR Hold] levothyroxine (SYNTHROID) tablet 75 mcg  75 mcg Oral Daily Mendel Mustard, MD   75 mcg at 01/01/21 0743    [MAR Hold] pravastatin (PRAVACHOL) tablet 40 mg  40 mg Oral QPM Mendel Mustard, MD   40 mg at 12/31/20 1630    [MAR Hold] ranolazine (RANEXA) extended release tablet 500 mg  500 mg Oral BID Mendel Mustard, MD   500 mg at 01/01/21 0744    [Held by provider] rivaroxaban (XARELTO) tablet 15 mg  15 mg Oral Daily Mendel Mustard, MD   15 mg at 12/30/20 1718    [MAR Hold] sodium chloride flush 0.9 % injection 10 mL  10 mL Intravenous 2 times per day Mendel Mustard, MD   10 mL at 01/01/21 0744    [MAR Hold] sodium chloride flush 0.9 % injection 10 mL  10 mL Intravenous PRN Mendel Mustard, MD        [facility] Hold] polyethylene glycol [facility]) packet 17 g  17 g Oral Daily PRN Mendel Mustard, MD        [facility] Hold] acetaminophen (TYLENOL) tablet 650 mg  650 mg Oral Q6H PRN Mendel Mustard, MD        Or    [facility] Hold] acetaminophen (TYLENOL) suppository 650 mg  650 mg Rectal Q6H PRN Mendel Mustard, MD  [MAR Hold] potassium chloride (KLOR-CON M) extended release tablet 40 mEq  40 mEq Oral PRN Sarah Shrestha MD   40 mEq at 01/01/21 0542    Or    [MAR Hold] potassium bicarb-citric acid (EFFER-K) effervescent tablet 40 mEq  40 mEq Oral PRN Sarah Shrestha MD        Or   [facility] Shanda Dolly Hold] potassium chloride 10 mEq/100 mL IVPB (Peripheral Line)  10 mEq Intravenous PRN Sarah Shrestha  mL/hr at 12/30/20 1832 10 mEq at 12/30/20 1832    [MAR Hold] magnesium sulfate 2 g in 50 mL IVPB premix  2 g Intravenous PRN MD Naun Gray Hold] insulin lispro (HUMALOG) injection vial 0-6 Units  0-6 Units Subcutaneous TID WC MD Naun Gray Hold] insulin lispro (HUMALOG) injection vial 0-3 Units  0-3 Units Subcutaneous Nightly Sarah Shrestha MD   1 Units at 12/31/20 2104    [MAR Hold] glucose (GLUTOSE) 40 % oral gel 15 g  15 g Oral PRN MD Naun Gray Hold] dextrose 50 % IV solution  12.5 g Intravenous PRN MD Naun Gray Hold] glucagon (rDNA) injection 1 mg  1 mg Intramuscular PRN MD Naun Gray Hold] dextrose 5 % solution  100 mL/hr Intravenous PRN MD Naun Gray Hold] pantoprazole (PROTONIX) tablet 40 mg  40 mg Oral QAM AC Sarah Shrestha MD   40 mg at 01/01/21 0744    [MAR Hold] promethazine (PHENERGAN) injection 25 mg  25 mg Intravenous Q6H PRN Sarah Shrestha MD   25 mg at 12/30/20 0638    [MAR Hold] metroNIDAZOLE (FLAGYL) tablet 500 mg  500 mg Oral 3 times per day Sarah Shrestha MD   500 mg at 01/01/21 0542       Allergies:  No Known Allergies    Problem List:    Patient Active Problem List   Diagnosis Code    Essential hypertension I10    Permanent atrial fibrillation (HCC) I48.21    Pure hypercholesterolemia E78.00    ROSALES on CPAP G47.33, Z99.89    CPAP (continuous positive airway pressure) dependence Z99.89    Left carotid bruit R09.89    Blister (nonthermal), left lower leg, initial encounter I15.712H  Coronary artery disease due to calcified coronary lesion I25.10, I25.84    Obesity (BMI 30-39. 9) E66.9    Type 2 diabetes mellitus with diabetic polyneuropathy, without long-term current use of insulin ([facility]) E11.42    Acquired hypothyroidism E03.9    Mild mitral regurgitation I34.0    Exogenous obesity E66.09    Chronic diastolic congestive heart failure ([facility]) I50.32    Mild aortic regurgitation I35.1    Vitamin D deficiency E55.9    Stage 3 chronic kidney disease N18.30    SSS (sick sinus syndrome) ([facility]) I49.5    Diarrhea R19.7    Pancreatic cyst K86.2       Past Medical History:        Diagnosis Date    A-fib ([facility])     CPAP (continuous positive airway pressure) dependence     9cm    DM (diabetes mellitus screen)     Hyperlipidemia     Hypertension     Hyperthyroidism     Left carotid bruit     Macular degeneration     Obstructive sleep apnea     AHI:  15.3       Past Surgical History:        Procedure Laterality Date    APPENDECTOMY      BLADDER SURGERY      CHOLECYSTECTOMY      PACEMAKER PLACEMENT      PAT AND BSO         Social History:    Social History     Tobacco Use    Smoking status: Never Smoker    Smokeless tobacco: Never Used   Substance Use Topics    Alcohol use:  No                                Counseling given: Not Answered      Vital Signs (Current):   Vitals:    12/31/20 1830 12/31/20 2232 01/01/21 0219 01/01/21 0616   BP: 112/65 (!) 155/73 (!) 153/70 122/67   Pulse: 58 66 69 60   Resp: 17 18 18 18   Temp: 97.9 °F (36.6 °C) 97.4 °F (36.3 °C) 97.6 °F (36.4 °C) 96.8 °F (36 °C)   TempSrc: Temporal Temporal Temporal Temporal   SpO2: 95% 95% 96% 97%                                              BP Readings from Last 3 Encounters:   01/01/21 122/67   12/29/20 (!) 128/50   12/23/20 132/62       NPO Status:                                                                                 BMI:   Wt Readings from Last 3 Encounters:   12/29/20 144 lb (65.3 kg) 12/23/20 147 lb (66.7 kg)   12/15/20 150 lb (68 kg)     There is no height or weight on file to calculate BMI.    CBC:   Lab Results   Component Value Date    WBC 4.5 01/01/2021    RBC 3.79 01/01/2021    HGB 11.1 01/01/2021    HCT 33.2 01/01/2021    MCV 87.6 01/01/2021    RDW 16.5 01/01/2021     01/01/2021       CMP:   Lab Results   Component Value Date     01/01/2021    K 3.2 01/01/2021    K 2.5 12/30/2020     01/01/2021    CO2 26 01/01/2021    BUN 8 01/01/2021    CREATININE 1.1 01/01/2021    GFRAA 56 01/01/2021    LABGLOM 46 01/01/2021    GLUCOSE 139 01/01/2021    PROT 4.7 01/01/2021    CALCIUM 7.4 01/01/2021    BILITOT 0.4 01/01/2021    ALKPHOS 83 01/01/2021    AST 18 01/01/2021    ALT 15 01/01/2021       POC Tests:   Recent Labs     01/01/21  0741   POCGLU 153*       Coags: No results found for: PROTIME, INR, APTT    HCG (If Applicable): No results found for: PREGTESTUR, PREGSERUM, HCG, HCGQUANT     ABGs: No results found for: PHART, PO2ART, ZLA1XDN, FVE1HQT, BEART, O3SKWHCH     Type & Screen (If Applicable):  No results found for: LABABO, LABRH    Drug/Infectious Status (If Applicable):  No results found for: HIV, HEPCAB    COVID-19 Screening (If Applicable):   Lab Results   Component Value Date    COVID19 Not Detected 12/31/2020         Anesthesia Evaluation  Patient summary reviewed and Nursing notes reviewed no history of anesthetic complications:   Airway: Mallampati: II  TM distance: >3 FB   Neck ROM: full  Mouth opening: > = 3 FB Dental:          Pulmonary:   (+) sleep apnea: on CPAP,                             Cardiovascular:    (+) hypertension:, valvular problems/murmurs: MR, CAD:, CHF:,                   Neuro/Psych:      (-) seizures and CVA           GI/Hepatic/Renal:   (+) GERD:, renal disease: CRI,           Endo/Other:    (+) Diabetes, hypothyroidism, blood dyscrasia: anticoagulation therapy:., .                 Abdominal:           Vascular: negative vascular ROS. Anesthesia Plan      general     ASA 3       Induction: intravenous. Anesthetic plan and risks discussed with patient.                       Romayne Presser, MD   1/1/2021

## 2021-01-01 NOTE — OP NOTE
[employer] [facility] MARIBEL [address], [address] [facility]                                OPERATIVE REPORT    PATIENT NAME: Deysi Prabhakar                    :        1925  MED REC NO:   [id number]                              ROOM:       [facility]  ACCOUNT NO:   [de-identified]                           ADMIT DATE: 2020  PROVIDER:     Katarzyna Baez MD    DATE OF PROCEDURE:  2021    PROCEDURES PERFORMED:  EGD. INDICATIONS:  This is a 75-year-old white female with a history of  chronic atrial fibrillation, on Xarelto; hypertension and mild chronic  kidney disease; has unexplained anemia and chronic diarrhea. Stool  culture and assay for C. diff are negative. She is undergoing EGD to  rule out intrinsic small bowel disease such as celiac disease and  exclude any upper GI lesion that could be a source of intermittent GI  blood loss while on anticoagulant therapy. PREMEDICATION:  Propofol per anesthetist.    OPERATIVE PROCEDURE:  The Olympus videoscope was passed by mouth. The  esophagus had a normal mucosal lining down to the GE junction located at  35 cm from the incisor teeth with an uncomplicated 5 cm hiatal hernia  below. Stomach was insufflated with air, all gastric surfaces appeared  normal on both forward-viewing and scope retroflexion. Pylorus was  patent. The duodenal bulb had no inflammation or ulcer. The second  portion of the duodenum had no flattening or thickening of the small  bowel folds. Two biopsies of the duodenum were taken to rule out celiac  disease or other etiology of chronic diarrhea. Scope was then removed  from the patient. The patient tolerated procedure well. EBL none. FINDINGS:  1. Uncomplicated hiatal hernia. 2.  No active ulcer disease or esophagitis. 3.  Await duodenal biopsies to rule out celiac disease.         Sana Griffith MD    D: 2021 12:27:48      T: 2021 12:38:09     [email or web address] Job#: [id number]     Doc#: [id number]    CC:

## 2021-01-02 NOTE — PROGRESS NOTES
Physical Therapy    Facility/Department: [facility] 5 SURG SERVICES  Initial Assessment    NAME: Coy Yoon  : 5/3/1925  MRN: [id number]    Date of Service: 2021    Discharge Recommendations:  Continue to assess pending progress, Home with assist PRN        Assessment   Body structures, Functions, Activity limitations: Decreased functional mobility ; Decreased ADL status; Decreased strength;Decreased balance  Assessment: AMB TO BR WITH RW THEN UP TO RECLINER. MOVING WELL OVERALL. WILL BENEFIT FROM FAMILY SUPERVISION UPON INITIAL D/C HOME. PT Education: PT Role;Plan of Care  REQUIRES PT FOLLOW UP: Yes  Activity Tolerance  Activity Tolerance: Patient Tolerated treatment well       Patient Diagnosis(es): There were no encounter diagnoses. has a past medical history of A-fib ([address] CPAP (continuous positive airway pressure) dependence, DM (diabetes mellitus screen), Hyperlipidemia, Hypertension, Hyperthyroidism, Left carotid bruit, Macular degeneration, and Obstructive sleep apnea. has a past surgical history that includes Cholecystectomy; Appendectomy; pacemaker placement; Bladder surgery; and jackson and bso (cervix removed). Restrictions  Restrictions/Precautions  Restrictions/Precautions: Fall Risk  Vision/Hearing  Hearing Exceptions: Bilateral hearing aid;Hard of hearing/hearing concerns     Subjective  General  Diagnosis: DIVERTICULOSIS, DIARRHEA  Subjective  Subjective: Pt READY TO GET OOB. HOPING FOR D/C HOME SOON.   Pain Screening  Patient Currently in Pain: No  Vital Signs  Patient Currently in Pain: No       Orientation  Orientation  Overall Orientation Status: Within Normal Limits  Social/Functional History  Social/Functional History  Lives With: Son  Type of Home: House  Home Layout: One level  Home Access: Stairs to enter with rails  Entrance Stairs - Number of Steps: 2  Bathroom Shower/Tub: Tub/Shower unit  Bathroom Toilet: Standard  Bathroom Equipment: Shower chair  Home Equipment: Rolling walker ADL Assistance: Independent  Homemaking Assistance: Independent  Homemaking Responsibilities: Yes  Ambulation Assistance: Independent  Transfer Assistance: Independent  Active : No  Cognition   Cognition  Overall Cognitive Status: WNL    Objective          AROM RLE (degrees)  RLE AROM: WFL  AROM LLE (degrees)  LLE AROM : WFL  Strength RLE  Comment: GROSSLY +4/5  Strength LLE  Comment: GROSSLY +4/5        Bed mobility  Supine to Sit: Modified independent(BEDRAIL)  Transfers  Sit to Stand: Contact guard assistance  Stand to sit: Contact guard assistance  Bed to Chair: Contact guard assistance  Ambulation 1  Device: Rolling Walker  Assistance: Contact guard assistance  Quality of Gait: MIGEL NOT IN RW, VC'S TO CORRECT  Gait Deviations: Slow Marilee;Decreased step length;Decreased step height  Distance: 10' X 2     Balance  Sitting - Dynamic: Good  Standing - Dynamic: Fair;+        Plan   Plan  Times per week: AT LEAST 5-7  Current Treatment Recommendations: Strengthening, Balance Training, Functional Mobility Training, Transfer Training, Gait Training, Patient/Caregiver Education & Training, Safety Education & Training  Safety Devices  Type of devices: Call light within reach, Chair alarm in place    G-Code       OutComes Score                                                  AM-PAC Score             Goals  Short term goals  Time Frame for Short term goals: 14 DAYS  Short term goal 1: TRANSFERS MOD IND  Short term goal 2: ' RW SUP-IND       Therapy Time   Individual Concurrent Group Co-treatment   Time In           Time Out           Minutes                   Freddie Jackson PT

## 2021-01-02 NOTE — DISCHARGE SUMMARY
Discharge Summary      Paulo Chicas  :  5/3/1925  MRN:  [id number]    Admit date:  2020  Discharge date:      Admitting Physician:  Madison Gan MD    Advance Directive: Full Code    Consults: GI    Primary Care Physician:  Massimo Chowdary MD    Discharge Diagnoses:  Principal Problem:    Diarrhea  Active Problems:    Essential hypertension    Permanent atrial fibrillation (HCC)    ROSALES on CPAP    CPAP (continuous positive airway pressure) dependence    Coronary artery disease due to calcified coronary lesion    Type 2 diabetes mellitus with diabetic polyneuropathy, without long-term current use of insulin (HCC)    Acquired hypothyroidism    Chronic diastolic congestive heart failure (HCC)    Stage 3 chronic kidney disease    Pancreatic cyst  Resolved Problems:    * No resolved hospital problems.  *      Significant Diagnostic Studies:   Ct Abdomen Pelvis Wo Contrast Additional Contrast? None    Result Date: 2020 CT abdomen and pelvis 12/29/2020 3:44 PM HISTORY: Diarrhea TECHNIQUE: Axial images of the abdomen and pelvis were obtained without IV contrast. Coronal and sagittal reformatted images are reconstructed and reviewed. COMPARISON: 12/23/2020. DLP: 1283 mGy cm Automated exposure control was utilized to minimize patient radiation dose. FINDINGS: A 7 mm hypodensity in the left hepatic lobe similar and favorable for an incidental cyst. Spleen remains similar in size and appearance. Accessory splenule seen inferior to the splenic hilum on image 21. There are scattered cystic foci the pancreas is which may be pancreatic cysts, intraductal papillary mucinous neoplasms, or cystic dilatation of pancreatic duct. Overall appearance is similar to the recent exam. No peripancreatic inflammation. Cholecystectomy clips. No suspicious adrenal nodule. No abnormal perinephric fluid collection. No hydronephrosis. Latter underdistended. Uterus absent. There is fluid contained throughout the small and large bowel. There is no radiographic evidence of bowel obstruction. No free air or abscess. Stomach is under distended. Moderate size hiatal hernia. Few sigmoid diverticuli with no adjacent inflammation. There is2 diffuse vascular calcification with no aneurysm. Cardiac pacer device. Myocardium likely. Trace pericardial fluid. No pleural effusions. Visible lung bases are unremarkable. Osteopenia and degenerative change of the regional skeleton. Chronic compression of superior plate of L4. Levoscoliotic curvature of the lumbar spine. 1. There is fluid contained throughout the nondilated small and large bowel loops which may be seen with an infectious/inflammatory enterocolitis. No prominent abnormal bowel wall thickening. No evidence of bowel obstruction. No free air or abscess. 2. Multiple scattered cystic lesions of the pancreas may represent cysts, intraductal papillary mucinous neoplasms, or cystic distention pancreatic duct. Finding is not acute and there is no peripancreatic inflammation. Comparison outside films would be helpful to assess chronicity of the finding. If no outside studies, follow-up nonemergent outpatient CT or MR imaging with and without IV contrast utilizing pancreatic protocol would be best for further evaluation. If Patient unable to tolerate IV contrast, short-term follow-up CT exam in 3 months recommended to reassess. 3. Probable subcentimeter lateral left hepatic cyst. 4. Prior cholecystectomy, hysterectomy, and appendectomy. 3. Cardiac pacer device. Mild cardiomegaly with trace pericardial fluid. 5. Diffuse vascular calcification with no regional aneurysm.  Signed by Dr Bob Butler on 12/29/2020 5:04 PM    Ct Chest Wo Contrast    Result Date: 12/30/2020 peripheral right lower lobe (axial images 83-90) additional tiny groundglass nodules are seen throughout the right upper lobe. Review of the visualized portion of the upper abdomen demonstrates nonobstructive right renal stone. There is a nodule in the upper abdomen adjacent to the hemidiaphragm measuring 7 mm in size. There is intra and extrahepatic biliary ductal dilatation status post cholecystectomy. Multiple cystic lesions are noted throughout the pancreas which are not well evaluated by CT. There is a small right adrenal nodule. Review of the visualized osseous structures demonstrates no acute or aggressive lesions. Old right-sided rib fractures are present. Patient appears osteopenic. There are degenerative changes in the spine. Impression: 1. No evidence of a right paratracheal mass. 2. Multiple groundglass nodules bilaterally suggest an inflammatory process. 3. Additional noncalcified pulmonary nodules are present. If patient is considered high risk, follow-up CT should be considered in 12 months per Fleischner Society guidelines. If the patient is considered low risk, no follow-up is recommended. 4. Atherosclerosis of the aorta and coronary arteries. 5. Small hiatal hernia. 6. Multiple pancreatic lesions are incompletely evaluated. Consider follow-up nonemergent MRI abdomen with and without contrast with MRCP.  Signed by Dr Velvet Vides on 12/30/2020 8:51 AM    Us Renal Complete    Result Date: 12/30/2020 Examination. US RENAL COMPLETE 12/30/2020 6:12 AM History: Acute renal injury. The ultrasound examination of the kidneys bilaterally is performed. There is no previous study for comparison. The study is of limited diagnostic value due to patient's body habitus and equipment limitation. The right kidney measures 8.7 x 4 x 3.9 cm. Moderate lobulation of renal contour. No discrete mass or hydronephrosis. The renal cortex measures 0.8 cm. Left kidney measures 9.5 x 3.5 x 4.5 cm. No evidence of mass. No hydronephrosis. The renal cortex measures 1.2 cm. The urinary bladder is incompletely distended with moderate symmetrical thickening of the walls. No obvious intrinsic abnormality. A limited diagnostic study. No discrete renal mass or hydronephrosis. Signed by Dr Kelsey Espinoza on 12/30/2020 7:42 AM    Xr Chest Portable    Result Date: 12/29/2020  EXAMINATION: Chest one view 12/29/2020 HISTORY: Dyspnea FINDINGS: Upright frontal projection of the chest demonstrates a focal bulge in the right paratracheal contour in the superior mediastinum. There is no displacement of the trachea. This may simply be related to a tortuous great vessel but an aneurysm of a great vessel or paratracheal mass is not entirely excluded. Unfortunately, I do not have any prior chest radiographs available for comparison purposes. Follow-up with enhanced CT imaging of the chest could BE obtained to better characterize this finding. The mediastinal contours are otherwise unremarkable. There is atheromatous calcification tortuosity of the thoracic aorta. There is a granuloma within the left lung base. Transvenous pacer is well positioned. 1.. Focal bulging of the right paratracheal stripe in the superior mediastinum. Differential considerations are as above but do include paratracheal adenopathy or mass within the medial right apex. Follow-up with CT imaging of the chest with IV contrast could BE obtained to better characterize this finding. 2. Lungs are otherwise clear. Signed by Dr Anastacia Chacon on 12/29/2020 5:06 PM      Pertinent Labs:   CBC:   Recent Labs     12/31/20 0438 01/01/21 0258 01/02/21 0218   WBC 7.1 4.5* 4.5*   HGB 12.1 11.1* 11.3*    174 166     BMP:    Recent Labs     12/31/20 0438 01/01/21 0258 01/02/21 0218    140 142   K 3.4* 3.2* 3.6    104 108   CO2 25 26 25   BUN 11 8 6*   CREATININE 1.1* 1.1* 0.9   GLUCOSE 137* 139* 120*     INR: No results for input(s): INR in the last 72 hours. ABGs:No results for input(s): PH, PO2, PCO2, HCO3, BE, O2SAT in the last 72 hours. Lactic Acid:No results for input(s): LACTA in the last 72 hours.     Procedures: EGD/Colonoscopy Hospital Course: 80 y. o. female with a past medical history of CKD 3, hypertension, hypothyroidism, diabetes not on insulin, chronic atrial fibrillation on Xarelto, hyperlipidemia, ROSALES on CPAP, HFpEF who went to her PCP today for follow-up visit brought in by family. CT abdomen/pelvis showing possible enterocolitis and patient started on empiric antibiotics. Patient also noted to have possible colovesicular fistula with urology consulted; no evidence of fistula found with urology. GI consulted for chronic diarrhea. Stool culture negative to date. C. difficile antigen unremarkable. Patient was noted to have pulmonary nodules and pancreatic cysts which will require outpatient follow-up imaging. EGD performed 0/5/7572 showing uncomplicated hiatal hernia; duodenal biopsies taken to rule out celiac disease. Colonoscopy performed 5/0/6267 showing uncomplicated sigmoid diverticulosis, a small polyp at 95 cm which was removed; colon biopsies were performed to rule out microscopic colitis. Patient is currently hemodynamically stable for discharge home to follow-up with PCP, GI as an outpatient.     Physical Exam:  Vitals: /70   Pulse 61   Temp 97.7 °F (36.5 °C) (Temporal)   Resp 18   SpO2 98%   24HR INTAKE/OUTPUT:      Intake/Output Summary (Last 24 hours) at 1/2/2021 1046  Last data filed at 1/2/2021 0442  Gross per 24 hour   Intake 150 ml   Output    Net 150 ml     General appearance: Alert  Head: NC/AT  Eyes: conjunctivae/corneas clear   Ears: normal external ears  Neck: Supple  Lungs: BLAE, no wheezing   Heart: RRR   Abdomen: BS+, soft, NT  Extremities: no edema  Skin: warm  Neurologic: Alert, gross motor function intact  Psychiatric:  Mood appropriate    Discharge Medications:       Jarad Moment   Home Medication Instructions VMD:[id number]    Printed on:01/02/21 1046   Medication Information                      acetaminophen (AMINOFEN) 325 MG tablet Take 2 tablets by mouth every 4 hours as needed for Pain             amLODIPine (NORVASC) 10 MG tablet  TAKE ONE TABLET BY MOUTH EVERY DAY             blood glucose test strips (FREESTYLE LITE) strip  USE ONE STRIP ONCE DAILY             carvedilol (COREG) 25 MG tablet  TAKE ONE TABLET BY MOUTH TWICE A DAY WITH MEALS             Cholecalciferol (VITAMIN D3) 50 MCG (2000 UT) CAPS  Take by mouth             ciprofloxacin (CIPRO) 500 MG tablet  Take 1 tablet by mouth 2 times daily for 5 days             dronedarone hcl (MULTAQ) 400 MG TABS  TAKE ONE TABLET BY MOUTH TWICE A DAY WITH MORNING AND EVENING MEAL             furosemide (LASIX) 40 MG tablet  TAKE ONE TABLET BY MOUTH EVERY DAY AND EXTRA TABLET TWICE WEEKLY AS NEEDED             levothyroxine (SYNTHROID) 75 MCG tablet  TAKE ONE TABLET BY MOUTH EVERY DAY             loperamide (IMODIUM) 2 MG capsule  Take 1 capsule by mouth 4 times daily as needed for Diarrhea             metroNIDAZOLE (FLAGYL) 500 MG tablet  Take 1 tablet by mouth 3 times daily for 5 days             olmesartan (BENICAR) 40 MG tablet  TAKE ONE TABLET BY MOUTH EVERY DAY             potassium chloride (KLOR-CON M) 20 MEQ extended release tablet  TAKE ONE TABLET BY MOUTH ONCE DAILY             pravastatin (PRAVACHOL) 40 MG tablet  TAKE ONE TABLET BY MOUTH EVERY EVENING             ranolazine (RANEXA) 500 MG extended release tablet  TAKE ONE TABLET BY MOUTH TWICE A DAY             rivaroxaban (XARELTO) 15 MG TABS tablet  TAKE ONE TABLET BY MOUTH EVERY DAY                 Discharge Instructions: Follow up with Maria Fernanda Peter MD in 7 days. Take medications as directed. Resume activity as tolerated. Diet: DIET GENERAL; Cardiac     Disposition: Patient is medically stable and will be discharged Home. Time spent on discharge 35 minutes.     Signed:  Carmel Gong MD 1/2/2021 10:46 AM

## 2021-01-02 NOTE — DISCHARGE INSTR - DIET
? Good nutrition is important when healing from an illness, injury, or surgery. Follow any nutrition recommendations given to you during your hospital stay. ? If you were given an oral nutrition supplement while in the hospital, continue to take this supplement at home. You can take it with meals, in-between meals, and/or before bedtime. These supplements can be purchased at most local grocery stores, pharmacies, and chain [employer]-stores. ? If you have any questions about your diet or nutrition, call the hospital and ask for the dietitian.   Resume a genaral diet/cardiac diet

## 2021-01-02 NOTE — PLAN OF CARE
Problem: Falls - Risk of:  Goal: Will remain free from falls  Description: Will remain free from falls  Outcome: Ongoing  Goal: Absence of physical injury  Description: Absence of physical injury  Outcome: Ongoing     Problem: Diarrhea:  Goal: Bowel elimination is within specified parameters  Description: Bowel elimination is within specified parameters  Outcome: Ongoing  Goal: Passage of soft, formed stool  Description: Passage of soft, formed stool  Outcome: Ongoing  Goal: Establishment of normal bowel function will improve to within specified parameters  Description: Establishment of normal bowel function will improve to within specified parameters  Outcome: Ongoing

## 2021-01-02 NOTE — CARE COORDINATION
Spoke with patient regarding MD orders for [facility] services. Pt agreeable and chose         [facility] [place] Referral accepted and faxed. Please notify [facility] when patient discharges and Fax DC Summary,  DC med list and any new [facility] orders. The Patient and/or patient representative Leonila (patient) was provided with a choice of provider and agrees   with the discharge plan. [x] Yes [] No    Freedom of choice list was provided with basic dialogue that supports the patient's individualized plan of care/goals, treatment preferences and shares the quality data associated with the providers.  [x] Yes [] No  Electronically signed by Garett Phelps on 1/2/21 at 1:30 PM CST

## 2021-01-04 NOTE — TELEPHONE ENCOUNTER
Leonila's son, Chance Frey called to [facility] Torre Friday. Please be advised that the best time to call her to accommodate their needs is Anytime. Thank you.

## 2021-01-05 NOTE — TELEPHONE ENCOUNTER
[facility] Transitions Initial Follow Up Call    Outreach made within 2 business days of discharge: Yes    Patient: Joesph Ruiz   Patient : 5/3/1925         MRN: [id number] Reason for Admission: Admitted 20 for diarrhea  Discharge Date: 21    Discharge Diagnoses:  Principal Problem:    Diarrhea  Active Problems:    Essential hypertension    Permanent atrial fibrillation (HCC)    ROSALES on CPAP    CPAP (continuous positive airway pressure) dependence    Coronary artery disease due to calcified coronary lesion    Type 2 diabetes mellitus with diabetic polyneuropathy, without long-term current use of insulin (HCC)    Acquired hypothyroidism    Chronic diastolic congestive heart failure ([address]    Stage 3 chronic kidney disease    Pancreatic cyst  Resolved Problems:    * No resolved hospital problems. *     Spoke with: patient's      Discharge department/facility: [facility]     TCM Interactive Patient Contact:  Was patient able to fill all prescriptions: Yes  Was patient instructed to bring all medications to the follow-up visit: Yes  Is patient taking all medications as directed in the discharge summary? Yes  Does patient understand their discharge instructions: Yes  Does patient have questions or concerns that need addressed prior to 7-14 day follow up office visit: no    I spoke with Mr. Daniel Lemos son, he states she is home but still feeling weak. She is ambulating with assist of the walker. Home health is supposed to be by today to start therapy an to bath her. He reports she is having a hard time with her antibiotic. He states it is upsetting her stomach. She is not vomiting it up, but feels nauseous afterward. She has not been taking it with food. I advised him to have her try taking it with food. She is still having diarrhea. She reported to him it was firming up, he states he has not seen it. She does not have much of an appetite. She ate some toast this morning.  I advised him I will send a message to Dr. Pina Leahy to see if we can get something for nausea sent in for her. He denies any other needs and will bring her in for her appointment on Thursday.      Scheduled appointment with PCP within 7-14 days    Follow Up  Future Appointments   Date Time Provider Ej Krause   1/7/2021 10:15 AM MD DOC Abernathy [facility]   1/19/2021  3:20 PM CAN Pierce NP [facility]   2/1/2021 10:00 AM MD GRAEME Alonzo [facility]   4/16/2021 11:30 AM MD [facility] [address] [place], [place] [address], [place]

## 2021-01-05 NOTE — TELEPHONE ENCOUNTER
I spoke with Mr Jonathan Angeles for Mrs. Joy's TCM call, he states the Cipro is making her nauseous. She does has not vomited, but her stomach is upset after taking it. She doesn't have much of an appetite to take it with food. He is requesting something for nausea to be sent to [facility] drug store.

## 2021-01-07 NOTE — PROGRESS NOTES
Hospital Follow-up Visit      Queenie Mandujano   YOB: 1925    Date of Visit:  1/7/2021    There were no vitals filed for this visit. There is no height or weight on file to calculate BMI.      Wt Readings from Last 3 Encounters:   12/29/20 144 lb (65.3 kg)   12/23/20 147 lb (66.7 kg)   12/15/20 150 lb (68 kg)     BP Readings from Last 3 Encounters:   01/02/21 136/70   01/01/21 117/68   12/29/20 (!) 128/50       No Known Allergies  Outpatient Medications Marked as Taking for the 1/7/21 encounter (Virtual Visit) with Jose Pierce MD   Medication Sig Dispense Refill    ondansetron (ZOFRAN) 4 MG tablet Take 1 tablet by mouth every 6-8 hours as needed for Nausea or Vomiting 24 tablet 0    loperamide (IMODIUM) 2 MG capsule Take 1 capsule by mouth 4 times daily as needed for Diarrhea 40 capsule 0    ciprofloxacin (CIPRO) 500 MG tablet Take 1 tablet by mouth 2 times daily for 5 days 10 tablet 0    metroNIDAZOLE (FLAGYL) 500 MG tablet Take 1 tablet by mouth 3 times daily for 5 days 15 tablet 0    dronedarone hcl (MULTAQ) 400 MG TABS TAKE ONE TABLET BY MOUTH TWICE A DAY WITH MORNING AND EVENING MEAL 180 tablet 1    carvedilol (COREG) 25 MG tablet TAKE ONE TABLET BY MOUTH TWICE A DAY WITH MEALS 180 tablet 1    levothyroxine (SYNTHROID) 75 MCG tablet TAKE ONE TABLET BY MOUTH EVERY DAY 30 tablet 5    amLODIPine (NORVASC) 10 MG tablet TAKE ONE TABLET BY MOUTH EVERY DAY 90 tablet 1    pravastatin (PRAVACHOL) 40 MG tablet TAKE ONE TABLET BY MOUTH EVERY EVENING 90 tablet 1    olmesartan (BENICAR) 40 MG tablet TAKE ONE TABLET BY MOUTH EVERY DAY 90 tablet 1    furosemide (LASIX) 40 MG tablet TAKE ONE TABLET BY MOUTH EVERY DAY AND EXTRA TABLET TWICE WEEKLY AS NEEDED 135 tablet 1    rivaroxaban (XARELTO) 15 MG TABS tablet TAKE ONE TABLET BY MOUTH EVERY DAY 90 tablet 1    ranolazine (RANEXA) 500 MG extended release tablet TAKE ONE TABLET BY MOUTH TWICE A  tablet 1  potassium chloride (KLOR-CON M) 20 MEQ extended release tablet TAKE ONE TABLET BY MOUTH ONCE DAILY 90 tablet 3    blood glucose test strips (FREESTYLE LITE) strip USE ONE STRIP ONCE DAILY 50 strip 11    Cholecalciferol (VITAMIN D3) 50 MCG (2000 UT) CAPS Take by mouth           CC:  Patient presents for hospital discharge follow-upafter admission   (following highlighted text has been copied from this specialist note)      Admitting Physician:  Danita Coawn MD     Advance Directive: Full Code     Consults: GI     Primary Care Physician:  Lai Sierra MD     Discharge Diagnoses:  Principal Problem:    Diarrhea  Active Problems:    Essential hypertension    Permanent atrial fibrillation (HCC)    ROSALES on CPAP    CPAP (continuous positive airway pressure) dependence    Coronary artery disease due to calcified coronary lesion    Type 2 diabetes mellitus with diabetic polyneuropathy, without long-term current use of insulin (HCC)    Acquired hypothyroidism    Chronic diastolic congestive heart failure (HCC)    Stage 3 chronic kidney disease    Pancreatic cyst  Resolved Problems:    * No resolved hospital problems.  *        Significant Diagnostic Studies:   Ct Abdomen Pelvis Wo Contrast Additional Contrast? None       Xr Chest Portable      1. . Focal bulging of the right paratracheal stripe in the superior mediastinum. Differential considerations are as above but do include paratracheal adenopathy or mass within the medial right apex. Follow-up with CT imaging of the chest with IV contrast could BE obtained to better characterize this finding. 2. Lungs are otherwise clear. Signed by Dr Jaylene Runner on 12/29/2020 5:06 PM        Pertinent Labs:   CBC:         Recent Labs     12/31/20 0438 01/01/21 0258 01/02/21 0218   WBC 7.1 4.5* 4.5*   HGB 12.1 11.1* 11.3*    174 166      BMP:          Recent Labs     12/31/20 0438 01/01/21 0258 01/02/21 0218    140 142   K 3.4* 3.2* 3.6    104 108   CO2 25 26 25   BUN 11 8 6*   CREATININE 1.1* 1.1* 0.9   GLUCOSE 137* 139* 120*      INR: No results for input(s): INR in the last 72 hours. ABGs:No results for input(s): PH, PO2, PCO2, HCO3, BE, O2SAT in the last 72 hours. Lactic Acid:No results for input(s): LACTA in the last 72 hours.  Hospital Course: 80 y. o. female with a past medical history of CKD 3, hypertension, hypothyroidism, diabetes not on insulin, chronic atrial fibrillation on Xarelto, hyperlipidemia, ROSALES on CPAP, HFpEF who went to her PCP today for follow-up visit brought in by family.  CT abdomen/pelvis showing possible enterocolitis and patient started on empiric antibiotics.  Patient also noted to have possible colovesicular fistula with urology consulted; no evidence of fistula found with urology. Cristina Garces consulted for chronic diarrhea.  Stool culture negative to date.  C. difficile antigen unremarkable.  Patient was noted to have pulmonary nodules and pancreatic cysts which will require outpatient follow-up imaging.  EGD performed 7/6/1638 showing uncomplicated hiatal hernia; duodenal biopsies taken to rule out celiac disease.  Colonoscopy performed 1/8/9399 showing uncomplicated sigmoid diverticulosis, a small polyp at 95 cm which was removed; colon biopsies were performed to rule out microscopic colitis. Patient is currently hemodynamically stable for discharge home to follow-up with PCP, GI as an outpatient. Review of hospital procedures    Procedure(s):  EGD DIAGNOSTIC ONLY  COLONOSCOPY POLYPECTOMY SNARE/HOT BIOPSY     Surgeon(s):  Larisa Lay MD  EGD  1. Uncomplicated hiatal hernia. 2.  No active ulcer disease or esophagitis. 3.  Await duodenal biopsies to rule out celiac disease. Colonoscopy  FINDINGS:  1. Uncomplicated sigmoid diverticulosis. 2.  Small polyp removed from 95 cm. 3.  Possible rectal incontinence contributing to diarrhea. 4.  Await random colon biopsies to rule out microscopic colitis, which  would need additional treatment if found.     PLAN:  1. Advance diet. Treat diarrhea symptomatically with Imodium as  needed. 2.  Arrange for followup in the GI Clinic in 2 weeks.   No further  inpatient workup is planned at this time and the patient can be  discharged as appropriate    PATH: FINAL DIAGNOSIS:     A.  Small bowel, duodenum, biopsy:   Mild active duodenitis. Negative for evidence of celiac disease. Negative for Giardia organisms. Yuniel Diaz, ascending polyp, polypectomy:   Tubular adenoma. Negative for high-grade dysplasia. Helio Stephens, random, biopsy:   Benign colonic mucosa. Minimal to mild active inflammation. Negative for dysplasia. Negative for malignancy.      Lab results  1/2/2021 GFR 58 and liver functions normal  Hemoglobin 11.3  Magnesium 2.2  Medications are reconciled and reviewed. Inpatient course: Discharge summary reviewed- see chart. Current status: weak, continues diarrhea    Review of Systems   Constitutional: Positive for fatigue. Negative for chills and fever. HENT: Negative for congestion, ear pain, nosebleeds, postnasal drip and sore throat. Respiratory: Negative for cough, chest tightness and wheezing. Cardiovascular: Negative for chest pain, palpitations and leg swelling. Gastrointestinal: Positive for diarrhea. Negative for abdominal pain and constipation. Genitourinary: Negative for dysuria and urgency. Musculoskeletal: Negative. Negative for arthralgias. Skin: Negative for rash. Neurological: Positive for dizziness and weakness. Negative for headaches. Psychiatric/Behavioral: Negative.        Patient-Reported Vitals 1/7/2021   Patient-Reported Weight 147   Patient-Reported Height 5 2   Patient-Reported Systolic 465   Patient-Reported Diastolic 60   Patient-Reported Pulse 60   Patient-Reported Temperature 98   Patient-Reported SpO2 97        Physical exam  PHYSICAL EXAMINATION:  [ INSTRUCTIONS:  \"[x]\" Indicates a positive item  \"[]\" Indicates a negative item  -- DELETE ALL ITEMS NOT EXAMINED]  [x] Alert  [x] Oriented to person/place/time    [x] No apparent distress  [] Toxic appearing    [] Face flushed appearing [] Sclera clear  [] Lips are cyanotic      [x] Breathing appears normal  [] Appears tachypneic [] Rash on visible skin    [x] Cranial Nerves II-XII grossly intact    [x] Motor grossly intact in visible upper extremities    [x] Motor grossly intact in visible lower extremities    [x] Normal Mood  [] Anxious appearing    [] Depressed appearing  [] Confused appearing      [] Poor short term memory  [] Poor long term memory    [] OTHER:      Due to this being a TeleHealth encounter, evaluation of the following organ systems is limited: Vitals/Constitutional/EENT/Resp/CV/GI//MS/Neuro/Skin/Heme-Lymph-Imm. Lab Results   Component Value Date     01/02/2021    K 3.6 01/02/2021    K 2.5 12/30/2020     01/02/2021    CO2 25 01/02/2021    BUN 6 01/02/2021    CREATININE 0.9 01/02/2021    GLUCOSE 120 01/02/2021    CALCIUM 7.6 01/02/2021     Lab Results   Component Value Date    WBC 4.5 01/02/2021    WBC 4.5 01/01/2021    WBC 7.1 12/31/2020    HGB 11.3 01/02/2021    HGB 11.1 01/01/2021    HGB 12.1 12/31/2020    HCT 35.0 01/02/2021    HCT 33.2 01/01/2021    HCT 35.7 12/31/2020    MCV 89.1 01/02/2021    MCV 87.6 01/01/2021    MCV 85.8 12/31/2020     01/02/2021     01/01/2021     12/31/2020     Lab Results   Component Value Date    CHOL 106 12/30/2020    TRIG 99 12/30/2020    HDL 32 12/30/2020       Assessment/Plan:  Entire hospital admission record including all lab results, all CT scan results were reviewed and discussed with patient and patient's son    Problem assessment today  Ongoing diarrhea  Fatigue  Nausea  Dehydration  General weakness and advanced age  7-8  Times per day  All gi evaluation as above  EGD and cscope as above  Fecal split fats increased  She has been taking cipro + flagyl since discharge  Recommendations today  1. Discontinue Cipro and Flagyl  2. Decrease Imodium that she has been taking 4 times daily to twice daily since it is not really helping her symptoms  3. Increase fluid intake  4.   Trial of Colestid 1 g twice daily 5.  Appointment with gastroenterology changed to 1/12/2021 further recommendations with this ongoing diarrheal illness  6. Plan repeating labs 1/12/2021    Pancreatic cyst on recent CT scan    Multiple scattered cystic lesions of the pancreas may represent cysts, intraductal papillary mucinous neoplasms, or cystic distention pancreatic duct  Diagnostic test results reviewed  Patient has appointment with GI for opinion    Stage III chronic kidney disease  Lab testing done during recent admission shows stable GFR    Hypertension  According to blood pressure readings that patient has been taking at home blood pressure has been good range  Continue current Coreg and current Benicar    Since patient's son reports of increased weakness symptoms  Recommend patient son to call cardiology for follow-up appointment since patient has no history of CHF/atrial fibrillation on multiple medications from her cardiologist      Patient risk of morbidity and mortality:moderate    This is high complexity TCM visit  Patient was called within 2 business days of discharge    Family able to provide care to patient at home  No other needs including PT, OT needs detected at this time  additional provider follow-ups needed at this time-gastroenterology 1/12/2021    Orders Placed This Encounter   Procedures    CBC Auto Differential     Standing Status:   Future     Standing Expiration Date:   1/7/2022    Comprehensive Metabolic Panel     Standing Status:   Future     Standing Expiration Date:   1/7/2022       No follow-ups on file.

## 2021-01-12 PROBLEM — R19.5 DARK STOOLS: Status: ACTIVE | Noted: 2021-01-01

## 2021-01-12 PROBLEM — K52.9 ENTEROCOLITIS: Status: ACTIVE | Noted: 2021-01-01

## 2021-01-12 PROBLEM — N18.9 ACUTE-ON-CHRONIC KIDNEY INJURY (HCC): Status: ACTIVE | Noted: 2021-01-01

## 2021-01-12 PROBLEM — N39.0 UTI (URINARY TRACT INFECTION): Status: ACTIVE | Noted: 2021-01-01

## 2021-01-12 PROBLEM — R63.8 POOR FLUID INTAKE: Status: ACTIVE | Noted: 2021-01-01

## 2021-01-12 PROBLEM — N17.9 ACUTE-ON-CHRONIC KIDNEY INJURY (HCC): Status: ACTIVE | Noted: 2021-01-01

## 2021-01-12 PROBLEM — J98.11 MILD BIBASILAR ATELECTASIS: Status: ACTIVE | Noted: 2021-01-01

## 2021-01-12 PROBLEM — I95.9 HYPOTENSION: Status: ACTIVE | Noted: 2021-01-01

## 2021-01-12 NOTE — PROGRESS NOTES
Clinical Pharmacy Progress Note    Intravenous levothyroxine has been ordered for patient. Per IV levothyroxine protocol, order has been verified by pharmacist with a start date 6 days from today. Until that time, chart will be reviewed by pharmacist daily, and if patient able to tolerate PO medications, the levothyroxine order will be converted to PO at appropriate dose conversion.      Orders for IV levothyroxine will not be deferred in the following cases:              Indication of myxedema coma  Patient awaiting harvest of organs for donation    Criteria for IV to PO conversion:  Diet ordered and patient tolerating (no nausea, vomiting, diarrhea)  Other PO meds are being administered         For questions about this protocol, please call pharmacy @ [phone]    Electronically signed by Nik Shipman RPh, DOLLY, 1/12/2021,3:56 PM

## 2021-01-12 NOTE — ED PROVIDER NOTES
[facility] EMERGENCY DEPT  eMERGENCY dEPARTMENT eNCOUnter      Pt Name: Pro Adhikari  MRN: [id number]  [place] 5/3/1925  Date of evaluation: 1/12/2021  Provider: Nehal Redman MD    [address] [place] [address]       Chief Complaint   Patient presents with    Abdominal Pain         HISTORY OF PRESENT ILLNESS   (Location/Symptom, Timing/Onset,Context/Setting, Quality, Duration, Modifying Factors, Severity)  Note limiting factors. Pro Adhikari is a 80 y.o. female who presents to the emergency department with hypotension abdominal distention and hematemesis. Patient was admitted to the hospital on December 29 with fatigue and diarrhea for several weeks. She had a colonoscopy and EGD. She had a polyp but otherwise unremarkable imaging, biopsies sent. Patient was slightly lethargic per EMS with a blood pressure of 87 systolic but her mental status improved after 600 cc of IV fluids and blood pressure improving to the 90s. She is on Xarelto for history of atrial fibrillation. Patient is hard of hearing and a poor historian. No family currently at bedside. Granddaughter at bedside. Had recent episode of SOA and vomiting but has been eating virtually nothing. Reports early satiety and everything tastes too sweet. Will discuss goals of care with father. HPI    NursingNotes were reviewed. REVIEW OF SYSTEMS    (2-9 systems for level 4, 10 or more for level 5)     Review of Systems   Constitutional: Positive for activity change, appetite change and fatigue. Negative for chills and fever. HENT: Negative for rhinorrhea and sore throat. Respiratory: Negative for shortness of breath. Cardiovascular: Negative for chest pain and leg swelling. Gastrointestinal: Positive for abdominal distention, abdominal pain, blood in stool, diarrhea, nausea and vomiting. Genitourinary: Negative for difficulty urinating. Musculoskeletal: Negative for back pain and neck pain. Skin: Negative for rash. Neurological: Positive for weakness. Negative for headaches. Psychiatric/Behavioral: Negative for confusion. A complete review of systems was performed and is negative except as noted above in the HPI.        PAST MEDICAL HISTORY     Past Medical History:   Diagnosis Date    A-fib ([address]     CPAP (continuous positive airway pressure) dependence     9cm    DM (diabetes mellitus screen)     Hyperlipidemia     Hypertension     Hyperthyroidism     Left carotid bruit     Macular degeneration     Obstructive sleep apnea     AHI:  15.3         SURGICAL HISTORY       Past Surgical History:   Procedure Laterality Date    APPENDECTOMY      BLADDER SURGERY      CHOLECYSTECTOMY      COLONOSCOPY N/A 01/01/2021    Dr Kern-Uncomplicated sigmoid diverticulosis, possible rectal incontinence contributing to diarrhea-AP x 1, BCM x 1    PACEMAKER PLACEMENT      PAT AND BSO      UPPER GASTROINTESTINAL ENDOSCOPY N/A 01/01/2021    Dr Kern-Uncomplicated hiatal hernia, no active ulcer disease or esophagitis-Duodenitis         CURRENT MEDICATIONS       Previous Medications    AMLODIPINE (NORVASC) 10 MG TABLET    TAKE ONE TABLET BY MOUTH EVERY DAY    BLOOD GLUCOSE TEST STRIPS (FREESTYLE LITE) STRIP    USE ONE STRIP ONCE DAILY    CARVEDILOL (COREG) 25 MG TABLET    TAKE ONE TABLET BY MOUTH TWICE A DAY WITH MEALS    CHOLECALCIFEROL (VITAMIN D3) 50 MCG (2000 UT) CAPS    Take by mouth    COLESTIPOL (COLESTID) 1 G TABLET    Take 1 tablet by mouth 2 times daily    DRONEDARONE HCL (MULTAQ) 400 MG TABS    TAKE ONE TABLET BY MOUTH TWICE A DAY WITH MORNING AND EVENING MEAL    FUROSEMIDE (LASIX) 40 MG TABLET    TAKE ONE TABLET BY MOUTH EVERY DAY AND EXTRA TABLET TWICE WEEKLY AS NEEDED    LEVOTHYROXINE (SYNTHROID) 75 MCG TABLET    TAKE ONE TABLET BY MOUTH EVERY DAY    LOPERAMIDE (IMODIUM) 2 MG CAPSULE    Take 1 capsule by mouth 4 times daily as needed for Diarrhea Forced sexual activity: Not on file   Other Topics Concern    Not on file   Social History Narrative    Not on file       SCREENINGS    [place] Coma Scale  Eye Opening: Spontaneous  Best Verbal Response: Oriented  Best Motor Response: Obeys commands  [place] Coma Scale Score: 15        PHYSICAL EXAM    (up to 7 for level 4, 8 or more for level 5)     ED Triage Vitals [01/12/21 0846]   BP Temp Temp src Pulse Resp SpO2 Height Weight   (!) 91/38 -- -- 67 14 94 % -- --       Physical Exam  Vitals signs and nursing note reviewed. Constitutional:       General: She is not in acute distress. Appearance: She is well-developed. She is not diaphoretic. HENT:      Head: Normocephalic and atraumatic. Comments: Dried coffee ground emesis on face  Eyes:      Pupils: Pupils are equal, round, and reactive to light. Neck:      Musculoskeletal: Normal range of motion and neck supple. Cardiovascular:      Rate and Rhythm: Normal rate and regular rhythm. Heart sounds: Normal heart sounds. Pulmonary:      Effort: Pulmonary effort is normal. No respiratory distress. Breath sounds: Normal breath sounds. Abdominal:      General: Bowel sounds are normal. There is no distension. Palpations: Abdomen is soft. Tenderness: There is no abdominal tenderness. Genitourinary:     Rectum: Guaiac result positive. Comments: Dark stool  Musculoskeletal: Normal range of motion. Skin:     General: Skin is warm and dry. Findings: No rash. Neurological:      Mental Status: She is alert and oriented to person, place, and time. Cranial Nerves: No cranial nerve deficit. Motor: No abnormal muscle tone.       Coordination: Coordination normal.   Psychiatric:         Behavior: Behavior normal.         DIAGNOSTIC RESULTS     EKG: All EKG's are interpreted by the Emergency Department Physician who either signs or Co-signs this chart in the absence of a cardiologist. Paced rhythm anterior and inferior q waves    RADIOLOGY:   Non-plain film images such as CT, Ultrasound and MRI are read by the radiologist. Leonore Bigger images are visualized and preliminarily interpreted by the emergency physician with the below findings:        Interpretation per the Radiologist below, if available at the time of this note:    CT ABDOMEN PELVIS WO CONTRAST Additional Contrast? None   Final Result   1. Abnormal bowel gas pattern with fluid-filled small bowel and colon   with multiple air-fluid levels. Findings would suggest an ongoing   enterocolitis. No evidence of mechanical obstruction or focal bowel   wall thickening. 2. Diverticulosis of the descending and sigmoid colon without evidence   of acute diverticulitis. 3. Trace right-sided and small left-sided pleural effusion. There is   bibasilar atelectasis. 4. Nonobstructing calculus lower pole calyx right kidney. No evidence   of obstructive uropathy. 5. The patient's undergone prior hysterectomy, cholecystectomy and   appendectomy. Moderate extrahepatic biliary dilatation is likely   related to reservoir effect and is stable from the previous exam.   6. Multiple areas of cystic change within the pancreatic gland. This   may represent pancreatic ductal dilatation given the distribution. Complications of previous pancreatitis or mucinous cystic neoplasm   should also be considered. These are stable from the previous exam.   There is no evidence of pancreatitis. .    Signed by Dr Polo Gregg on 1/12/2021 11:39 AM      XR CHEST PORTABLE   Final Result   Left lower lobar consolidation and left basal pleural   effusion.    Signed by Dr Diego Baez on 1/12/2021 9:34 AM            ED BEDSIDE ULTRASOUND:   Performed by ED Physician - none    LABS:  Labs Reviewed   CBC WITH AUTO DIFFERENTIAL - Abnormal; Notable for the following components:       Result Value    WBC 4.5 (*)     RBC 3.65 (*)     Hemoglobin 10.6 (*) Hematocrit 32.2 (*)     MCHC 32.9 (*)     RDW 17.3 (*)     Lymphocytes % 13.0 (*)     Lymphocytes Absolute 0.6 (*)     Bands Relative 26 (*)     Anisocytosis 1+ (*)     Polychromasia 1+ (*)     Poikilocytes 2+ (*)     [place] Cells 1+ (*)     Ovalocytes 1+ (*)     All other components within normal limits   COMPREHENSIVE METABOLIC PANEL - Abnormal; Notable for the following components:    Sodium 133 (*)     Potassium 2.5 (*)     CO2 13 (*)     Glucose 143 (*)     CREATININE 1.6 (*)     GFR Non- 30 (*)     GFR  36 (*)     Calcium 6.8 (*)     Total Protein 4.8 (*)     Alb 2.3 (*)     All other components within normal limits    Narrative:     CALL  Yamil ISSA tel. ,  Chemistry results called to and read back by Darrell Chakraborty RN in ED, 01/12/2021  09:40, by BRANDEN   LIPASE - Abnormal; Notable for the following components:    Lipase 9 (*)     All other components within normal limits   TROPONIN - Abnormal; Notable for the following components:    Troponin 0.08 (*)     All other components within normal limits   URINE RT REFLEX TO CULTURE - Abnormal; Notable for the following components:    Ketones, Urine 15 (*)     Blood, Urine MODERATE (*)     Protein, UA 30 (*)     Nitrite, Urine POSITIVE (*)     Leukocyte Esterase, Urine TRACE (*)     All other components within normal limits   MAGNESIUM - Abnormal; Notable for the following components:    Magnesium 1.6 (*)     All other components within normal limits   BRAIN NATRIURETIC PEPTIDE - Abnormal; Notable for the following components:    Pro-BNP 1,866 (*)     All other components within normal limits   CALCIUM, IONIZED - Abnormal; Notable for the following components:    Calcium, Ion 0.98 (*)     All other components within normal limits   PROTIME-INR - Abnormal; Notable for the following components:    Protime >120.0 (*)     INR >18.80 (*)     All other components within normal limits    Narrative:     CALL  Yamil ISSA tel. , Coag results called to and read back by Александр Mendoza rn er, 01/12/2021 10:31, by  Queenie Ricardo   APTT - Abnormal; Notable for the following components:    aPTT 49.7 (*)     All other components within normal limits   MICROSCOPIC URINALYSIS - Abnormal; Notable for the following components:    Hyaline Casts, UA 21-30 (*)     RBC, UA 3-5 (*)     Bacteria, UA 4+ (*)     All other components within normal limits   CULTURE, BLOOD 1   CULTURE, BLOOD 2   CULTURE, URINE   C DIFF TOXIN/ANTIGEN   LACTIC ACID, PLASMA   COVID-19   TYPE AND SCREEN   PREPARE FRESH FROZEN PLASMA   TYPE AND SCREEN   PREPARE FRESH FROZEN PLASMA       All other labs were within normal range or not returned as of this dictation. EMERGENCY DEPARTMENT COURSE and DIFFERENTIALDIAGNOSIS/MDM:   Vitals:    Vitals:    01/12/21 1242 01/12/21 1255 01/12/21 1301 01/12/21 1303   BP: (!) 71/38  (!) 73/37 (!) 73/37   Pulse: 60 60 59 56   Resp: 13 13 12 11   Temp:   97 °F (36.1 °C)    SpO2: 98% 99% 97% 98%   Weight:       Height:           MDM  Long discussion had with pt's son, granddaughter, grandson regarding central line, pressors, code status, goals of care given she is not eating at all and on 3rd visit. They want to continue IVF and abx. They do not want central line at this time. She has previously said she wants to live as long as God will let her and this has been interpreted to mean she wants to be full code. Unknown reason for her coagulopathy. She is on xarelto for afib. She has some dried coffee ground looking emesis on her mouth. She had some dark stool that was guaiac positive. She was given vitamin K and FFP. She has been given IVF and abx. BP remains 71 systolic at this time. Possible eventual hospice but family not ready for this at this time. CONSULTS:  PHARMACY TO DOSE VANCOMYCIN    PROCEDURES:  Unless otherwise notedbelow, none     Procedures    FINAL IMPRESSION     1.  Coagulopathy ([address] 2. Gastrointestinal hemorrhage, unspecified gastrointestinal hemorrhage type    3. Diarrhea, unspecified type    4. Hypotension due to hypovolemia    5. CIELO (acute kidney injury) ([facility] [address]    6. Hypocalcemia    7. Hypokalemia    8.  Hypomagnesemia          DISPOSITION/PLAN   DISPOSITION        PATIENT REFERRED TO:  @FUP@    DISCHARGE MEDICATIONS:  New Prescriptions    No medications on file          (Please note that portions of this note were completed with a voice recognition program.  Efforts were made to edit the dictations butoccasionally words are mis-transcribed.)    Pastora Mg MD (electronically signed)  AttendingEmergency Physician         Pastora Mg MD  01/12/21 6965

## 2021-01-12 NOTE — H&P
Hospitalist: History and Physical    Date: 2021 Time: 3:34 PM    Name: Crispin Bryant : 5/3/1925 MRN: [id number]    Code Status: Full Code No additional code details    PCP: Malik Alexandre MD    Patient's Chief Complaint Is:   weakness    HPI:    66-year-old female with permanent A. fib on Xarelto, CAD, chronic diastolic heart failure, ROSALES on CPAP, diabetes mellitus, hypertension, hyperlipidemia, hearing impaired, recently hospitalized  found to have enterocolitis and treated on broad-spectrum antibiotics, evaluated by GI for chronic diarrheastool culture negative and C. difficile negative. Had EGD on  showing uncomplicated hiatal hernia; duodenal biopsies taken to rule out celiac disease.  Colonoscopy performed 1319 showing uncomplicated sigmoid diverticulosis, a small polyp at 95 cm which was removed; colon biopsies were performed to rule out microscopic colitis. Patient discharged home on 2021 and was scheduled to follow-up with GI as an outpatient. She presents back with poor p.o. intake with generalized weakness, as well as ongoing loose stools, with dark stools recently. Following recent discharge, her PCP started her on colestipol with some improvement in diarrhea, but she continues to have several loose stools daily. She has family that help her at home, including son who is POA and granddaughter at bedside. Unable to obtain history from patient as she is hearing impaired and does not have hearing aids in. Her generalized weakness has worsened to the point that family was unable to help her off the toilet earlier today. No acute change in mental status. No nausea, vomiting hematemesis, coffee-ground emesis, abdominal pain, or hematochezia. The ED, noted to be hypotensive with blood pressure dropping down into the 24N systolic despite 2 L of IV fluid. Also with some acute on chronic kidney disease with creatinine up to 1.6 (baseline around 0.9), hypokalemia with K2.5, magnesium 1.6, hypocalcemia CA 6.8 (ionized 0.98), mild troponin elevation of 0.08 but no chest pain or shortness of breath. Supratherapeutic INR over 18 and given vitamin K.  H&H 10.6/32.2.,  Mild drop from H&H on discharge 1/2/2021. Active urine sediment on UA concerning for UTI. CT abdomen/pelvis shows air-fluid levels findings concerning for ongoing enterocolitis. Chest x-ray shows left lower lobe consolidation with left basilar pleural effusion, however CT indicates bibasilar atelectasis. Past Medical History:   Diagnosis Date    A-fib ([address]     CPAP (continuous positive airway pressure) dependence     9cm    DM (diabetes mellitus screen)     Hyperlipidemia     Hypertension     Hyperthyroidism     Left carotid bruit     Macular degeneration     Obstructive sleep apnea     AHI:  15.3     Past Surgical History:   Procedure Laterality Date    APPENDECTOMY      BLADDER SURGERY      CHOLECYSTECTOMY      COLONOSCOPY N/A 01/01/2021    Dr Kern-Uncomplicated sigmoid diverticulosis, possible rectal incontinence contributing to diarrhea-AP x 1, BCM x 1    PACEMAKER PLACEMENT      PAT AND BSO      UPPER GASTROINTESTINAL ENDOSCOPY N/A 01/01/2021    Dr Kern-Uncomplicated hiatal hernia, no active ulcer disease or esophagitis-Duodenitis     No family history on file. Social History     Socioeconomic History    Marital status:       Spouse name: Not on file    Number of children: Not on file    Years of education: Not on file    Highest education level: Not on file   Occupational History    Not on file   Social Needs    Financial resource strain: Patient refused    Food insecurity     Worry: Patient refused     Inability: Patient refused  Transportation needs     Medical: Patient refused     Non-medical: Patient refused   Tobacco Use    Smoking status: Never Smoker    Smokeless tobacco: Never Used   Substance and Sexual Activity    Alcohol use: No    Drug use: Not on file    Sexual activity: Not on file   Lifestyle    Physical activity     Days per week: Not on file     Minutes per session: Not on file    Stress: Not on file   Relationships    Social connections     Talks on phone: Not on file     Gets together: Not on file     Attends Advent service: Not on file     Active member of club or organization: Not on file     Attends meetings of clubs or organizations: Not on file     Relationship status: Not on file    Intimate partner violence     Fear of current or ex partner: Not on file     Emotionally abused: Not on file     Physically abused: Not on file     Forced sexual activity: Not on file   Other Topics Concern    Not on file   Social History Narrative    Not on file     No Known Allergies  Prior to Admission medications    Medication Sig Start Date End Date Taking?  Authorizing Provider   carvedilol (COREG) 25 MG tablet TAKE ONE TABLET BY MOUTH TWICE A DAY WITH MEALS 12/22/20  Yes Michael Ferrari MD   levothyroxine (SYNTHROID) 75 MCG tablet TAKE ONE TABLET BY MOUTH EVERY DAY 12/22/20  Yes Michael Ferrari MD   amLODIPine (NORVASC) 10 MG tablet TAKE ONE TABLET BY MOUTH EVERY DAY 12/22/20  Yes Michael Ferrari MD   furosemide (LASIX) 40 MG tablet TAKE ONE TABLET BY MOUTH EVERY DAY AND EXTRA TABLET TWICE WEEKLY AS NEEDED 12/22/20  Yes Michael Ferrari MD   colestipol (COLESTID) 1 g tablet Take 1 tablet by mouth 2 times daily 1/7/21   Michael Ferrari MD   ondansetron [facility]) 4 MG tablet Take 1 tablet by mouth every 6-8 hours as needed for Nausea or Vomiting 1/5/21   Michael Ferrari MD   loperamide (IMODIUM) 2 MG capsule Take 1 capsule by mouth 4 times daily as needed for Diarrhea 1/2/21 1/12/21  Neeraj España MD dronedarone hcl (MULTAQ) 400 MG TABS TAKE ONE TABLET BY MOUTH TWICE A DAY WITH MORNING AND EVENING MEAL 12/22/20   Diamantina Hashimoto, MD   pravastatin (PRAVACHOL) 40 MG tablet TAKE ONE TABLET BY MOUTH EVERY EVENING 12/22/20   Diamantina Hashimoto, MD   olmesartan (BENICAR) 40 MG tablet TAKE ONE TABLET BY MOUTH EVERY DAY 12/22/20   Diamantina Hashimoto, MD   rivaroxaban (XARELTO) 15 MG TABS tablet TAKE ONE TABLET BY MOUTH EVERY DAY  Patient taking differently: daily TAKE ONE TABLET BY MOUTH EVERY DAY 12/22/20   Diamantina Hashimoto, MD   ranolazine (RANEXA) 500 MG extended release tablet TAKE ONE TABLET BY MOUTH TWICE A DAY 12/22/20   Diamantina Hashimoto, MD   potassium chloride (KLOR-CON M) 20 MEQ extended release tablet TAKE ONE TABLET BY MOUTH ONCE DAILY 12/22/20   Diamantina Hashimoto, MD   blood glucose test strips (FREESTYLE LITE) strip USE ONE STRIP ONCE DAILY 12/22/20   Diamantina Hashimoto, MD   Cholecalciferol (VITAMIN D3) 50 MCG (2000 UT) CAPS Take by mouth    Historical Provider, MD ALEXIS have reviewed all pertinent history. Prior medical records and laboratory evaluation reviewed. Imaging independently reviewed. Review of Systems:   Unable to complete full ROS as patient hearing impaired    Physical Exam:  Vitals:    01/12/21 1454   BP:    Pulse: 56   Resp: 15   Temp:    SpO2: 98%     CONSTITUTIONAL: No acute distress  PSYCH: Normal mood  HEENT: Normocephalic/atraumatic, dry mucous membranes  NECK: Trachea is midline. Thyroid is non-tender. LYMPHATIC: No anterior cervical or axillary lymphadenopathy     LUNGS: Normal respiratory effort, no intercostal retractions or accessory muscle use. CARDIOVASCULAR: Irregularly irregular rhythm, pulses are equal bilaterally. GI/ABDOMEN: Soft, non-tender, non-distended, no guarding or rebound. Bowel sounds are normal.   SKIN: Warm and dry.    NEUROLOGICAL: No focal deficits  EXTREMITIES: No edema    Labs:   Recent Results (from the past 72 hour(s))   TYPE AND SCREEN    Collection Time: 01/12/21  9:00 AM Result Value Ref Range    ABO/Rh A POS     Antibody Screen NEG    PREPARE FRESH FROZEN PLASMA, 1 Units    Collection Time: 01/12/21  9:00 AM   Result Value Ref Range    Product Code Blood Bank [id number]     Description Blood Bank Plasma, Apheresis, 5 Day, Thawed     Unit Number [id number]     Dispense Status Blood Bank issued    PREPARE FRESH FROZEN PLASMA, 2 Units    Collection Time: 01/12/21  9:00 AM   Result Value Ref Range    Product Code Blood Bank [id number]     Description Blood Bank Plasma, 5 Day, Thawed     Unit Number [id number]     Dispense Status Blood Bank selected    CBC Auto Differential    Collection Time: 01/12/21  9:04 AM   Result Value Ref Range    WBC 4.5 (L) 4.8 - 10.8 K/uL    RBC 3.65 (L) 4.20 - 5.40 M/uL    Hemoglobin 10.6 (L) 12.0 - 16.0 g/dL    Hematocrit 32.2 (L) 37.0 - 47.0 %    MCV 88.2 81.0 - 99.0 fL    MCH 29.0 27.0 - 31.0 pg    MCHC 32.9 (L) 33.0 - 37.0 g/dL    RDW 17.3 (H) 11.5 - 14.5 %    Platelets 988 748 - 505 K/uL    MPV 9.4 9.4 - 12.3 fL    PLATELET SLIDE REVIEW Adequate     Neutrophils % 55.0 50.0 - 65.0 %    Lymphocytes % 13.0 (L) 20.0 - 40.0 %    Monocytes % 6.0 0.0 - 10.0 %    Eosinophils % 0.0 0.0 - 5.0 %    Basophils % 0.0 0.0 - 1.0 %    Neutrophils Absolute 3.6 1.5 - 7.5 K/uL    Immature Granulocytes # 0.0 K/uL    Lymphocytes Absolute 0.6 (L) 1.1 - 4.5 K/uL    Monocytes Absolute 0.30 0.00 - 0.90 K/uL    Eosinophils Absolute 0.00 0.00 - 0.60 K/uL    Basophils Absolute 0.00 0.00 - 0.20 K/uL    Bands Relative 26 (H) 0 - 5 %    Anisocytosis 1+ (A)     Polychromasia 1+ (A)     Poikilocytes 2+ (A)     [place] Cells 1+ (A)     Ovalocytes 1+ (A)    Comprehensive Metabolic Panel    Collection Time: 01/12/21  9:04 AM   Result Value Ref Range    Sodium 133 (L) 136 - 145 mmol/L    Potassium 2.5 (LL) 3.5 - 5.0 mmol/L    Chloride 107 98 - 111 mmol/L    CO2 13 (L) 22 - 29 mmol/L    Anion Gap 13 7 - 19 mmol/L    Glucose 143 (H) 74 - 109 mg/dL    BUN 14 8 - 23 mg/dL RBC, UA 3-5 (A) 0 - 2 /HPF    Epithelial Cells, UA 3-5 /HPF    Bacteria, UA 4+ (A) None Seen /HPF   Protime-INR    Collection Time: 01/12/21 10:03 AM   Result Value Ref Range    Protime >120.0 (HH) 12.0 - 14.6 sec    INR >18.80 (HH) 0.88 - 1.18   APTT    Collection Time: 01/12/21 10:03 AM   Result Value Ref Range    aPTT 49.7 (H) 26.0 - 36.2 sec   Calcium, Ionized    Collection Time: 01/12/21 10:26 AM   Result Value Ref Range    Calcium, Ion 0.98 (L) 1.12 - 1.32 mmol/L   COVID-19    Collection Time: 01/12/21 11:54 AM   Result Value Ref Range    SARS-CoV-2, NAAT Not Detected Not Detected       Imaging: Ct Abdomen Pelvis Wo Contrast Additional Contrast? None    Result Date: 1/12/2021 CT ABDOMEN PELVIS WO CONTRAST 1/12/2021 10:12 AM HISTORY: Vomiting with abdominal pain, distention and hypotension COMPARISON: 12/29/2020 DLP: 1842 mGy cm. Automated exposure control was utilized to diminish patient radiation dose. TECHNIQUE: Noncontrast enhanced images of the abdomen and pelvis obtained without oral contrast. FINDINGS: There is a small amount of air projecting along the anterior margin of the cardiac silhouette suspected to represent a small amount of air entrapped within the right atrium-likely related to intravenous infusion. Coronary artery calcifications are present. A transvenous pacer is in place. A small left-sided and trace right-sided effusion are observed. Bibasilar atelectasis is present. A small hiatal hernia is present. Roberto Savant LIVER: The 7 mm hypodensity previously described within the left lobe of the liver is stable. No additional hepatic lesions are demonstrated. Roberto [facility]ant BILIARY SYSTEM: The gallbladder has been surgically removed. There is moderate extrahepatic biliary dilatation unchanged from the previous exam.. PANCREAS: Multiple areas of cystic change are noted within the pancreas similar in appearance to the previous examination. May represent pancreatic ductal dilatation versus complications of previous pancreatitis or mucinous cystic neoplasm. Continued follow-up will be suggested. No radiographic evidence of acute pancreatitis. . SPLEEN: Unremarkable. KIDNEYS AND ADRENALS: The adrenals are unremarkable. There is a nonobstructing calculus involving the lower pole calyx of the right kidney. .  The ureters are decompressed and normal in appearance. RETROPERITONEUM: There is heavy atheromatous calcification of the abdominal aorta. No evidence of aneurysm. No evidence of retroperitoneal hematoma or adenopathy. Howes Savant GI TRACT: There is an abnormal bowel gas pattern with multiple fluid-filled loops of small bowel and colon with air-fluid levels. Findings would suggest an ongoing enterocolitis.  There is diverticulosis of the descending and sigmoid colon without evidence of acute diverticulitis. . The appendix is surgically absent. . OTHER: There is no mesenteric mass, lymphadenopathy or fluid collection. The osseous structures and soft tissues demonstrate no worrisome lesions. A chronic compression deformity is noted of the upper endplate of L4. PELVIS: The patient's undergone prior hysterectomy. There is no evidence of pelvic mass or adenopathy. . A Mims catheter is in place within the bladder with decompression. .    1. Abnormal bowel gas pattern with fluid-filled small bowel and colon with multiple air-fluid levels. Findings would suggest an ongoing enterocolitis. No evidence of mechanical obstruction or focal bowel wall thickening. 2. Diverticulosis of the descending and sigmoid colon without evidence of acute diverticulitis. 3. Trace right-sided and small left-sided pleural effusion. There is bibasilar atelectasis. 4. Nonobstructing calculus lower pole calyx right kidney. No evidence of obstructive uropathy. 5. The patient's undergone prior hysterectomy, cholecystectomy and appendectomy. Moderate extrahepatic biliary dilatation is likely related to reservoir effect and is stable from the previous exam. 6. Multiple areas of cystic change within the pancreatic gland. This may represent pancreatic ductal dilatation given the distribution. Complications of previous pancreatitis or mucinous cystic neoplasm should also be considered. These are stable from the previous exam. There is no evidence of pancreatitis. . Signed by Dr Pollo Nick on 1/12/2021 11:39 AM    Ct Abdomen Pelvis Wo Contrast Additional Contrast? None    Result Date: 12/29/2020 CT abdomen and pelvis 12/29/2020 3:44 PM HISTORY: Diarrhea TECHNIQUE: Axial images of the abdomen and pelvis were obtained without IV contrast. Coronal and sagittal reformatted images are reconstructed and reviewed. COMPARISON: 12/23/2020. DLP: 1283 mGy cm Automated exposure control was utilized to minimize patient radiation dose. FINDINGS: A 7 mm hypodensity in the left hepatic lobe similar and favorable for an incidental cyst. Spleen remains similar in size and appearance. Accessory splenule seen inferior to the splenic hilum on image 21. There are scattered cystic foci the pancreas is which may be pancreatic cysts, intraductal papillary mucinous neoplasms, or cystic dilatation of pancreatic duct. Overall appearance is similar to the recent exam. No peripancreatic inflammation. Cholecystectomy clips. No suspicious adrenal nodule. No abnormal perinephric fluid collection. No hydronephrosis. Latter underdistended. Uterus absent. There is fluid contained throughout the small and large bowel. There is no radiographic evidence of bowel obstruction. No free air or abscess. Stomach is under distended. Moderate size hiatal hernia. Few sigmoid diverticuli with no adjacent inflammation. There is2 diffuse vascular calcification with no aneurysm. Cardiac pacer device. Myocardium likely. Trace pericardial fluid. No pleural effusions. Visible lung bases are unremarkable. Osteopenia and degenerative change of the regional skeleton. Chronic compression of superior plate of L4. Levoscoliotic curvature of the lumbar spine. Examination. CT ABDOMEN PELVIS WO CONTRAST 12/23/2020 1:50 PM History: History of diarrhea. DLP: 1057 mGycm. The CT scan of the abdomen and pelvis is performed without intravenous contrast enhancement. The images are acquired in axial plane with subsequent reconstruction in coronal and sagittal plane. There is no previous study for comparison. The lung bases included in the study show scattered areas of scarring and a few calcified granulomas. The limited included cardiomediastinal structures show atheromatous changes of the coronary arteries. There is a moderate size hiatal hernia. Unenhanced liver and spleen appear unremarkable. The gallbladder is surgically absent. There is no significant dilatation of common bile duct. There is a low-density area located in the head of the pancreas adjacent and lateral to the distal superior mesenteric vein measuring 1.5 cm in diameter. The CT density suggest a cyst. The remaining pancreas with diffuse nodular. The possibility of ductal dilatation is not excluded. There are degenerative glands bilaterally are normal. Moderate lobulation of renal contour bilaterally seen. There is a 3 mm radiopaque calculus in the lower pole of the right kidney. No calculi in the left kidney. No hydronephrosis on either side. The limited visualized ureters are unremarkable. The urinary bladder is moderately well distended. There is a tiny air bubble adjacent to the anterior wall of the urinary bladder which may be due to instrumentation or infection/inflammation. The uterus is surgically absent. No adnexal masses. The stomach is unremarkable. The duodenum is normal. Nondistended fluid-filled small bowel loops are seen. Appendix is surgically absent. Fluid-filled moderately dilated large bowel loops are seen. There are air-fluid levels. Fluid/liquid fecal material is seen in the sigmoid colon and rectum. No finding to suggest obstruction. There is diverticulosis of the distal colon.  No finding to EXAMINATION: CT CHEST WO CONTRAST 12/30/2020 8:35 AM HISTORY: Paratracheal mass Comparison: Chest x-ray 12/29/2020 Technique: Sequential imaging was performed from the thoracic inlet through the upper abdomen without the use of IV contrast. Sagittal and coronal reformations were made from the original source data and reviewed. Automated exposure control was utilized for radiation dose reduction. Radiation dose:  mGy-cm Findings: a low-density lesion is seen in the right thyroid lobe measuring 7 mm in size. Thyroid gland is otherwise unremarkable. The trachea and main bronchi appear widely patent and in normal anatomic position. The esophagus is grossly normal in appearance. There is a small hiatal hernia. A left subclavian approach dual lead cardiac pacing device is in place with tips adjacent to the right atrium and right ventricle. No pathologically enlarged axillary, mediastinal, or hilar lymph nodes are identified when allowing for limitations of a noncontrast exam. Heart appears normal in size. There is trace pericardial fluid. Coronary artery calcifications are present. Atherosclerotic calcifications are seen in the aorta and its branch vessels. The ascending thoracic aorta and main pulmonary artery appear normal in caliber. Prominence of the right paratracheal region appears to be related to the underlying SVC. There are dependent changes in the lung bases. There is some nodularity in the left lung base adjacent to the hemidiaphragm, presumably atelectasis. There is a calcified granuloma in the left lower lobe. There is a small noncalcified nodule in the right lower lobe adjacent to the hemidiaphragm measuring 4 mm (axial image 94). There is a groundglass nodule measuring 6 mm in the peripheral right lower lobe (axial image 81). An additional 4 mm groundglass nodule is seen in the superior right lower lobe (axial image 62).  Several additional small noncalcified groundglass nodules are seen in the peripheral right lower lobe (axial images 83-90) additional tiny groundglass nodules are seen throughout the right upper lobe. Review of the visualized portion of the upper abdomen demonstrates nonobstructive right renal stone. There is a nodule in the upper abdomen adjacent to the hemidiaphragm measuring 7 mm in size. There is intra and extrahepatic biliary ductal dilatation status post cholecystectomy. Multiple cystic lesions are noted throughout the pancreas which are not well evaluated by CT. There is a small right adrenal nodule. Review of the visualized osseous structures demonstrates no acute or aggressive lesions. Old right-sided rib fractures are present. Patient appears osteopenic. There are degenerative changes in the spine. Impression: 1. No evidence of a right paratracheal mass. 2. Multiple groundglass nodules bilaterally suggest an inflammatory process. 3. Additional noncalcified pulmonary nodules are present. If patient is considered high risk, follow-up CT should be considered in 12 months per Fleischner Society guidelines. If the patient is considered low risk, no follow-up is recommended. 4. Atherosclerosis of the aorta and coronary arteries. 5. Small hiatal hernia. 6. Multiple pancreatic lesions are incompletely evaluated. Consider follow-up nonemergent MRI abdomen with and without contrast with MRCP.  Signed by Dr Marcie Peters on 12/30/2020 8:51 AM    Us Renal Complete    Result Date: 12/30/2020 Examination. US RENAL COMPLETE 12/30/2020 6:12 AM History: Acute renal injury. The ultrasound examination of the kidneys bilaterally is performed. There is no previous study for comparison. The study is of limited diagnostic value due to patient's body habitus and equipment limitation. The right kidney measures 8.7 x 4 x 3.9 cm. Moderate lobulation of renal contour. No discrete mass or hydronephrosis. The renal cortex measures 0.8 cm. Left kidney measures 9.5 x 3.5 x 4.5 cm. No evidence of mass. No hydronephrosis. The renal cortex measures 1.2 cm. The urinary bladder is incompletely distended with moderate symmetrical thickening of the walls. No obvious intrinsic abnormality. A limited diagnostic study. No discrete renal mass or hydronephrosis. Signed by Dr Vandana Zamudio on 12/30/2020 7:42 AM    Xr Chest Portable    Result Date: 1/12/2021  Examination. XR CHEST PORTABLE 1/12/2021 8:24 AM History: Hypotension and vomiting. A single frontal portable upright view of the chest is compared with the previous study dated 1249 2020. There is left lower lobar consolidation and left basal pleural effusion which was not seen in the previous study. The lungs are poorly inflated. There is no pulmonary congestion or pneumothorax. There is moderate cardiomegaly. A dual-chamber cardiac pacer is seen in place. No acute bony abnormality. Left lower lobar consolidation and left basal pleural effusion.  Signed by Dr Vandana Zamudio on 1/12/2021 9:34 AM    Xr Chest Portable    Result Date: 12/29/2020 EXAMINATION: Chest one view 12/29/2020 HISTORY: Dyspnea FINDINGS: Upright frontal projection of the chest demonstrates a focal bulge in the right paratracheal contour in the superior mediastinum. There is no displacement of the trachea. This may simply be related to a tortuous great vessel but an aneurysm of a great vessel or paratracheal mass is not entirely excluded. Unfortunately, I do not have any prior chest radiographs available for comparison purposes. Follow-up with enhanced CT imaging of the chest could BE obtained to better characterize this finding. The mediastinal contours are otherwise unremarkable. There is atheromatous calcification tortuosity of the thoracic aorta. There is a granuloma within the left lung base. Transvenous pacer is well positioned. 1.. Focal bulging of the right paratracheal stripe in the superior mediastinum. Differential considerations are as above but do include paratracheal adenopathy or mass within the medial right apex. Follow-up with CT imaging of the chest with IV contrast could BE obtained to better characterize this finding. 2. Lungs are otherwise clear. Signed by Dr Junior Gong on 12/29/2020 5:06 PM    Egd    Result Date: 1/1/2021  No dictation     Colonoscopy    Result Date: 1/1/2021  No dictation       Assessment/Plan:     Principal Problem:    Enterocolitis  Active Problems:    Hypotension    Dark stools    Poor fluid intake    UTI (urinary tract infection)    Permanent atrial fibrillation (HCC)    ROSALES on CPAP    Chronic diastolic congestive heart failure (HCC)    Acute-on-chronic kidney injury (HCC)    Mild bibasilar atelectasis  Resolved Problems:    * No resolved hospital problems.  *       Hypotension, due to hypovolemia with GI losses  Enterocolitis, with loose dark stools  UTI  Possible aspiration pneumonia, with left lower lobe consolidation, with healthcare associated risk factors  IV fluid resuscitation Vancomycin, Cefepime, Flagyl given recent hospitalization/risk factors with coverage against enterocolitis, UTI, and possible pneumonia  Follow urine and blood cultures  GI consult given dark stools, possible bleed in setting of supratherapeutic INR on Xarelto, and was scheduled for outpatient GI evaluation  IV PPI twice daily  Follow CBC, transfuse for hemoglobin less than 7 or active bleed with hemodynamic instability    Mild troponin elevation  Cardiac monitoring, trend troponin, suspect related to demand ischemia secondary to hypotension/acute issues above    Permanent Afib on Xarelto, Supratherapeutic INR (INR >18)  Hold anticoagulation  Given vitamin K in the ED  Transfuse 2 units FFP in setting of possible GI bleed  Follow INR    Acute renal insufficiency on CKD, prerenal  Monitor response with IV fluid  Mims in place, monitor urine output    Hypokalemia, hypomagnesemia, hypophosphatemia, hypocalcemia  Monitor and replete electrolytes    Diabetes mellitus  Serial glucose monitoring with sliding scale insulin correction     HFpEF  --hold diuretic    ROSALES - CPAP QHS    Generalized weakness  PT/OT    Poor p.o. intake  Speech and dietary evaluation    N.p.o. until GI evaluation      Samantha Barrientos  1/12/2021 3:34 PM

## 2021-01-12 NOTE — ED NOTES
Family at bedside report that the pt has not been eating. Does not note any bloody vomit does note black stools.    Pt has been significantly weaker noramlly lives home alone and is able to care for self     Donal Kauffman RN  01/12/21 1794

## 2021-01-12 NOTE — ED NOTES
This nurse at bedside when Dr Swetha Jacome discussed code status.   Plan to remain full code until family is able to discuss together     Cece Castellanos, RN  01/12/21 8815

## 2021-01-12 NOTE — ED NOTES
Bed: 11  Expected date:   Expected time:   Means of arrival:   Comments:  Shayla Moulton RN  01/12/21 2073

## 2021-01-12 NOTE — PROGRESS NOTES
Pharmacy Renal Adjustment    Erick Sanchez is a 80 y.o. female. Pharmacy has renally adjusted Cefepime per protocol. Recent Labs     01/12/21  0904   BUN 14       Recent Labs     01/12/21  0904   CREATININE 1.6*       Estimated Creatinine Clearance: 18 mL/min (A) (based on SCr of 1.6 mg/dL (H)). Height:   Ht Readings from Last 1 Encounters:   01/12/21 5' 2\" (1.575 m)     Weight:  Wt Readings from Last 1 Encounters:   01/12/21 140 lb (63.5 kg)       Plan: Adjust Cefepime to 2 g IV q24h.     Electronically signed by Eyal Todd RPh, BCPS, 1/12/2021,1:26 PM

## 2021-01-12 NOTE — ED NOTES
Spoke with granddaughter and she states that currently the family does not want to tato a central line and pressers      Cayla Elizabeth RN  01/12/21 6551

## 2021-01-13 PROBLEM — A41.9 SEPSIS (HCC): Status: ACTIVE | Noted: 2021-01-01

## 2021-01-13 NOTE — ACP (ADVANCE CARE PLANNING)
Advance Care Planning     Advance Care Planning Activator (Inpatient)  Conversation Note      Date of ACP Conversation: 1/13/21    [facility] Activator: Sayda Spangler Decision Maker:     Current Designated Health Care Decision Maker:   Primary Decision Maker: Tori Reilly - Tha - [phone]    Secondary Decision Maker: Darwin [place] - [phone]    Supplemental (Other) Decision Maker: Julio Cesar Bustillo - [phone]       Care Preferences  Ventilation: \"If you were in your present state of health and suddenly became very ill and were unable to breathe on your own, what would your preference be about the use of a ventilator (breathing machine) if it were available to you? \"      Would the patient desire the use of ventilator (breathing machine)?: Yes      Resuscitation  \"In the event your heart stopped as a result of an underlying serious health condition, would you want attempts to be made to restart your heart (answer \"yes\" for attempt to resuscitate) or would you prefer a natural death (answer \"no\" for do not attempt to resuscitate)? \" Yes          Conversation Outcomes:  [x] ACP discussion completed  [] Existing advance directive reviewed with patient; no changes to patient's previously recorded wishes  [] New Advance Directive completed  [] Portable Do Not Rescitate prepared for Provider review and signature  [] POLST/POST/MOLST/MOST prepared for Provider review and signature      Electronically signed by Huey Moscoso RN on 1/13/2021 at 2:06 PM

## 2021-01-13 NOTE — PROGRESS NOTES
Physician Progress Note      Ina Mora  RICH #:                  [id number]  :                       5/3/1925  ADMIT DATE:       2021 8:55 AM  DISCH DATE:  RESPONDING  PROVIDER #:        Raman Bravo          QUERY TEXT:    Pt admitted with enterocolitis. Pt noted to have supratherapeutic INR >18.80. ER Provider notes coagulopathy. Patient takes Xarelto 15 mg daily for atrial   fibrillation. If possible, please document in progress notes and discharge   summary the relationship, if any, between coagulopathy and Xarelto.     The medical record reflects the following:  Risk Factors: Xarelto use for atrial fibrillation  Clinical Indicators: Supratherapeutic INR >18.80, Hematemesis, dark stool   positive for OB  Treatment: Vitamin K 10 mg IV, Protonix 80 mg IV, LR bolus 2L, GI consult,   serial CBC  Options provided:  -- Coagulopathy due to Xarelto  -- Coagulopathy unrelated to Xarelto  -- Other - I will add my own diagnosis  -- Disagree - Not applicable / Not valid  -- Disagree - Clinically unable to determine / Unknown  -- Refer to Clinical Documentation Reviewer    PROVIDER RESPONSE TEXT:    Coagulopathy, unspecified    Query created by: Miah Suarez on 2021 5:39 PM      Electronically signed by:  Raman Bravo 2021 7:54 AM

## 2021-01-13 NOTE — CONSULTS
GI Consult Note    Pt Name: Joesph Ruiz  MRN: [id number]  [id number]  YOB: 1925  Admit Date: 1/12/2021  8:55 AM  Date of evaluation: 1/13/2021  Primary Care Physician: Jacob Yen MD   [id number]       CC: Loose stools, dark stools, anemia, history of enterocolitis, hematemesis    HPI: 49-year-old female with past medical history of recent enterocolitis, atrial fibrillation, obstructive sleep apnea, hypothyroidism, hypertension, diabetes, and hyperlipidemia presented to the hospital with hematemesis and hypotension. On initial evaluation she was noted to have extremely high INR of greater than 18 and required 2 units FFP for reduction. In addition due to hypotension, she received multiple fluid boluses yesterday as well and she now has hemoglobin that is reduced from 10.6-9.0. She is had no witnessed episodes of hematemesis, coffee-ground emesis, melena, or hematochezia while in the hospital.  The patient did confirm as an outpatient she has had dark stools for at least a week. She denies fevers, chills, shortness of breath, dizziness, chest pain, and further hematemesis in hospital.  Patiently was recently at [facility] and underwent both an EGD with duodenal biopsies and colonoscopy with random biopsies. No available results in the medical chart for these biopsies at this time. GI was contacted for this consultation at 7 AM this morning.       Past Medical History:        Diagnosis Date    A-fib ([address]     CPAP (continuous positive airway pressure) dependence     9cm    DM (diabetes mellitus screen)     Hyperlipidemia     Hypertension     Hyperthyroidism     Left carotid bruit     Macular degeneration     Obstructive sleep apnea     AHI:  15.3     Past Surgical History:        Procedure Laterality Date    APPENDECTOMY      BLADDER SURGERY      CHOLECYSTECTOMY      COLONOSCOPY N/A 01/01/2021 Dr Dima Rodrigues sigmoid diverticulosis, possible rectal incontinence contributing to diarrhea-AP x 1, BCM x 1    PACEMAKER PLACEMENT      PAT AND BSO      UPPER GASTROINTESTINAL ENDOSCOPY N/A 01/01/2021    Dr Kern-Uncomplicated hiatal hernia, no active ulcer disease or esophagitis-Duodenitis     Social History:   Social History     Tobacco Use    Smoking status: Never Smoker    Smokeless tobacco: Never Used   Substance Use Topics    Alcohol use: No     Family History:   No family history on file. Home Meds:  Prior to Admission medications    Medication Sig Start Date End Date Taking?  Authorizing Provider   carvedilol (COREG) 25 MG tablet TAKE ONE TABLET BY MOUTH TWICE A DAY WITH MEALS 12/22/20  Yes Lai Sierra MD   levothyroxine (SYNTHROID) 75 MCG tablet TAKE ONE TABLET BY MOUTH EVERY DAY 12/22/20  Yes Lai Sierra MD   amLODIPine (NORVASC) 10 MG tablet TAKE ONE TABLET BY MOUTH EVERY DAY 12/22/20  Yes Lai Sierra MD   furosemide (LASIX) 40 MG tablet TAKE ONE TABLET BY MOUTH EVERY DAY AND EXTRA TABLET TWICE WEEKLY AS NEEDED 12/22/20  Yes Lai Sierra MD   colestipol (COLESTID) 1 g tablet Take 1 tablet by mouth 2 times daily 1/7/21   Lai Sierra MD   ondansetron (ZOFRAN) 4 MG tablet Take 1 tablet by mouth every 6-8 hours as needed for Nausea or Vomiting 1/5/21   Lai Sierra MD   dronedarone hcl (MULTAQ) 400 MG TABS TAKE ONE TABLET BY MOUTH TWICE A DAY WITH MORNING AND EVENING MEAL 12/22/20   Lai Sierra MD   pravastatin (PRAVACHOL) 40 MG tablet TAKE ONE TABLET BY MOUTH EVERY EVENING 12/22/20   Lai Sierra MD   olmesartan (BENICAR) 40 MG tablet TAKE ONE TABLET BY MOUTH EVERY DAY 12/22/20   Lai Sierra MD   rivaroxaban (XARELTO) 15 MG TABS tablet TAKE ONE TABLET BY MOUTH EVERY DAY  Patient taking differently: daily TAKE ONE TABLET BY MOUTH EVERY DAY 12/22/20   Lai Sierra MD ranolazine (RANEXA) 500 MG extended release tablet TAKE ONE TABLET BY MOUTH TWICE A DAY 12/22/20   Mai Silva MD   potassium chloride (KLOR-CON M) 20 MEQ extended release tablet TAKE ONE TABLET BY MOUTH ONCE DAILY 12/22/20   Mai Silva MD   blood glucose test strips (FREESTYLE LITE) strip USE ONE STRIP ONCE DAILY 12/22/20   Mai Silva MD   Cholecalciferol (VITAMIN D3) 50 MCG (2000 UT) CAPS Take by mouth    Historical Provider, MD      Allergies:  Patient has no known allergies.       Current Meds:      insulin lispro  0-6 Units Subcutaneous TID WC    insulin lispro  0-3 Units Subcutaneous Nightly    pantoprazole  40 mg Intravenous BID    And    sodium chloride (PF)  10 mL Intravenous BID    metroNIDAZOLE  500 mg Intravenous Q8H    cefepime  2 g Intravenous Q24H    levothyroxine  75 mcg Oral Daily    sodium chloride flush  10 mL Intravenous 2 times per day    [Held by provider] cholestyramine  1 packet Oral BID        lactated ringers 75 mL/hr at 01/13/21 0836    sodium chloride         PRN Meds:  sodium chloride, sodium chloride flush, promethazine **OR** ondansetron, acetaminophen **OR** acetaminophen, potassium chloride **OR** potassium alternative oral replacement **OR** potassium chloride, magnesium sulfate        ROS:  ROS NEGATIVE EXCEPT THOSE MARKED WITH AN \"X\"    GENERAL: [] Fevers, [] chills, [x] generalized weakness, [] weight loss, []weight gain, [] anorexia  Skin/Breast: [] jaundice, [] new rashes, [] itching   Eyes/Ears/Nose/Mouth/Throat: [] change in vision, [] double vision, [x] light headiness, [] vertigo  CARDIOVASCULAR: [] chest pain, [] palpitations, [] syncope, [] dyspnea on exertion, [] orthopnea  RESPIRATORY: [] SOB, [] cough, [] wheezing, [] hemoptysis Psychiatric: [x]  Orientated to person, place, and time; [x] mood and affect unremarkable, [x] memory recent and remote fair      Labs:     Recent Labs     01/12/21  0904 01/12/21  1551 01/13/21  0413   WBC 4.5* 5.3 3.5*   RBC 3.65* 3.31* 3.14*   HGB 10.6* 9.5* 9.0*   HCT 32.2* 29.0* 26.8*   MCV 88.2 87.6 85.4   MCH 29.0 28.7 28.7   MCHC 32.9* 32.8* 33.6    162 162     Recent Labs     01/12/21  0904 01/13/21  0413   * 134*   K 2.5* 2.4*   ANIONGAP 13 14    105   CO2 13* 15*   BUN 14 14   CREATININE 1.6* 1.5*   GLUCOSE 143* 77   CALCIUM 6.8* 7.6*     Recent Labs     01/12/21  0904 01/13/21  0413   MG 1.6* 2.0   PHOS 2.1* 2.8     Recent Labs     01/12/21  0904   AST 18   ALT 6   BILITOT 0.4   ALKPHOS 59     HgBA1c:  No components found for: HGBA1C  FLP:    Lab Results   Component Value Date    TRIG 99 12/30/2020    HDL 32 12/30/2020    LDLCALC 54 12/30/2020     TSH:    Lab Results   Component Value Date    TSH 1.220 12/10/2020     Troponin T:   Recent Labs     01/12/21  0904 01/12/21  1551 01/12/21  1940   TROPONINI 0.08* 0.06* 0.06*     INR:   Recent Labs     01/12/21  1003 01/13/21  0413   INR >18.80* 3.20*       Recent Labs     01/12/21  0904   LIPASE 9*       Radiology:  Ct Abdomen Pelvis Wo Contrast Additional Contrast? None    Result Date: 1/12/2021 CT ABDOMEN PELVIS WO CONTRAST 1/12/2021 10:12 AM HISTORY: Vomiting with abdominal pain, distention and hypotension COMPARISON: 12/29/2020 DLP: 1842 mGy cm. Automated exposure control was utilized to diminish patient radiation dose. TECHNIQUE: Noncontrast enhanced images of the abdomen and pelvis obtained without oral contrast. FINDINGS: There is a small amount of air projecting along the anterior margin of the cardiac silhouette suspected to represent a small amount of air entrapped within the right atrium-likely related to intravenous infusion. Coronary artery calcifications are present. A transvenous pacer is in place. A small left-sided and trace right-sided effusion are observed. Bibasilar atelectasis is present. A small hiatal hernia is present. [employer] LIVER: The 7 mm hypodensity previously described within the left lobe of the liver is stable. No additional hepatic lesions are demonstrated. [employer] BILIARY SYSTEM: The gallbladder has been surgically removed. There is moderate extrahepatic biliary dilatation unchanged from the previous exam.. PANCREAS: Multiple areas of cystic change are noted within the pancreas similar in appearance to the previous examination. May represent pancreatic ductal dilatation versus complications of previous pancreatitis or mucinous cystic neoplasm. Continued follow-up will be suggested. No radiographic evidence of acute pancreatitis. . SPLEEN: Unremarkable. KIDNEYS AND ADRENALS: The adrenals are unremarkable. There is a nonobstructing calculus involving the lower pole calyx of the right kidney. .  The ureters are decompressed and normal in appearance. RETROPERITONEUM: There is heavy atheromatous calcification of the abdominal aorta. No evidence of aneurysm. No evidence of retroperitoneal hematoma or adenopathy. [employer] GI TRACT: There is an abnormal bowel gas pattern with multiple fluid-filled loops of small bowel and colon with air-fluid levels. Findings would suggest an ongoing enterocolitis.  There is diverticulosis of the descending and sigmoid colon without evidence of acute diverticulitis. . The appendix is surgically absent. . OTHER: There is no mesenteric mass, lymphadenopathy or fluid collection. The osseous structures and soft tissues demonstrate no worrisome lesions. A chronic compression deformity is noted of the upper endplate of L4. PELVIS: The patient's undergone prior hysterectomy. There is no evidence of pelvic mass or adenopathy. . A Mims catheter is in place within the bladder with decompression. .    1. Abnormal bowel gas pattern with fluid-filled small bowel and colon with multiple air-fluid levels. Findings would suggest an ongoing enterocolitis. No evidence of mechanical obstruction or focal bowel wall thickening. 2. Diverticulosis of the descending and sigmoid colon without evidence of acute diverticulitis. 3. Trace right-sided and small left-sided pleural effusion. There is bibasilar atelectasis. 4. Nonobstructing calculus lower pole calyx right kidney. No evidence of obstructive uropathy. 5. The patient's undergone prior hysterectomy, cholecystectomy and appendectomy. Moderate extrahepatic biliary dilatation is likely related to reservoir effect and is stable from the previous exam. 6. Multiple areas of cystic change within the pancreatic gland. This may represent pancreatic ductal dilatation given the distribution. Complications of previous pancreatitis or mucinous cystic neoplasm should also be considered. These are stable from the previous exam. There is no evidence of pancreatitis. . Signed by Dr Daryle Redhead on 1/12/2021 11:39 AM    Ct Abdomen Pelvis Wo Contrast Additional Contrast? None    Result Date: 12/29/2020 CT abdomen and pelvis 12/29/2020 3:44 PM HISTORY: Diarrhea TECHNIQUE: Axial images of the abdomen and pelvis were obtained without IV contrast. Coronal and sagittal reformatted images are reconstructed and reviewed. COMPARISON: 12/23/2020. DLP: 1283 mGy cm Automated exposure control was utilized to minimize patient radiation dose. FINDINGS: A 7 mm hypodensity in the left hepatic lobe similar and favorable for an incidental cyst. Spleen remains similar in size and appearance. Accessory splenule seen inferior to the splenic hilum on image 21. There are scattered cystic foci the pancreas is which may be pancreatic cysts, intraductal papillary mucinous neoplasms, or cystic dilatation of pancreatic duct. Overall appearance is similar to the recent exam. No peripancreatic inflammation. Cholecystectomy clips. No suspicious adrenal nodule. No abnormal perinephric fluid collection. No hydronephrosis. Latter underdistended. Uterus absent. There is fluid contained throughout the small and large bowel. There is no radiographic evidence of bowel obstruction. No free air or abscess. Stomach is under distended. Moderate size hiatal hernia. Few sigmoid diverticuli with no adjacent inflammation. There is2 diffuse vascular calcification with no aneurysm. Cardiac pacer device. Myocardium likely. Trace pericardial fluid. No pleural effusions. Visible lung bases are unremarkable. Osteopenia and degenerative change of the regional skeleton. Chronic compression of superior plate of L4. Levoscoliotic curvature of the lumbar spine. 1. There is fluid contained throughout the nondilated small and large bowel loops which may be seen with an infectious/inflammatory enterocolitis. No prominent abnormal bowel wall thickening. No evidence of bowel obstruction. No free air or abscess. 2. Multiple scattered cystic lesions of the pancreas may represent cysts, intraductal papillary mucinous neoplasms, or cystic distention pancreatic duct. Finding is not acute and there is no peripancreatic inflammation. Comparison outside films would be helpful to assess chronicity of the finding. If no outside studies, follow-up nonemergent outpatient CT or MR imaging with and without IV contrast utilizing pancreatic protocol would be best for further evaluation. If Patient unable to tolerate IV contrast, short-term follow-up CT exam in 3 months recommended to reassess. 3. Probable subcentimeter lateral left hepatic cyst. 4. Prior cholecystectomy, hysterectomy, and appendectomy. 3. Cardiac pacer device. Mild cardiomegaly with trace pericardial fluid. 5. Diffuse vascular calcification with no regional aneurysm.  Signed by Dr Emile Prince on 12/29/2020 5:04 PM    Ct Abdomen Pelvis Wo Contrast Additional Contrast? None    Result Date: 12/23/2020 Examination. CT ABDOMEN PELVIS WO CONTRAST 12/23/2020 1:50 PM History: History of diarrhea. DLP: 1057 mGycm. The CT scan of the abdomen and pelvis is performed without intravenous contrast enhancement. The images are acquired in axial plane with subsequent reconstruction in coronal and sagittal plane. There is no previous study for comparison. The lung bases included in the study show scattered areas of scarring and a few calcified granulomas. The limited included cardiomediastinal structures show atheromatous changes of the coronary arteries. There is a moderate size hiatal hernia. Unenhanced liver and spleen appear unremarkable. The gallbladder is surgically absent. There is no significant dilatation of common bile duct. There is a low-density area located in the head of the pancreas adjacent and lateral to the distal superior mesenteric vein measuring 1.5 cm in diameter. The CT density suggest a cyst. The remaining pancreas with diffuse nodular. The possibility of ductal dilatation is not excluded. There are degenerative glands bilaterally are normal. Moderate lobulation of renal contour bilaterally seen. There is a 3 mm radiopaque calculus in the lower pole of the right kidney. No calculi in the left kidney. No hydronephrosis on either side. The limited visualized ureters are unremarkable. The urinary bladder is moderately well distended. There is a tiny air bubble adjacent to the anterior wall of the urinary bladder which may be due to instrumentation or infection/inflammation. The uterus is surgically absent. No adnexal masses. The stomach is unremarkable. The duodenum is normal. Nondistended fluid-filled small bowel loops are seen. Appendix is surgically absent. Fluid-filled moderately dilated large bowel loops are seen. There are air-fluid levels. Fluid/liquid fecal material is seen in the sigmoid colon and rectum. No finding to suggest obstruction. There is diverticulosis of the distal colon.  No finding to suggest diverticulitis. Severe atheromatous changes of the abdominal aorta and iliac arteries are seen. There are large atheromatous plaques at the origin of the celiac and superior mesenteric arteries. There is no evidence of abdominal or pelvic lymphadenopathy. There are borderline prominent mesenteric nodes. The images reviewed in bone window show chronic degenerative changes of the lumbar spine. There is a moderate levoscoliosis. The superior endplate compression of vertebra L4 which appears chronic. Moderate diffuse demineralization the bony structures. Nondistended fluid-filled small bowel loops and moderately dilated fluid filled large bowel loops may represent an acute enterocolitis, ileus or diarrhea status. No obstruction. The diverticulosis of the distal colon. No evidence for diverticulitis. A low-density nodule in the head of the pancreas which may represent a cyst, pseudocyst or a cystic neoplasm. Further evaluation with contrast enhanced CT scan of the abdomen with pancreatic protocol may be obtained. A 3 mm nonobstructing calculus lower pole of the right kidney.  Signed by Dr Dany Saravia on 12/23/2020 3:08 PM    Ct Chest Wo Contrast    Result Date: 12/30/2020 EXAMINATION: CT CHEST WO CONTRAST 12/30/2020 8:35 AM HISTORY: Paratracheal mass Comparison: Chest x-ray 12/29/2020 Technique: Sequential imaging was performed from the thoracic inlet through the upper abdomen without the use of IV contrast. Sagittal and coronal reformations were made from the original source data and reviewed. Automated exposure control was utilized for radiation dose reduction. Radiation dose:  mGy-cm Findings: a low-density lesion is seen in the right thyroid lobe measuring 7 mm in size. Thyroid gland is otherwise unremarkable. The trachea and main bronchi appear widely patent and in normal anatomic position. The esophagus is grossly normal in appearance. There is a small hiatal hernia. A left subclavian approach dual lead cardiac pacing device is in place with tips adjacent to the right atrium and right ventricle. No pathologically enlarged axillary, mediastinal, or hilar lymph nodes are identified when allowing for limitations of a noncontrast exam. Heart appears normal in size. There is trace pericardial fluid. Coronary artery calcifications are present. Atherosclerotic calcifications are seen in the aorta and its branch vessels. The ascending thoracic aorta and main pulmonary artery appear normal in caliber. Prominence of the right paratracheal region appears to be related to the underlying SVC. There are dependent changes in the lung bases. There is some nodularity in the left lung base adjacent to the hemidiaphragm, presumably atelectasis. There is a calcified granuloma in the left lower lobe. There is a small noncalcified nodule in the right lower lobe adjacent to the hemidiaphragm measuring 4 mm (axial image 94). There is a groundglass nodule measuring 6 mm in the peripheral right lower lobe (axial image 81). An additional 4 mm groundglass nodule is seen in the superior right lower lobe (axial image 62).  Several additional small noncalcified groundglass nodules are seen in the peripheral right lower lobe (axial images 83-90) additional tiny groundglass nodules are seen throughout the right upper lobe. Review of the visualized portion of the upper abdomen demonstrates nonobstructive right renal stone. There is a nodule in the upper abdomen adjacent to the hemidiaphragm measuring 7 mm in size. There is intra and extrahepatic biliary ductal dilatation status post cholecystectomy. Multiple cystic lesions are noted throughout the pancreas which are not well evaluated by CT. There is a small right adrenal nodule. Review of the visualized osseous structures demonstrates no acute or aggressive lesions. Old right-sided rib fractures are present. Patient appears osteopenic. There are degenerative changes in the spine. Impression: 1. No evidence of a right paratracheal mass. 2. Multiple groundglass nodules bilaterally suggest an inflammatory process. 3. Additional noncalcified pulmonary nodules are present. If patient is considered high risk, follow-up CT should be considered in 12 months per Fleischner Society guidelines. If the patient is considered low risk, no follow-up is recommended. 4. Atherosclerosis of the aorta and coronary arteries. 5. Small hiatal hernia. 6. Multiple pancreatic lesions are incompletely evaluated. Consider follow-up nonemergent MRI abdomen with and without contrast with MRCP.  Signed by Dr Maryana Bullock on 12/30/2020 8:51 AM    Us Renal Complete    Result Date: 12/30/2020 Examination. US RENAL COMPLETE 12/30/2020 6:12 AM History: Acute renal injury. The ultrasound examination of the kidneys bilaterally is performed. There is no previous study for comparison. The study is of limited diagnostic value due to patient's body habitus and equipment limitation. The right kidney measures 8.7 x 4 x 3.9 cm. Moderate lobulation of renal contour. No discrete mass or hydronephrosis. The renal cortex measures 0.8 cm. Left kidney measures 9.5 x 3.5 x 4.5 cm. No evidence of mass. No hydronephrosis. The renal cortex measures 1.2 cm. The urinary bladder is incompletely distended with moderate symmetrical thickening of the walls. No obvious intrinsic abnormality. A limited diagnostic study. No discrete renal mass or hydronephrosis. Signed by Dr Marcelino Conrad on 12/30/2020 7:42 AM    Xr Chest Portable    Result Date: 1/12/2021  Examination. XR CHEST PORTABLE 1/12/2021 8:24 AM History: Hypotension and vomiting. A single frontal portable upright view of the chest is compared with the previous study dated 1249 2020. There is left lower lobar consolidation and left basal pleural effusion which was not seen in the previous study. The lungs are poorly inflated. There is no pulmonary congestion or pneumothorax. There is moderate cardiomegaly. A dual-chamber cardiac pacer is seen in place. No acute bony abnormality. Left lower lobar consolidation and left basal pleural effusion.  Signed by Dr Marcelino Conrad on 1/12/2021 9:34 AM    Xr Chest Portable    Result Date: 12/29/2020 On initial presentation she had an INR greater than 18 while on Xarelto and this certainly could result in dark stools. There were no ulcerations or sites of bleeding on previous endoscopy there would be of significant concern. She is currently hemodynamically stable with no evidence of active GI bleeding. After 2 units FFP, her INR is reduced down to 3.2 and will allow to slowly reduce.   At this time due to the patient's advanced age and now an event in which she had an INR that certainly could induce severe bleeding, there must be the discussion with the patient/patient family as to whether or not they want to continue with anticoagulation  Trend H&H  Continue Protonix 40 mg IV twice daily  Treat UTI as indicated    Kamila Fermin DO

## 2021-01-13 NOTE — PROGRESS NOTES
Daily Progress Note    Date:2021  Patient: Donald Davis  : 5/3/1925  VSR:[id number]  CODE:Full Code No additional code details  Margo Mtz MD    Admit Date: 2021  8:55 AM   LOS: 1 day       Subjective:    Hemodynamically improved with IV fluids and stable for transfer back to the floor this a.m.   she is resting comfortably with no new complaints. No diarrhea or loose stools noted overnight. No bleeding episodes.     Review of Systems    Comprehensive ROS completed and is negative except as otherwise noted    Objective:      Vital signs in last 24 hours:  Patient Vitals for the past 24 hrs:   BP Temp Temp src Pulse Resp SpO2 Height Weight   21 0600 (!) 113/52   60 15 94 %  152 lb (68.9 kg)   21 0510 (!) 106/47   60 14 95 %     21 0500    60 14 96 %     21 0430 (!) 111/44 97.1 °F (36.2 °C) Temporal 60 13 95 %     21 0400 (!) 109/45   60 20 95 %     21 0300 (!) 110/44   60 12 96 %     21 0100 (!) 104/42   60 12 95 %     21 0020    60       21 0000 (!) 102/40 97 °F (36.1 °C) Temporal 60 11 96 %     21 2300 (!) 99/42   60 12 96 %     21 2200 (!) 97/58   60 12 96 %     21 2100 (!) 95/44   60 15 97 %     21 2056     14 97 %     21 2055     13      21 2000 (!) 96/39 96.8 °F (36 °C) Temporal 60 12 96 %     21 1900 (!) 95/44   60 14 96 %     21 1800 (!) 90/42 97.3 °F (36.3 °C)  60 14 97 %     21 1730 (!) 85/42   60 12 98 %     21 1700 (!) 87/42   60 13 98 %     21 1645 (!) 83/35   60 12 98 %     21 1630 (!) 91/46   63 18 98 %     21 1615 (!) 88/40   60 12 98 %     21 1600 (!) 84/37 96.3 °F (35.7 °C) CORE 60 12 97 %     21 1545 (!) 88/46   64 20 96 %     21 1543 (!) 90/40 96.3 °F (35.7 °C)  60 13      21 1530 (!) 90/40   73 20 99 %   01/12/21 1500 (!) 84/49   60 12 98 %     01/12/21 1454    56 15 98 %     01/12/21 1443 (!) 82/40   60 14 98 %     01/12/21 1441 (!) 83/44   60 17 98 %     01/12/21 1428 (!) 80/38 96.4 °F (35.8 °C)   16      01/12/21 1422 (!) 83/44   60 13 98 %     01/12/21 1402 (!) 80/38   60 14 98 %     01/12/21 1343 (!) 81/44   60 11 98 %     01/12/21 1336    60 11 98 %     01/12/21 1323 (!) 77/41   59 10 98 %     01/12/21 1303 (!) 73/37   56 11 98 %     01/12/21 1301 (!) 73/37 97 °F (36.1 °C)  59 12 97 %     01/12/21 1255    60 13 99 %     01/12/21 1242 (!) 71/38   60 13 98 %     01/12/21 1241 (!) 71/38 96.1 °F (35.6 °C)  60 20      01/12/21 1222 (!) 74/36   59 10 98 %     01/12/21 1218 (!) 83/55   60 10 98 %     01/12/21 1121 (!) 75/64   60 9 99 %     01/12/21 1042 (!) 82/40   60 10 98 %     01/12/21 1022 (!) 81/38   60 9 99 %     01/12/21 1015 (!) 80/38   60 10 98 %     01/12/21 1006       5' 2\" (1.575 m)    01/12/21 1005        140 lb (63.5 kg)   01/12/21 1002 (!) 84/39   60 10 98 %     01/12/21 0953 (!) 86/39   60 11 98 %     01/12/21 0945 90/70     98 %     01/12/21 0937  98.2 °F (36.8 °C)         01/12/21 0923 (!) 74/35   64 13 100 %     01/12/21 0846 (!) 91/38   67 14 94 %       Physical exam    General: No acute distress  PSYCH: Normal mood  HEENT: Normocephalic/atraumatic  NECK: Trachea is midline. LUNGS: Normal respiratory effort, no intercostal retractions or accessory muscle use. CARDIOVASCULAR: Irregularly irregular rhythm, pulses are equal bilaterally. GI/ABDOMEN: Soft, non-tender, non-distended, no guarding or rebound. Bowel sounds are normal.   SKIN: Warm and dry.    NEUROLOGICAL: No focal deficits  EXTREMITIES: No edema         Lab Review   Recent Results (from the past 24 hour(s))   TYPE AND SCREEN    Collection Time: 01/12/21  9:00 AM   Result Value Ref Range    ABO/Rh A POS Antibody Screen NEG    PREPARE FRESH FROZEN PLASMA, 1 Units    Collection Time: 01/12/21  9:00 AM   Result Value Ref Range    Product Code Blood Bank [id number]     Description Blood Bank Plasma, Apheresis, 5 Day, Thawed     Unit Number [id number]     Dispense Status Blood Bank transfused    PREPARE FRESH FROZEN PLASMA, 2 Units    Collection Time: 01/12/21  9:00 AM   Result Value Ref Range    Product Code Blood Bank [id number]     Description Blood Bank Plasma, 5 Day, Thawed     Unit Number [id number]     Dispense Status Blood Bank transfused    CBC Auto Differential    Collection Time: 01/12/21  9:04 AM   Result Value Ref Range    WBC 4.5 (L) 4.8 - 10.8 K/uL    RBC 3.65 (L) 4.20 - 5.40 M/uL    Hemoglobin 10.6 (L) 12.0 - 16.0 g/dL    Hematocrit 32.2 (L) 37.0 - 47.0 %    MCV 88.2 81.0 - 99.0 fL    MCH 29.0 27.0 - 31.0 pg    MCHC 32.9 (L) 33.0 - 37.0 g/dL    RDW 17.3 (H) 11.5 - 14.5 %    Platelets 178 243 - 030 K/uL    MPV 9.4 9.4 - 12.3 fL    PLATELET SLIDE REVIEW Adequate     Neutrophils % 55.0 50.0 - 65.0 %    Lymphocytes % 13.0 (L) 20.0 - 40.0 %    Monocytes % 6.0 0.0 - 10.0 %    Eosinophils % 0.0 0.0 - 5.0 %    Basophils % 0.0 0.0 - 1.0 %    Neutrophils Absolute 3.6 1.5 - 7.5 K/uL    Immature Granulocytes # 0.0 K/uL    Lymphocytes Absolute 0.6 (L) 1.1 - 4.5 K/uL    Monocytes Absolute 0.30 0.00 - 0.90 K/uL    Eosinophils Absolute 0.00 0.00 - 0.60 K/uL    Basophils Absolute 0.00 0.00 - 0.20 K/uL    Bands Relative 26 (H) 0 - 5 %    Anisocytosis 1+ (A)     Polychromasia 1+ (A)     Poikilocytes 2+ (A)     [place] Cells 1+ (A)     Ovalocytes 1+ (A)    Comprehensive Metabolic Panel    Collection Time: 01/12/21  9:04 AM   Result Value Ref Range    Sodium 133 (L) 136 - 145 mmol/L    Potassium 2.5 (LL) 3.5 - 5.0 mmol/L    Chloride 107 98 - 111 mmol/L    CO2 13 (L) 22 - 29 mmol/L    Anion Gap 13 7 - 19 mmol/L    Glucose 143 (H) 74 - 109 mg/dL    BUN 14 8 - 23 mg/dL    CREATININE 1.6 (H) 0.5 - 0.9 mg/dL GFR Non- 30 (A) >60    GFR  36 (L) >59    Calcium 6.8 (LL) 8.2 - 9.6 mg/dL    Total Protein 4.8 (L) 6.6 - 8.7 g/dL    Alb 2.3 (L) 3.5 - 5.2 g/dL    Total Bilirubin 0.4 0.2 - 1.2 mg/dL    Alkaline Phosphatase 59 35 - 104 U/L    ALT 6 5 - 33 U/L    AST 18 5 - 32 U/L   Lipase    Collection Time: 01/12/21  9:04 AM   Result Value Ref Range    Lipase 9 (L) 13 - 60 U/L   Troponin    Collection Time: 01/12/21  9:04 AM   Result Value Ref Range    Troponin 0.08 (H) 0.00 - 0.03 ng/mL   Magnesium    Collection Time: 01/12/21  9:04 AM   Result Value Ref Range    Magnesium 1.6 (L) 1.7 - 2.3 mg/dL   Brain Natriuretic Peptide    Collection Time: 01/12/21  9:04 AM   Result Value Ref Range    Pro-BNP 1,866 (H) 0 - 1,800 pg/mL   Phosphorus    Collection Time: 01/12/21  9:04 AM   Result Value Ref Range    Phosphorus 2.1 (L) 2.5 - 4.5 mg/dL   TSH WITH REFLEX TO FT4    Collection Time: 01/12/21  9:04 AM   Result Value Ref Range    TSH Reflex FT4 3.86 0.35 - 5.50 uIU/mL   Lactic Acid, Plasma    Collection Time: 01/12/21  9:13 AM   Result Value Ref Range    Lactic Acid 1.1 0.5 - 1.9 mmol/L   EKG 12 Lead    Collection Time: 01/12/21  9:37 AM   Result Value Ref Range    P-R Interval  ms    QRS Duration 118 ms    Q-T Interval 446 ms    QTc Calculation (Bazett) 446 ms    P Axis  degrees    T Axis -177 degrees   Urinalysis Reflex to Culture    Collection Time: 01/12/21  9:38 AM    Specimen: Urine, straight catheter   Result Value Ref Range    Color, UA YELLOW Straw/Yellow    Clarity, UA Clear Clear    Glucose, Ur Negative Negative mg/dL    Bilirubin Urine Negative Negative    Ketones, Urine 15 (A) Negative mg/dL    Specific Gravity, UA 1.010 1.005 - 1.030    Blood, Urine MODERATE (A) Negative    pH, UA 5.5 5.0 - 8.0    Protein, UA 30 (A) Negative mg/dL    Urobilinogen, Urine 0.2 <2.0 E.U./dL    Nitrite, Urine POSITIVE (A) Negative    Leukocyte Esterase, Urine TRACE (A) Negative   Microscopic Urinalysis Collection Time: 01/12/21  9:38 AM   Result Value Ref Range    Hyaline Casts, UA 21-30 (A) 0 - 5 /LPF    Coarse Casts, UA 2-4 0 - 5 /LPF    WBC, UA 10-15 0 - 5 /HPF    RBC, UA 3-5 (A) 0 - 2 /HPF    Epithelial Cells, UA 3-5 /HPF    Bacteria, UA 4+ (A) None Seen /HPF   Culture, Urine    Collection Time: 01/12/21  9:38 AM    Specimen: Urine, clean catch   Result Value Ref Range    Urine Culture, Routine >100,000 CFU/ml  Mixed skin nicolas present      Protime-INR    Collection Time: 01/12/21 10:03 AM   Result Value Ref Range    Protime >120.0 (HH) 12.0 - 14.6 sec    INR >18.80 (HH) 0.88 - 1.18   APTT    Collection Time: 01/12/21 10:03 AM   Result Value Ref Range    aPTT 49.7 (H) 26.0 - 36.2 sec   Calcium, Ionized    Collection Time: 01/12/21 10:26 AM   Result Value Ref Range    Calcium, Ion 0.98 (L) 1.12 - 1.32 mmol/L   COVID-19    Collection Time: 01/12/21 11:54 AM   Result Value Ref Range    SARS-CoV-2, NAAT Not Detected Not Detected   CBC    Collection Time: 01/12/21  3:51 PM   Result Value Ref Range    WBC 5.3 4.8 - 10.8 K/uL    RBC 3.31 (L) 4.20 - 5.40 M/uL    Hemoglobin 9.5 (L) 12.0 - 16.0 g/dL    Hematocrit 29.0 (L) 37.0 - 47.0 %    MCV 87.6 81.0 - 99.0 fL    MCH 28.7 27.0 - 31.0 pg    MCHC 32.8 (L) 33.0 - 37.0 g/dL    RDW 17.1 (H) 11.5 - 14.5 %    Platelets 058 063 - 886 K/uL    MPV 9.9 9.4 - 12.3 fL   Troponin    Collection Time: 01/12/21  3:51 PM   Result Value Ref Range    Troponin 0.06 (H) 0.00 - 0.03 ng/mL   Troponin    Collection Time: 01/12/21  7:40 PM   Result Value Ref Range    Troponin 0.06 (H) 0.00 - 0.03 ng/mL   POCT Glucose    Collection Time: 01/12/21  8:15 PM   Result Value Ref Range    POC Glucose 152 (H) 70 - 99 mg/dl    Performed on [employer]    VANCOMYCIN, RANDOM    Collection Time: 01/13/21  4:13 AM   Result Value Ref Range    Vancomycin Rm 9.4 ug/mL   Protime-INR    Collection Time: 01/13/21  4:13 AM   Result Value Ref Range    Protime 33.7 (H) 12.0 - 14.6 sec    INR 3.20 (H) 0.88 - 1.18 CBC    Collection Time: 01/13/21  4:13 AM   Result Value Ref Range    WBC 3.5 (L) 4.8 - 10.8 K/uL    RBC 3.14 (L) 4.20 - 5.40 M/uL    Hemoglobin 9.0 (L) 12.0 - 16.0 g/dL    Hematocrit 26.8 (L) 37.0 - 47.0 %    MCV 85.4 81.0 - 99.0 fL    MCH 28.7 27.0 - 31.0 pg    MCHC 33.6 33.0 - 37.0 g/dL    RDW 16.7 (H) 11.5 - 14.5 %    Platelets 055 763 - 812 K/uL    MPV 9.3 (L) 9.4 - 12.3 fL   Basic Metabolic Panel    Collection Time: 01/13/21  4:13 AM   Result Value Ref Range    Sodium 134 (L) 136 - 145 mmol/L    Potassium 2.4 (LL) 3.5 - 5.0 mmol/L    Chloride 105 98 - 111 mmol/L    CO2 15 (L) 22 - 29 mmol/L    Anion Gap 14 7 - 19 mmol/L    Glucose 77 74 - 109 mg/dL    BUN 14 8 - 23 mg/dL    CREATININE 1.5 (H) 0.5 - 0.9 mg/dL    GFR Non- 32 (A) >60    GFR  39 (L) >59    Calcium 7.6 (L) 8.2 - 9.6 mg/dL   Magnesium    Collection Time: 01/13/21  4:13 AM   Result Value Ref Range    Magnesium 2.0 1.7 - 2.3 mg/dL   Albumin    Collection Time: 01/13/21  4:13 AM   Result Value Ref Range    Alb 2.2 (L) 3.5 - 5.2 g/dL   PROCALCITONIN    Collection Time: 01/13/21  4:13 AM   Result Value Ref Range    Procalcitonin 0.67 (H) 0.00 - 0.09 ng/mL   Phosphorus    Collection Time: 01/13/21  4:13 AM   Result Value Ref Range    Phosphorus 2.8 2.5 - 4.5 mg/dL   POCT Glucose    Collection Time: 01/13/21  7:36 AM   Result Value Ref Range    POC Glucose 132 (H) 70 - 99 mg/dl    Performed on [employer]        I/O last 3 completed shifts: In: 3720.4 [P.O.:50; I.V.:1463.8;  Blood:456.7; IV Piggyback:1750]  Out: 400 [Urine:400]  I/O this shift:  In: 10 [I.V.:10]  Out: -       Current Facility-Administered Medications:     insulin lispro (HUMALOG) injection vial 0-6 Units, 0-6 Units, Subcutaneous, TID WC, Krysten Duvall MD    insulin lispro (HUMALOG) injection vial 0-3 Units, 0-3 Units, Subcutaneous, Nightly, Krysten Duvall MD   lactated ringers infusion, , Intravenous, Continuous, Juan Jackson MD, Last Rate: 75 mL/hr at 01/12/21 1552, Rate Change at 01/12/21 1552    0.9 % sodium chloride infusion, , Intravenous, PRN, Shira Newby MD    pantoprazole (PROTONIX) injection 40 mg, 40 mg, Intravenous, BID, 40 mg at 01/13/21 0731 **AND** sodium chloride (PF) 0.9 % injection 10 mL, 10 mL, Intravenous, BID, Juan Jackson MD, 10 mL at 01/13/21 0732    metronidazole (FLAGYL) 500 mg in NaCl 100 mL IVPB premix, 500 mg, Intravenous, Q8H, Juan Jackson MD, Stopped at 01/13/21 0607    ceFEPIme (MAXIPIME) 2 g in sterile water 20 mL IV syringe, 2 g, Intravenous, Q24H, Juan Jackson MD    levothyroxine (SYNTHROID) tablet 75 mcg, 75 mcg, Oral, Daily, Juan Jackson MD, Stopped at 01/13/21 0720    sodium chloride flush 0.9 % injection 10 mL, 10 mL, Intravenous, 2 times per day, Juan Jackson MD, 10 mL at 01/13/21 0732    sodium chloride flush 0.9 % injection 10 mL, 10 mL, Intravenous, PRN, Juan Jackson MD    promethazine (PHENERGAN) tablet 12.5 mg, 12.5 mg, Oral, Q6H PRN **OR** ondansetron (ZOFRAN) injection 4 mg, 4 mg, Intravenous, Q6H PRN, Juan Jackson MD    acetaminophen (TYLENOL) tablet 650 mg, 650 mg, Oral, Q6H PRN **OR** acetaminophen (TYLENOL) suppository 650 mg, 650 mg, Rectal, Q6H PRN, Juan Jackson MD    potassium chloride (KLOR-CON M) extended release tablet 40 mEq, 40 mEq, Oral, PRN **OR** potassium bicarb-citric acid (EFFER-K) effervescent tablet 40 mEq, 40 mEq, Oral, PRN **OR** potassium chloride 10 mEq/100 mL IVPB (Peripheral Line), 10 mEq, Intravenous, PRN, Juan Jackson MD, Last Rate: 100 mL/hr at 01/13/21 0731, 10 mEq at 01/13/21 0731    magnesium sulfate 2 g in 50 mL IVPB premix, 2 g, Intravenous, PRN, Juan Jackson MD, Stopped at 01/13/21 0717    [Held by provider] cholestyramine Pérez Camera) packet 4 g, 1 packet, Oral, BID, Juan Jackson MD      Assessment/Plan:     Principal Problem: Enterocolitis  Active Problems:    Hypotension    Dark stools    Poor fluid intake    UTI (urinary tract infection)    Permanent atrial fibrillation (HCC)    ROSALES on CPAP    Chronic diastolic congestive heart failure (HCC)    Acute-on-chronic kidney injury (HCC)    Mild bibasilar atelectasis  Resolved Problems:    * No resolved hospital problems. *        Hypotension, due to hypovolemia with GI losses - improving  Enterocolitis, with loose dark stools  E.  Coli UTI  Possible aspiration pneumonitis vs atelectasis  --IV hydration while NPO  d/c Vancomycin  --continue Cefepime and Flagyl   UCx + Ecoli - f/u susceptibilities, BCx NGTD  GI on board, appreciate recs  IV PPI twice daily  Follow CBC, transfuse for hemoglobin less than 7      Permanent Afib on Xarelto, Supratherapeutic INR (INR >18)  Hold anticoagulation  given vitamin K and 2 units FFP 1/12/21  INR down to 3.2, monitor daily     Acute renal insufficiency on CKD, prerenal  Monitor response with IV fluid  Mims in place, monitor urine output     Hypokalemia, hypomagnesemia, hypophosphatemia, hypocalcemia  Monitor and replete electrolytes     Diabetes mellitus  Serial glucose monitoring with sliding scale insulin correction      HFpEF  --hold diuretic      ROSALES - CPAP QHS     Generalized weakness  PT/OT     Poor p.o. intake  Speech and dietary evaluation          Ghada Zapata MD 1/13/2021 8:06 AM

## 2021-01-13 NOTE — CONSULTS
Comprehensive Nutrition Assessment    Type and Reason for Visit:  Initial, Consult, Positive Nutrition Screen    Nutrition Recommendations/Plan: Await diet advancement. Nutrition Assessment:  Consult received for poor intake/appetite x5 days. Pt presents at risk for nutritional decline r/t NPO status and reports of poor recent intake. Wt loss 13 lbs (7.8%) documented over the past 6 months. Will await diet advancement and implement intervention as able. Malnutrition Assessment:  Malnutrition Status: At risk for malnutrition (Comment)    Context:  Acute Illness       Estimated Daily Nutrient Needs:  Energy (kcal):  0705-1777; Weight Used for Energy Requirements:  Current     Protein (g):  ; Weight Used for Protein Requirements:  Ideal(1.3-2.0)        Fluid (ml/day):  1990-1855; Method Used for Fluid Requirements:  1 ml/kcal      Wounds:  None       Current Nutrition Therapies:    Diet NPO, After Midnight Exceptions are: Ice Chips, Sips with Meds    Anthropometric Measures:  · Height: 5' 2\" (157.5 cm)  · Current Body Weight: 152 lb (68.9 kg)   · Usual Body Weight: 165 lb (74.8 kg)(7/2020)     · Ideal Body Weight: 110 lbs; % Ideal Body Weight 138.2 %   · BMI: 27.8  · BMI Categories: Overweight (BMI 25.0-29. 9)       Nutrition Diagnosis:   · Inadequate energy intake related to acute injury/trauma as evidenced by NPO or clear liquid status due to medical condition, poor intake prior to admission      Nutrition Interventions:   Food and/or Nutrient Delivery:  Continue NPO  Nutrition Education/Counseling:  No recommendation at this time   Coordination of Nutrition Care:  Continue to monitor while inpatient    Goals:  Progress to an oral diet with PO >50%, wt stable       Nutrition Monitoring and Evaluation:   Behavioral-Environmental Outcomes:  None Identified   Food/Nutrient Intake Outcomes:  Diet Advancement/Tolerance, Food and Nutrient Intake Physical Signs/Symptoms Outcomes:  Biochemical Data, Skin, Weight     Discharge Planning:     Too soon to determine     Electronically signed by Roxy Wilson RD, LD on 1/13/21 at 3:02 PM CST    Contact: [phone]

## 2021-01-13 NOTE — PROGRESS NOTES
Physical Therapy  Name: Tracee Rodgers  MRN:  [id number]  Date of service:  1/13/2021 01/13/21 1437   Subjective   Subjective Attempt: Pt BTB with nursing assist, resting at this time.          Electronically signed by Wandy Leija PTA on 1/13/2021 at 2:39 PM

## 2021-01-13 NOTE — PROGRESS NOTES
Ramu Coombs transferred to 531 from [facility] via wheelchair. Reason for transfer: lower level of care   Explained reason for transfer to Patient and Family. Belongings: Glasses, Hearing Aides bilateral with patient at bedside . Soft chart transferred with patient: Yes. Telemetry box number [id number] transferred with patient: yes. Report given to: Nelli CLEMENT, at bedside.       Electronically signed by Arvella Bernheim, RN on 1/13/2021 at 9:55 AM

## 2021-01-13 NOTE — PROGRESS NOTES
Speech Language Pathology  Facility/Department: [facility]    NAME: Coy Yoon  : 5/3/1925  MRN: [id number]     Received order to assess. However, patient currently NPO.  Will attempt evaluation when cleared for PO intake by MD.    Electronically signed by GINETTE Rose on 2021 at 11:33 AM

## 2021-01-13 NOTE — PROGRESS NOTES
Pharmacy Vancomycin Consult     Vancomycin Day: 2  Current Dosing: Pulse dosing    Temp max:  98.2    Recent Labs     01/12/21  0904 01/13/21  0413   BUN 14 14       Recent Labs     01/12/21  0904 01/13/21  0413   CREATININE 1.6* 1.5*       Recent Labs     01/12/21  1551 01/13/21  0413   WBC 5.3 3.5*         Intake/Output Summary (Last 24 hours) at 1/13/2021 0544  Last data filed at 1/13/2021 0500  Gross per 24 hour   Intake 3520.42 ml   Output 400 ml   Net 3120.42 ml     No current cultures at this time  Culture Date Source Results                      Ht Readings from Last 1 Encounters:   01/12/21 5' 2\" (1.575 m)        Wt Readings from Last 1 Encounters:   01/12/21 140 lb (63.5 kg)         Body mass index is 25.61 kg/m². Estimated Creatinine Clearance: 20 mL/min (A) (based on SCr of 1.5 mg/dL (H)).     Trough: 9.4    Assessment/Plan: Give Vancomycin 1gm x1 dose today with random level in AM    Electronically signed by Neena Ponce, [address] [place] [address] on 1/13/2021 at 5:44 AM

## 2021-01-13 NOTE — CONSULTS
Palliative Care:  Pt resting in bed, awake, alert and oriented. Very pleasant 80 yr old who presents to ED with c/o abd pain. Family reports pt has weakness and poor intake. Past Medical History:        Past Medical History:   Diagnosis Date    A-fib ([address]     CPAP (continuous positive airway pressure) dependence     9cm    DM (diabetes mellitus screen)     Hyperlipidemia     Hypertension     Hyperthyroidism     Left carotid bruit     Macular degeneration     Obstructive sleep apnea     AHI:  15.3       Advance Directives:  Currently pt is a full code. Son is POA. There is not any paperwork on file. Spoke with  Mr. Meenu Fisher he will bring POA paperwork in today. Pain/Other Symptoms:   Pt c/o stiff/soreness in her feet. Nurse aware and will admin Tylenol. Activity:   As db          Psychological/Spiritual:   Pt has good family support. Plan:  Pt INR elevated(18.8 on arrival to ED), nurse reports INR of 3.2 today. Monitor INR prior to colonoscopy. PT/OT cons    Patient/family discussion r/t goals:  Pt would like to return to her home. She tells me \"I think they are going to do a colonoscopy today\". Pt states she lives alone and her son and/or grandchildren are there daily. Pt states she is able to care for herself. Amb with a walker. Pt tells me she did have a fall recently, unsure of exact time. PC RN will call and speak with pt POA to clarify code status and pt wishes stated in [place]. Palliative care will follow fo support, goals of care.               Electronically signed by Saadia Dong RN on 1/13/2021 at 1:31 PM

## 2021-01-13 NOTE — PROGRESS NOTES
Physical Therapy    Facility/Department: [facility] SURG SERVICES  Initial Assessment    NAME: Aniket Guido  : 5/3/1925  MRN: [id number]    Date of Service: 2021    Discharge Recommendations:  Continue to assess pending progress, Patient would benefit from continued therapy after discharge(ANTICIPATE pt BEING ABLE TO D/C HOME WITH FAMILY ASSIST.)        Assessment   Body structures, Functions, Activity limitations: Decreased functional mobility ; Decreased endurance; Increased pain;Decreased strength;Decreased posture  Assessment: ANTICIPATE Pt TO PROGRESS WITH MOBILITY ONCE PAIN IN FEET HAS DIMINISHED. Prognosis: Good  Decision Making: Low Complexity  PT Education: Gait Training;General Safety; Functional Mobility Training;Transfer Training;Plan of Care  REQUIRES PT FOLLOW UP: Yes  Activity Tolerance  Activity Tolerance: Patient Tolerated treatment well;Patient limited by pain       Patient Diagnosis(es): The primary encounter diagnosis was Coagulopathy ([facility] [address] Diagnoses of Gastrointestinal hemorrhage, unspecified gastrointestinal hemorrhage type, Diarrhea, unspecified type, Hypotension due to hypovolemia, CIELO (acute kidney injury) ([address] Hypocalcemia, Hypokalemia, and Hypomagnesemia were also pertinent to this visit. has a past medical history of A-fib ([address] CPAP (continuous positive airway pressure) dependence, DM (diabetes mellitus screen), Hyperlipidemia, Hypertension, Hyperthyroidism, Left carotid bruit, Macular degeneration, and Obstructive sleep apnea. has a past surgical history that includes Cholecystectomy; Appendectomy; pacemaker placement; Bladder surgery; jackson and bso (cervix removed); Upper gastrointestinal endoscopy (N/A, 2021); and Colonoscopy (N/A, 2021).     Restrictions     Vision/Hearing  Hearing: Exceptions to [facility]  Hearing Exceptions: Hard of hearing/hearing concerns;Bilateral hearing aid     Subjective  General  Diagnosis: entercolitis  Other (Comment): [facility]  Subjective Subjective: Pt reports her feet are very tender and sore. RN notified. pT is moving to the floor from ICU. dtr present and states the goal is for pT to return home upon d/c  Pain Screening  Patient Currently in Pain: No          Orientation     Social/Functional History  Social/Functional History  Lives With: Spouse  Type of Home: House  Home Access: Stairs to enter with rails  Home Equipment: 4 wheeled walker  Ambulation Assistance: Independent  Transfer Assistance: Independent  Cognition        Objective          AROM RLE (degrees)  RLE AROM: WFL  AROM LLE (degrees)  LLE AROM : WFL  Strength Other  Other: antigravity bilat le's        Bed mobility  Supine to Sit: Minimal assistance; Moderate assistance  Transfers  Sit to Stand: Minimal Assistance; Moderate Assistance  Stand to sit: Minimal Assistance;Contact guard assistance  Bed to Chair: Minimal assistance; Moderate assistance  Stand Pivot Transfers: Minimal Assistance; Moderate Assistance  Comment: difficulty taking steps due to soreness in feet  Ambulation  Ambulation?: Yes  Ambulation 1  Device: Standard Walker  Assistance: Minimal assistance  Quality of Gait: difficulty with weight shift for taking steps to recliner. kept letting go of rw and reaching for recliner. flexed posture  Gait Deviations: Slow Marilee;Decreased step height;Decreased step length;Shuffles  Distance: 3 ft  Comments: limited by soreness in feet              Plan   Plan  Times per week: 5-7  Plan weeks: 2  Current Treatment Recommendations: Strengthening, Safety Education & Training, Patient/Caregiver Education & Training, Functional Mobility Training, Transfer Training, Gait Training, Pain Management, Positioning  Plan Comment: PROGRESS AS TOLERATED.   Safety Devices  Type of devices: (PLACED IN TRANSPORT CHAIR WITH RN PRESENT.)    G-Code       OutComes Score                                                  AM-PAC Score             Goals  Short term goals Time Frame for Short term goals: 2 wks  Short term goal 1: SUP<>SIT, SBA  Short term goal 2: SIT<>STAND, SBA  Short term goal 3:  FT WITH RW, CGA       Therapy Time   Individual Concurrent Group Co-treatment   Time In           Time Out           Minutes                   Drew Garcia PT    Electronically signed by Drew Garcia PT on 1/13/2021 at 10:11 AM

## 2021-01-13 NOTE — PROGRESS NOTES
Rt note-pt uses CPAP at home did not bring home machine does not know home settings -placed pt on AUTOPAP

## 2021-01-14 NOTE — PROGRESS NOTES
Palliative follow up visit. Pt is up in recliner this morning with assist from PT. Resting, awakens easily to my voice. States she is sleepy and tired. Denies pain. Palliative will follow up PRN for support/goals.   Electronically signed by González Temple RN on 1/14/2021 at 11:46 AM

## 2021-01-14 NOTE — PROGRESS NOTES
GI  - PROGRESS NOTE    Subjective:   Admit Date: 1/12/2021  PCP: Malik Alexandre MD    CC: Loose stools, dark stools, anemia, history of enterocolitis, hematemesis    Pt seen and examined. Spoke with the patient's family were yesterday and there is no true history of hematemesis. While in the hospital, she has not had any episodes of hematemesis, coffee-ground emesis, bright red blood per rectum, or hematochezia. Medications:  Scheduled Meds:   potassium chloride  20 mEq Oral Once    cefTRIAXone (ROCEPHIN) IV  1 g Intravenous Q24H    insulin lispro  0-6 Units Subcutaneous TID WC    insulin lispro  0-3 Units Subcutaneous Nightly    pantoprazole  40 mg Intravenous BID    And    sodium chloride (PF)  10 mL Intravenous BID    metroNIDAZOLE  500 mg Intravenous Q8H    levothyroxine  75 mcg Oral Daily    sodium chloride flush  10 mL Intravenous 2 times per day    [Held by provider] cholestyramine  1 packet Oral BID       Continuous Infusions:   lactated ringers 75 mL/hr at 01/13/21 0836    sodium chloride         PRN Meds:.sodium chloride, sodium chloride flush, promethazine **OR** ondansetron, acetaminophen **OR** acetaminophen, potassium chloride **OR** potassium alternative oral replacement **OR** potassium chloride, magnesium sulfate    Allergies: Patient has no known allergies.     Labs:     Recent Labs     01/13/21 0413 01/13/21  1746 01/14/21  0341   WBC 3.5* 3.8* 3.4*   RBC 3.14* 3.18* 3.15*   HGB 9.0* 9.2* 9.3*   HCT 26.8* 26.9* 26.6*   MCV 85.4 84.6 84.4   MCH 28.7 28.9 29.5   MCHC 33.6 34.2 35.0    182 175     Recent Labs     01/12/21  0904 01/13/21  0413 01/14/21  0341   * 134* 134*   K 2.5* 2.4* 2.6*   ANIONGAP 13 14 9    105 107   CO2 13* 15* 18*   BUN 14 14 12   CREATININE 1.6* 1.5* 1.3*   GLUCOSE 143* 77 82   CALCIUM 6.8* 7.6* 7.2*     Recent Labs     01/12/21  0904 01/13/21  0413 01/14/21  0341   MG 1.6* 2.0 2.2   PHOS 2.1* 2.8 2.3*     Recent Labs     01/12/21 0904   AST 18   ALT 6   BILITOT 0.4   ALKPHOS 59     HgBA1c:  No components found for: HGBA1C  FLP:    Lab Results   Component Value Date    TRIG 99 12/30/2020    HDL 32 12/30/2020    LDLCALC 54 12/30/2020     TSH:    Lab Results   Component Value Date    TSH 1.220 12/10/2020     Troponin T:   Recent Labs     01/12/21  0904 01/12/21  1551 01/12/21  1940   TROPONINI 0.08* 0.06* 0.06*     INR:   Recent Labs     01/12/21  1003 01/13/21  0413 01/14/21  0341   INR >18.80* 3.20* 1.61*       Recent Labs     01/12/21  0904   LIPASE 9*     -----------------------------------------------------------------  RAD:   Ct Abdomen Pelvis Wo Contrast Additional Contrast? None    Result Date: 1/12/2021 CT ABDOMEN PELVIS WO CONTRAST 1/12/2021 10:12 AM HISTORY: Vomiting with abdominal pain, distention and hypotension COMPARISON: 12/29/2020 DLP: 1842 mGy cm. Automated exposure control was utilized to diminish patient radiation dose. TECHNIQUE: Noncontrast enhanced images of the abdomen and pelvis obtained without oral contrast. FINDINGS: There is a small amount of air projecting along the anterior margin of the cardiac silhouette suspected to represent a small amount of air entrapped within the right atrium-likely related to intravenous infusion. Coronary artery calcifications are present. A transvenous pacer is in place. A small left-sided and trace right-sided effusion are observed. Bibasilar atelectasis is present. A small hiatal hernia is present. Leida Libman LIVER: The 7 mm hypodensity previously described within the left lobe of the liver is stable. No additional hepatic lesions are demonstrated. Leida Libman BILIARY SYSTEM: The gallbladder has been surgically removed. There is moderate extrahepatic biliary dilatation unchanged from the previous exam.. PANCREAS: Multiple areas of cystic change are noted within the pancreas similar in appearance to the previous examination. May represent pancreatic ductal dilatation versus complications of previous pancreatitis or mucinous cystic neoplasm. Continued follow-up will be suggested. No radiographic evidence of acute pancreatitis. . SPLEEN: Unremarkable. KIDNEYS AND ADRENALS: The adrenals are unremarkable. There is a nonobstructing calculus involving the lower pole calyx of the right kidney. .  The ureters are decompressed and normal in appearance. RETROPERITONEUM: There is heavy atheromatous calcification of the abdominal aorta. No evidence of aneurysm. No evidence of retroperitoneal hematoma or adenopathy. Leida Libman GI TRACT: There is an abnormal bowel gas pattern with multiple fluid-filled loops of small bowel and colon with air-fluid levels. Findings would suggest an ongoing enterocolitis.  There CT abdomen and pelvis 12/29/2020 3:44 PM HISTORY: Diarrhea TECHNIQUE: Axial images of the abdomen and pelvis were obtained without IV contrast. Coronal and sagittal reformatted images are reconstructed and reviewed. COMPARISON: 12/23/2020. DLP: 1283 mGy cm Automated exposure control was utilized to minimize patient radiation dose. FINDINGS: A 7 mm hypodensity in the left hepatic lobe similar and favorable for an incidental cyst. Spleen remains similar in size and appearance. Accessory splenule seen inferior to the splenic hilum on image 21. There are scattered cystic foci the pancreas is which may be pancreatic cysts, intraductal papillary mucinous neoplasms, or cystic dilatation of pancreatic duct. Overall appearance is similar to the recent exam. No peripancreatic inflammation. Cholecystectomy clips. No suspicious adrenal nodule. No abnormal perinephric fluid collection. No hydronephrosis. Latter underdistended. Uterus absent. There is fluid contained throughout the small and large bowel. There is no radiographic evidence of bowel obstruction. No free air or abscess. Stomach is under distended. Moderate size hiatal hernia. Few sigmoid diverticuli with no adjacent inflammation. There is2 diffuse vascular calcification with no aneurysm. Cardiac pacer device. Myocardium likely. Trace pericardial fluid. No pleural effusions. Visible lung bases are unremarkable. Osteopenia and degenerative change of the regional skeleton. Chronic compression of superior plate of L4. Levoscoliotic curvature of the lumbar spine. 1. There is fluid contained throughout the nondilated small and large bowel loops which may be seen with an infectious/inflammatory enterocolitis. No prominent abnormal bowel wall thickening. No evidence of bowel obstruction. No free air or abscess. 2. Multiple scattered cystic lesions of the pancreas may represent cysts, intraductal papillary mucinous neoplasms, or cystic distention pancreatic duct. Finding is not acute and there is no peripancreatic inflammation. Comparison outside films would be helpful to assess chronicity of the finding. If no outside studies, follow-up nonemergent outpatient CT or MR imaging with and without IV contrast utilizing pancreatic protocol would be best for further evaluation. If Patient unable to tolerate IV contrast, short-term follow-up CT exam in 3 months recommended to reassess. 3. Probable subcentimeter lateral left hepatic cyst. 4. Prior cholecystectomy, hysterectomy, and appendectomy. 3. Cardiac pacer device. Mild cardiomegaly with trace pericardial fluid. 5. Diffuse vascular calcification with no regional aneurysm.  Signed by Dr Justyn Pérez on 12/29/2020 5:04 PM    Ct Abdomen Pelvis Wo Contrast Additional Contrast? None    Result Date: 12/23/2020 Examination. CT ABDOMEN PELVIS WO CONTRAST 12/23/2020 1:50 PM History: History of diarrhea. DLP: 1057 mGycm. The CT scan of the abdomen and pelvis is performed without intravenous contrast enhancement. The images are acquired in axial plane with subsequent reconstruction in coronal and sagittal plane. There is no previous study for comparison. The lung bases included in the study show scattered areas of scarring and a few calcified granulomas. The limited included cardiomediastinal structures show atheromatous changes of the coronary arteries. There is a moderate size hiatal hernia. Unenhanced liver and spleen appear unremarkable. The gallbladder is surgically absent. There is no significant dilatation of common bile duct. There is a low-density area located in the head of the pancreas adjacent and lateral to the distal superior mesenteric vein measuring 1.5 cm in diameter. The CT density suggest a cyst. The remaining pancreas with diffuse nodular. The possibility of ductal dilatation is not excluded. There are degenerative glands bilaterally are normal. Moderate lobulation of renal contour bilaterally seen. There is a 3 mm radiopaque calculus in the lower pole of the right kidney. No calculi in the left kidney. No hydronephrosis on either side. The limited visualized ureters are unremarkable. The urinary bladder is moderately well distended. There is a tiny air bubble adjacent to the anterior wall of the urinary bladder which may be due to instrumentation or infection/inflammation. The uterus is surgically absent. No adnexal masses. The stomach is unremarkable. The duodenum is normal. Nondistended fluid-filled small bowel loops are seen. Appendix is surgically absent. Fluid-filled moderately dilated large bowel loops are seen. There are air-fluid levels. Fluid/liquid fecal material is seen in the sigmoid colon and rectum. No finding to suggest obstruction. There is diverticulosis of the distal colon.  No finding to suggest diverticulitis. Severe atheromatous changes of the abdominal aorta and iliac arteries are seen. There are large atheromatous plaques at the origin of the celiac and superior mesenteric arteries. There is no evidence of abdominal or pelvic lymphadenopathy. There are borderline prominent mesenteric nodes. The images reviewed in bone window show chronic degenerative changes of the lumbar spine. There is a moderate levoscoliosis. The superior endplate compression of vertebra L4 which appears chronic. Moderate diffuse demineralization the bony structures. Nondistended fluid-filled small bowel loops and moderately dilated fluid filled large bowel loops may represent an acute enterocolitis, ileus or diarrhea status. No obstruction. The diverticulosis of the distal colon. No evidence for diverticulitis. A low-density nodule in the head of the pancreas which may represent a cyst, pseudocyst or a cystic neoplasm. Further evaluation with contrast enhanced CT scan of the abdomen with pancreatic protocol may be obtained. A 3 mm nonobstructing calculus lower pole of the right kidney.  Signed by Dr Tom Painting on 12/23/2020 3:08 PM    Ct Chest Wo Contrast    Result Date: 12/30/2020 EXAMINATION: CT CHEST WO CONTRAST 12/30/2020 8:35 AM HISTORY: Paratracheal mass Comparison: Chest x-ray 12/29/2020 Technique: Sequential imaging was performed from the thoracic inlet through the upper abdomen without the use of IV contrast. Sagittal and coronal reformations were made from the original source data and reviewed. Automated exposure control was utilized for radiation dose reduction. Radiation dose:  mGy-cm Findings: a low-density lesion is seen in the right thyroid lobe measuring 7 mm in size. Thyroid gland is otherwise unremarkable. The trachea and main bronchi appear widely patent and in normal anatomic position. The esophagus is grossly normal in appearance. There is a small hiatal hernia. A left subclavian approach dual lead cardiac pacing device is in place with tips adjacent to the right atrium and right ventricle. No pathologically enlarged axillary, mediastinal, or hilar lymph nodes are identified when allowing for limitations of a noncontrast exam. Heart appears normal in size. There is trace pericardial fluid. Coronary artery calcifications are present. Atherosclerotic calcifications are seen in the aorta and its branch vessels. The ascending thoracic aorta and main pulmonary artery appear normal in caliber. Prominence of the right paratracheal region appears to be related to the underlying SVC. There are dependent changes in the lung bases. There is some nodularity in the left lung base adjacent to the hemidiaphragm, presumably atelectasis. There is a calcified granuloma in the left lower lobe. There is a small noncalcified nodule in the right lower lobe adjacent to the hemidiaphragm measuring 4 mm (axial image 94). There is a groundglass nodule measuring 6 mm in the peripheral right lower lobe (axial image 81). An additional 4 mm groundglass nodule is seen in the superior right lower lobe (axial image 62).  Several additional small noncalcified groundglass nodules are seen in the peripheral right lower lobe (axial images 83-90) additional tiny groundglass nodules are seen throughout the right upper lobe. Review of the visualized portion of the upper abdomen demonstrates nonobstructive right renal stone. There is a nodule in the upper abdomen adjacent to the hemidiaphragm measuring 7 mm in size. There is intra and extrahepatic biliary ductal dilatation status post cholecystectomy. Multiple cystic lesions are noted throughout the pancreas which are not well evaluated by CT. There is a small right adrenal nodule. Review of the visualized osseous structures demonstrates no acute or aggressive lesions. Old right-sided rib fractures are present. Patient appears osteopenic. There are degenerative changes in the spine. Impression: 1. No evidence of a right paratracheal mass. 2. Multiple groundglass nodules bilaterally suggest an inflammatory process. 3. Additional noncalcified pulmonary nodules are present. If patient is considered high risk, follow-up CT should be considered in 12 months per Fleischner Society guidelines. If the patient is considered low risk, no follow-up is recommended. 4. Atherosclerosis of the aorta and coronary arteries. 5. Small hiatal hernia. 6. Multiple pancreatic lesions are incompletely evaluated. Consider follow-up nonemergent MRI abdomen with and without contrast with MRCP.  Signed by Dr Rossie Bumpers on 12/30/2020 8:51 AM    Us Renal Complete    Result Date: 12/30/2020 Examination. US RENAL COMPLETE 12/30/2020 6:12 AM History: Acute renal injury. The ultrasound examination of the kidneys bilaterally is performed. There is no previous study for comparison. The study is of limited diagnostic value due to patient's body habitus and equipment limitation. The right kidney measures 8.7 x 4 x 3.9 cm. Moderate lobulation of renal contour. No discrete mass or hydronephrosis. The renal cortex measures 0.8 cm. Left kidney measures 9.5 x 3.5 x 4.5 cm. No evidence of mass. No hydronephrosis. The renal cortex measures 1.2 cm. The urinary bladder is incompletely distended with moderate symmetrical thickening of the walls. No obvious intrinsic abnormality. A limited diagnostic study. No discrete renal mass or hydronephrosis. Signed by Dr Kelsey Espinoza on 12/30/2020 7:42 AM    Xr Chest Portable    Result Date: 1/12/2021  Examination. XR CHEST PORTABLE 1/12/2021 8:24 AM History: Hypotension and vomiting. A single frontal portable upright view of the chest is compared with the previous study dated 1249 2020. There is left lower lobar consolidation and left basal pleural effusion which was not seen in the previous study. The lungs are poorly inflated. There is no pulmonary congestion or pneumothorax. There is moderate cardiomegaly. A dual-chamber cardiac pacer is seen in place. No acute bony abnormality. Left lower lobar consolidation and left basal pleural effusion.  Signed by Dr Kelsey Espinoza on 1/12/2021 9:34 AM    Xr Chest Portable    Result Date: 12/29/2020 EXAMINATION: Chest one view 12/29/2020 HISTORY: Dyspnea FINDINGS: Upright frontal projection of the chest demonstrates a focal bulge in the right paratracheal contour in the superior mediastinum. There is no displacement of the trachea. This may simply be related to a tortuous great vessel but an aneurysm of a great vessel or paratracheal mass is not entirely excluded. Unfortunately, I do not have any prior chest radiographs available for comparison purposes. Follow-up with enhanced CT imaging of the chest could BE obtained to better characterize this finding. The mediastinal contours are otherwise unremarkable. There is atheromatous calcification tortuosity of the thoracic aorta. There is a granuloma within the left lung base. Transvenous pacer is well positioned. 1.. Focal bulging of the right paratracheal stripe in the superior mediastinum. Differential considerations are as above but do include paratracheal adenopathy or mass within the medial right apex. Follow-up with CT imaging of the chest with IV contrast could BE obtained to better characterize this finding. 2. Lungs are otherwise clear.  Signed by Dr Mendez Yanez on 12/29/2020 5:06 PM    Egd    Result Date: 1/1/2021  No dictation     Colonoscopy    Result Date: 1/1/2021  No dictation       Physical Exam:     Vitals:    01/13/21 2151 01/14/21 0320 01/14/21 0712 01/14/21 1032   BP: (!) 150/74 138/64 138/63 130/63   Pulse: 66 64 67 66   Resp: 15 16 16 16   Temp: 97.2 °F (36.2 °C) 97.6 °F (36.4 °C) 97.2 °F (36.2 °C) 97.8 °F (36.6 °C)   TempSrc: Temporal Temporal     SpO2: 94% 94% 92% 95%   Weight:       Height:         24HR INTAKE/OUTPUT:      Intake/Output Summary (Last 24 hours) at 1/14/2021 1306  Last data filed at 1/14/2021 0320  Gross per 24 hour   Intake 3068 ml   Output    Net 3068 ml       Constitutional: [x] NAD, [x] of stated age, [x] well nourished  Eyes: [x] conjunctiva clear, [x] Non inflamed irises, [x] no scleral icterus ENT/Mouth: [x]  Nares patent with pink mucosa, [x] oropharynx clear without exudates or erythema, [x] significant hearing deficit  Head/Neck: [x] symmetrical, [x] supple  Lungs: [x] respirations non labored with good effort, [x] CTA bilaterally, [x] no respiratory distress  Heart: [x] normal S1S2, [x] pedal pulses preserved 2/4 bilaterally, [x] no LE edema  Abdomen: [x] +BSx4, [x] NTND, [x] soft, [x] no guarding, [x] no peritoneal signs  Musculoskeletal: [x]  Normal nails bilaterally, [x] Normal digits bilaterally, [x] no muscle atrophy  Skin/SubQ: [x] No jaundice, [x] warm, dry skin, [x] no rashes on inspection, [x] darkening of skin on LEs  Psychiatric: [x]  Orientated to person, place, and time; [x] mood and affect unremarkable, [x] memory recent and remote fair      Impression:       1. Dark stools  2. Diarrhea  3. Supratherapeutic INR  4.  UTI    Plan:   -Patient remains hemodynamically stable with no evidence of active GI bleeding. Her BUN remains normal at 12 is not consistent with active upper GI bleeding. In addition, her hemoglobin is stable at 9.3 over multiple measurements, therefore with a recent EGD/colonoscopy within 2 weeks no immediate endoscopic evaluation indicated  Would recommend Protonix 40 mg IV twice daily while inpatient. Then switch to Protonix 40 mg p.o. twice daily x8 weeks.   Follow-up with the GI clinic to ensure she receives her biopsy results from her January 1, 2021 EGD/colonoscopy  No further GI intervention at this time    Canaan Livers, DO

## 2021-01-14 NOTE — PROGRESS NOTES
Occupational Therapy   Occupational Therapy Initial Assessment  Date: 2021   Patient Name: Bette Green  MRN: [id number]     : 5/3/1925    Date of Service: 2021    Discharge Recommendations:  Patient would benefit from continued therapy after discharge       Assessment   Assessment: Evaluation completed and tx initiated. The patient would benefit from further therapy prior to discharge home. Unsure of spouse's capabilities  Treatment Diagnosis: Enterocolitis, weakness  REQUIRES OT FOLLOW UP: Yes  Activity Tolerance  Activity Tolerance: Patient Tolerated treatment well  Safety Devices  Safety Devices in place: Yes  Type of devices: Bed alarm in place;Call light within reach;Nurse notified           Patient Diagnosis(es): The primary encounter diagnosis was Coagulopathy ([facility] Diagnoses of Gastrointestinal hemorrhage, unspecified gastrointestinal hemorrhage type, Diarrhea, unspecified type, Hypotension due to hypovolemia, CIELO (acute kidney injury) ([facility] [address] Hypocalcemia, Hypokalemia, and Hypomagnesemia were also pertinent to this visit. has a past medical history of A-fib ([facility] [address] CPAP (continuous positive airway pressure) dependence, DM (diabetes mellitus screen), Hyperlipidemia, Hypertension, Hyperthyroidism, Left carotid bruit, Macular degeneration, and Obstructive sleep apnea. has a past surgical history that includes Cholecystectomy; Appendectomy; pacemaker placement; Bladder surgery; jackson and bso (cervix removed); Upper gastrointestinal endoscopy (N/A, 2021); and Colonoscopy (N/A, 2021).     Treatment Diagnosis: Enterocolitis, weakness      Restrictions  Restrictions/Precautions  Restrictions/Precautions: Fall Risk    Subjective   General  Chart Reviewed: Yes  Patient assessed for rehabilitation services?: Yes  Family / Caregiver Present: No  Patient Currently in Pain: Yes  Pain Assessment  Pain Assessment: 0-10  Pain Level: 2  Patient's Stated Pain Goal: No pain  Pain Type: Chronic pain Pain Location: Generalized  Functional Pain Assessment: Activities are not prevented  Non-Pharmaceutical Pain Intervention(s): Ambulation/Increased Activity;Repositioned  Response to Pain Intervention: Patient Satisfied  Vital Signs  Temp: 97.8 °F (36.6 °C)  Temp Source: Temporal  Pulse: 66  Heart Rate Source: Monitor  Resp: 15  BP: (!) 155/70  BP Location: Left Arm  Patient Currently in Pain: Yes  Oxygen Therapy  SpO2: 97 %  O2 Device: None (Room air)  Social/Functional History  Social/Functional History  Lives With: Spouse  Type of Home: House  Home Access: Stairs to enter with rails  Home Equipment: 4 wheeled walker  Ambulation Assistance: Independent  Transfer Assistance: Independent       Objective        Orientation  Overall Orientation Status: Impaired  Orientation Level: Oriented to place; Disoriented to time;Oriented to person     Balance  Sitting Balance: Supervision  Standing Balance: Minimal assistance  ADL  Feeding: Independent;Setup  Grooming: Supervision;Setup  UE Bathing: Minimal assistance  LE Bathing: Minimal assistance; Moderate assistance  UE Dressing: Minimal assistance  LE Dressing: Moderate assistance  Toileting: Moderate assistance        Bed mobility  Sit to Supine: Minimal assistance; Moderate assistance  Transfers  Transfer Comments: Marval Ghee used to transfer from recliner into the bathroom. Sit to stand from using safety bar from recliner was min A. CGA from raised toilet. Stood  with CGA to min A using safety bar in the Marval Ghee. Per chart review, moderate assist with stand step. Cognition  Cognition Comment: Good at using context clues to understand what commands are being given, very [address] despite hearing aides. Verbal and appropriate. Requires supervision overall                                     Tx initiated:   Toileting, eating, standing balance, trunk exercises (30 mins)     Plan   Plan  Times per week: 3-5    G-Code     OutComes Score AM-PAC Score             Goals  Short term goals  Short term goal 1: Independent with therapeutic exercises and activities  Short term goal 2: Perform transfers with min A  Short term goal 3: Perform dressing tasks with min A  Short term goal 4: Perform toileting with min A       Therapy Time   Individual Concurrent Group Co-treatment   Time In           Time Out           Minutes                   Von Longo OT Electronically signed by Von Longo OT on 1/14/2021 at 2:22 PM

## 2021-01-14 NOTE — PROGRESS NOTES
Physical Therapy  Name: Isabel Barcenas  MRN:  [id number]  Date of service:  1/14/2021 01/14/21 1118   General   Chart Reviewed Yes   Subjective   Subjective Pt in bed, can't decide if she needs to pee or not, appears to be confused but is willing to sit up in recliner after some encouragement. Pain Screening   Patient Currently in Pain Denies   Bed Mobility   Supine to Sit Minimal assistance   Scooting Minimal assistance   Comment Sits EOB x5 min before attempting to stand   Transfers   Sit to Stand Moderate Assistance   Stand to sit Moderate Assistance   Stand Pivot Transfers Moderate Assistance   Comment Difficulty coming to full standing and with taking any steps   Short term goals   Time Frame for Short term goals 2 wks   Short term goal 1 SUP<>SIT, SBA   Short term goal 2 SIT<>STAND, SBA   Short term goal 3  FT WITH RW, CGA   Conditions Requiring Skilled Therapeutic Intervention   Body structures, Functions, Activity limitations Decreased functional mobility ; Decreased endurance; Increased pain;Decreased strength;Decreased posture   Assessment Pt requires assist for all bed mobility and to stand/pivot from bed to recliner, declines taking any steps at this time. Pt in recliner with all needs in reach following tx. Activity Tolerance   Activity Tolerance Patient limited by pain; Patient limited by endurance   Safety Devices   Type of devices Call light within reach; Chair alarm in place;Gait belt;Left in chair         Electronically signed by Chapincito Lawton PTA on 1/14/2021 at 11:24 AM

## 2021-01-14 NOTE — PROGRESS NOTES
RBC 3.18 (L) 4.20 - 5.40 M/uL    Hemoglobin 9.2 (L) 12.0 - 16.0 g/dL    Hematocrit 26.9 (L) 37.0 - 47.0 %    MCV 84.6 81.0 - 99.0 fL    MCH 28.9 27.0 - 31.0 pg    MCHC 34.2 33.0 - 37.0 g/dL    RDW 16.5 (H) 11.5 - 14.5 %    Platelets 082 802 - 054 K/uL    MPV 9.3 (L) 9.4 - 12.3 fL   POCT Glucose    Collection Time: 01/13/21  9:07 PM   Result Value Ref Range    POC Glucose 81 70 - 99 mg/dl    Performed on AccuChek    Protime-INR    Collection Time: 01/14/21  3:41 AM   Result Value Ref Range    Protime 19.3 (H) 12.0 - 14.6 sec    INR 1.61 (H) 0.88 - 1.18   CBC    Collection Time: 01/14/21  3:41 AM   Result Value Ref Range    WBC 3.4 (L) 4.8 - 10.8 K/uL    RBC 3.15 (L) 4.20 - 5.40 M/uL    Hemoglobin 9.3 (L) 12.0 - 16.0 g/dL    Hematocrit 26.6 (L) 37.0 - 47.0 %    MCV 84.4 81.0 - 99.0 fL    MCH 29.5 27.0 - 31.0 pg    MCHC 35.0 33.0 - 37.0 g/dL    RDW 16.7 (H) 11.5 - 14.5 %    Platelets 021 689 - 424 K/uL    MPV 9.5 9.4 - 12.3 fL   Basic Metabolic Panel    Collection Time: 01/14/21  3:41 AM   Result Value Ref Range    Sodium 134 (L) 136 - 145 mmol/L    Potassium 2.6 (LL) 3.5 - 5.0 mmol/L    Chloride 107 98 - 111 mmol/L    CO2 18 (L) 22 - 29 mmol/L    Anion Gap 9 7 - 19 mmol/L    Glucose 82 74 - 109 mg/dL    BUN 12 8 - 23 mg/dL    CREATININE 1.3 (H) 0.5 - 0.9 mg/dL    GFR Non- 38 (A) >60    GFR  46 (L) >59    Calcium 7.2 (L) 8.2 - 9.6 mg/dL   Magnesium    Collection Time: 01/14/21  3:41 AM   Result Value Ref Range    Magnesium 2.2 1.7 - 2.3 mg/dL   Albumin    Collection Time: 01/14/21  3:41 AM   Result Value Ref Range    Alb 2.1 (L) 3.5 - 5.2 g/dL   Phosphorus    Collection Time: 01/14/21  3:41 AM   Result Value Ref Range    Phosphorus 2.3 (L) 2.5 - 4.5 mg/dL   POCT Glucose    Collection Time: 01/14/21  8:06 AM   Result Value Ref Range    POC Glucose 89 70 - 99 mg/dl    Performed on AccuChek        I/O last 3 completed shifts: In: 3222 [P.O.:240;  I.V.:2388; IV Piggyback:800]  Out: - No intake/output data recorded.       Current Facility-Administered Medications:     potassium chloride (KLOR-CON M) extended release tablet 20 mEq, 20 mEq, Oral, Once, Yusra Osborn MD    potassium chloride (KLOR-CON M) extended release tablet 20 mEq, 20 mEq, Oral, Once, Yusra Osborn MD    cefTRIAXone (ROCEPHIN) 1 g in sterile water 10 mL IV syringe, 1 g, Intravenous, Q24H, Yusra Osborn MD    insulin lispro (HUMALOG) injection vial 0-6 Units, 0-6 Units, Subcutaneous, TID WC, Miguel Clements MD    insulin lispro (HUMALOG) injection vial 0-3 Units, 0-3 Units, Subcutaneous, Nightly, Miguel Clements MD    lactated ringers infusion, , Intravenous, Continuous, Yusra Osborn MD, Last Rate: 75 mL/hr at 01/13/21 0836, New Bag at 01/13/21 0836    0.9 % sodium chloride infusion, , Intravenous, PRN, Gwen Morley MD    pantoprazole (PROTONIX) injection 40 mg, 40 mg, Intravenous, BID, 40 mg at 01/13/21 2117 **AND** sodium chloride (PF) 0.9 % injection 10 mL, 10 mL, Intravenous, BID, Yusra Osborn MD, 10 mL at 01/13/21 0732    metronidazole (FLAGYL) 500 mg in NaCl 100 mL IVPB premix, 500 mg, Intravenous, Q8H, Yusra Osborn MD, Last Rate: 100 mL/hr at 01/14/21 0758, 500 mg at 01/14/21 0758    levothyroxine (SYNTHROID) tablet 75 mcg, 75 mcg, Oral, Daily, Yusra Osborn MD, Stopped at 01/13/21 0720    sodium chloride flush 0.9 % injection 10 mL, 10 mL, Intravenous, 2 times per day, Yusra Osborn MD, 10 mL at 01/13/21 2117    sodium chloride flush 0.9 % injection 10 mL, 10 mL, Intravenous, PRN, Yusra Osborn MD    promethazine (PHENERGAN) tablet 12.5 mg, 12.5 mg, Oral, Q6H PRN **OR** ondansetron (ZOFRAN) injection 4 mg, 4 mg, Intravenous, Q6H PRN, Yusra Osborn MD    acetaminophen (TYLENOL) tablet 650 mg, 650 mg, Oral, Q6H PRN, 650 mg at 01/13/21 1328 **OR** acetaminophen (TYLENOL) suppository 650 mg, 650 mg, Rectal, Q6H PRN, Yusra Osborn MD   potassium chloride (KLOR-CON M) extended release tablet 40 mEq, 40 mEq, Oral, PRN **OR** potassium bicarb-citric acid (EFFER-K) effervescent tablet 40 mEq, 40 mEq, Oral, PRN **OR** potassium chloride 10 mEq/100 mL IVPB (Peripheral Line), 10 mEq, Intravenous, PRN, Grace Perdue MD, Last Rate: 100 mL/hr at 01/14/21 0758, 10 mEq at 01/14/21 0758    magnesium sulfate 2 g in 50 mL IVPB premix, 2 g, Intravenous, PRN, Grace Perdue MD, Stopped at 01/13/21 0717    [Held by provider] cholestyramine [place] [facility]ts) packet 4 g, 1 packet, Oral, BID, Grace Perdue MD      Assessment/Plan:     Principal Problem:    Enterocolitis  Active Problems:    Hypotension    Dark stools    Poor fluid intake    UTI (urinary tract infection)    Permanent atrial fibrillation (HCC)    ROSALES on CPAP    Chronic diastolic congestive heart failure (HCC)    Acute-on-chronic kidney injury (HCC)    Mild bibasilar atelectasis  Resolved Problems:    * No resolved hospital problems. [facility] course:   51-year-old female with permanent A. fib on Xarelto, CAD, chronic diastolic heart failure, ROSALES on CPAP, diabetes mellitus, hypertension, hyperlipidemia, hearing impaired, recently hospitalized 12/29 found to have enterocolitis and treated on broad-spectrum antibiotics, with negative GI work-up and colonoscopy done at that time. She is readmitted with ongoing enterocolitis (noted on CT imaging), E. coli UTI (susceptible to Rocephin), hypotension and CIELO (now improved with IV fluids), supratherapeutic INR over 18 initially (Xarelto held) in setting of reported dark stools. INR less than 2 now status post vitamin K and 2 units FFP. Poor p.o. intake with generalized weakness. GI following. On Rocephin and Flagyl.         Enterocolitis, with loose dark stools  E.  Coli UTI  Possible aspiration pneumonitis vs atelectasis  Hypotensionimproved  --Gentle IV hydration  --Urine culture positive for E. coli, susceptibilities reviewed

## 2021-01-15 PROBLEM — N17.9 ACUTE-ON-CHRONIC KIDNEY INJURY (HCC): Status: RESOLVED | Noted: 2021-01-01 | Resolved: 2021-01-01

## 2021-01-15 PROBLEM — A41.9 SEPSIS (HCC): Status: RESOLVED | Noted: 2021-01-01 | Resolved: 2021-01-01

## 2021-01-15 PROBLEM — I95.9 HYPOTENSION: Status: RESOLVED | Noted: 2021-01-01 | Resolved: 2021-01-01

## 2021-01-15 PROBLEM — N18.9 ACUTE-ON-CHRONIC KIDNEY INJURY (HCC): Status: RESOLVED | Noted: 2021-01-01 | Resolved: 2021-01-01

## 2021-01-15 NOTE — PROGRESS NOTES
Physical Therapy  Name: Mauro Aldana  MRN:  [id number]  Date of service:  1/15/2021     01/15/21 1116   Restrictions/Precautions   Restrictions/Precautions Fall Risk   General   Chart Reviewed Yes   Subjective   Subjective Pt in bed, agrees to get up to the recliner with therapy. Pain Screening   Patient Currently in Pain Denies   Bed Mobility   Rolling Minimal assistance   Supine to Sit Minimal assistance   Scooting Moderate assistance;2 Person assistance   Comment Assisted with bed mobility for BM cleanup   Transfers   Sit to Stand Minimal Assistance   Stand to sit Minimal Assistance   Bed to Chair Minimal assistance   Comment Able to stand with rwx   Ambulation   Ambulation? Yes   Ambulation 1   Surface level tile   Device Rolling Walker   Assistance Minimal assistance   Gait Deviations Slow Marilee;Decreased step height;Decreased step length;Shuffles   Distance 3'   Comments Vc's to keep rwx close, keeps letting go of rwx to reach for chair   Short term goals   Time Frame for Short term goals 2 wks   Short term goal 1 SUP<>SIT, SBA   Short term goal 2 SIT<>STAND, SBA   Short term goal 3  FT WITH RW, CGA   Conditions Requiring Skilled Therapeutic Intervention   Body structures, Functions, Activity limitations Decreased functional mobility ; Decreased endurance; Increased pain;Decreased strength;Decreased posture   Assessment Assisted pt with bed mobility for BM cleanup, able to perform STS with rwx with min assist and take steps toward recliner. Pt positioned for comfort with all needs in reach. Activity Tolerance   Activity Tolerance Patient limited by endurance   Safety Devices   Type of devices Call light within reach; Chair alarm in place;Gait belt;Left in chair         Electronically signed by Evy Parry PTA on 1/15/2021 at 11:29 AM

## 2021-01-15 NOTE — PLAN OF CARE
Nutrition Problem #1: Inadequate oral intake  Intervention: Food and/or Nutrient Delivery: Modify Current Diet, Start Oral Nutrition Supplement  Nutritional Goals: Progress to an oral diet with PO >50%, wt stable

## 2021-01-15 NOTE — PROGRESS NOTES
Comprehensive Nutrition Assessment    Type and Reason for Visit:  Reassess    Nutrition Recommendations/Plan: Modify diet to Clear Liquid. Start Ensure Clear TID. Nutrition Assessment:  Pt is nutritionally compromised d/t inadequate oral intake. HgbA1C below 6. Will remove CHO restriction to offer more options and promote PO intake. Will start ONS TID. Malnutrition Assessment:  Malnutrition Status: At risk for malnutrition (Comment)    Context:  Acute Illness     Findings of the 6 clinical characteristics of malnutrition:  Energy Intake:  Mild decrease in energy intake (Comment)  Weight Loss:  No significant weight loss     Body Fat Loss:  Unable to assess     Muscle Mass Loss:  Unable to assess    Fluid Accumulation:  No significant fluid accumulation     Strength:  Not Performed    Estimated Daily Nutrient Needs:  Energy (kcal):  5574-1206; Weight Used for Energy Requirements:  Current     Protein (g):  ; Weight Used for Protein Requirements:  Ideal(1.3-2.0)        Fluid (ml/day):  8465-1685; Method Used for Fluid Requirements:  1 ml/kcal        Current Nutrition Therapies:    DIET CLEAR LIQUID; Carb Control: 4 carb choices (60 gms)/meal    Anthropometric Measures:  · Height: 5' 2\" (157.5 cm)  · Current Body Weight: 152 lb (68.9 kg)   · Usual Body Weight: 165 lb (74.8 kg)(7/2020)     · Ideal Body Weight: 110 lbs  · BMI: 27.8  · BMI Categories: Overweight (BMI 25.0-29. 9)       Nutrition Diagnosis:   · Inadequate oral intake related to acute injury/trauma as evidenced by intake 0-25%, intake 26-50%    Nutrition Interventions:   Food and/or Nutrient Delivery:  Modify Current Diet, Start Oral Nutrition Supplement  Coordination of Nutrition Care:  Continue to monitor while inpatient    Goals:  Progress to an oral diet with PO >50%, wt stable       Nutrition Monitoring and Evaluation:   Food/Nutrient Intake Outcomes:  Diet Advancement/Tolerance, Food and Nutrient Intake Physical Signs/Symptoms Outcomes:  Biochemical Data, Skin, Weight     Electronically signed by Belgica Randolph, MS, RD, LD on 1/15/21 at 1:46 PM CST    Contact: [phone]

## 2021-01-15 NOTE — PROGRESS NOTES
Speech Language Pathology  Facility/Department: [facility] 5 SURG SERVICES   CLINICAL BEDSIDE SWALLOW EVALUATION    NAME: Tameka Brown  : 5/3/1925  MRN: [id number]    ADMISSION DATE: 2021  ADMITTING DIAGNOSIS: has Essential hypertension; Permanent atrial fibrillation ([address] Pure hypercholesterolemia; ROSALES on CPAP; CPAP (continuous positive airway pressure) dependence; Left carotid bruit; Blister (nonthermal), left lower leg, initial encounter; Coronary artery disease due to calcified coronary lesion; Obesity (BMI 30-39.9); Type 2 diabetes mellitus with diabetic polyneuropathy, without long-term current use of insulin ([address] Acquired hypothyroidism; Mild mitral regurgitation; Exogenous obesity; Chronic diastolic congestive heart failure ([address] Mild aortic regurgitation; Vitamin D deficiency; Stage 3 chronic kidney disease; SSS (sick sinus syndrome) ([address] Diarrhea; Pancreatic cyst; Enterocolitis; Hypotension; Dark stools; Poor fluid intake; Acute-on-chronic kidney injury ([address] UTI (urinary tract infection); Mild bibasilar atelectasis; and Sepsis ([address] on their problem list.    Date of Eval: 1/15/2021  Evaluating Therapist: Annalise Chicas    Reason for Referral  Tameka Brown was referred for a bedside swallow evaluation to assess the efficiency of her swallow function, identify signs and symptoms of aspiration and make recommendations regarding safe dietary consistencies, effective compensatory strategies, and safe eating environment. Impression  Assessed patient's swallowing function. Patient exhibits decreased oral prep of more solid consistencies, inconsistently fast oral transit and suspected swallow delay with thin liquids, and sluggish, mild-moderately decreased laryngeal elevation for swallow airway protection. Even so, no outward S/S penetration/aspiration was noted with any puree consistency trial, regular solid consistency trial, or thin H2O trial presented during evaluation this date. At this time, when cleared by MD, least restrictive diet recommendation would be regular solid consistency and thin liquids. Recommend meds whole in pudding/applesauce. Thank you for this consult. Treatment Plan  Requires SLP Intervention: Yes     Recommended Diet and Intervention  Diet Solids Recommendation: Regular solid    Liquid Consistency Recommendation: Thin   Recommended Form of Meds: Meds whole in puree as able     Compensatory Swallowing Strategies  Compensatory Swallowing Strategies: Upright as possible for all oral intake;TOTAL FEED;Small bites/sips;Eat/Feed slowly; Alternate solids and liquids; Remain upright for 30-45 minutes after meals    General  Chart Reviewed: Yes  Behavior/Cognition: Alert; Cooperative  O2 Device: None (Room air)  Communication Observation: (SLP ranked functional intelligibility of speech for unfamiliar listeners at 100% in utterances without background noise present.)  Follows Directions: Simple   Dentition: Adequate  Patient Positioning: Upright in bed  Consistencies Administered: Dysphagia Pureed (Dysphagia I); Regular solid; Thin - cup     Assessed patient's swallowing function with the following observations noted:     Oral Phase  Mastication: Regular solid (Patient exhibited decreased rotary jaw movement during oral prep of regular solid consistency trials presented by SLP.)  Suspected Premature Bolus Loss: Thin - cup (Patient exhibited inconsistently fast oral transit of thin H2O trials presented independently via cup.)  Oral Phase - Comment: Oral transit of puree consistency trials, presented by SLP, primarily measured 1-2 seconds in length and no oral cavity residue was noted post swallows. Min regular solid consistency residue was observed post swallows; residue cleared from the mouth with additional dry swallows. Pharyngeal Phase  Suspected Swallow Delay:  Thin - cup (Suspect secondary to oral transit times.) Laryngeal Elevation: (Patient exhibited sluggish, mild-moderately decreased laryngeal elevation for swallow airway protection.)  Pharyngeal Phase - Comment: No outward S/S penetration/aspiration was noted with any puree consistency trial, regular solid consistency trial, or thin H2O trial presented during evaluation this date. At this time, when cleared by MD, least restrictive diet recommendation would be regular solid consistency and thin liquids. Recommend meds whole in pudding/applesauce.     Electronically signed by GINETTE Coelho on 1/15/2021 at 2:11 PM

## 2021-01-15 NOTE — CARE COORDINATION
Anahi Blake    Date / Time of Evaluation: 1/15/2021 10:15 AM  Assessment Completed by: Zeina Briones    Patient Admission Status: Inpatient [101]      Current PCP:  Malik Alexandre MD    Initial Assessment Completed at bedside with:  family    Emergency Contacts:  Extended Emergency Contact Information  Primary Emergency Contact: Devonte Barry  Address: [address] [facility] [address] [facility], [address] [facility] [address] Phone: [phone]  Relation: Child  Secondary Emergency Contact: Kiya Castillo  Mobile Phone: [phone]  Relation: Grandchild    Advance Directives: Code Status:  Full Code    Financial:  Payor: MEDICARE / Plan: MEDICARE PART A AND B / Product Type: *No Product type* /     Pre-Cert required for SNF:  No    Pharmacy:   [address] [place], [place] [address]  Phone: [phone] Fax: [phone]      Potential assistance purchasing medications?   No    ADLS:  Support System:  Family Members    Current Home Environment:  Home with son and/or grandchildren assist  Steps:  Yes    Plans to RETURN to current housing: Yes  Barriers to RETURNING to current housing:  Yes    Currently ACTIVE with Home Health CARE:  Yes  [address]:  [address] [place] [address] Provider:  N/A    Has a pulse oximetry unit at home: NO    Had [address] [facility] prior to admission:  CPAP at   [address]:  N/A    Transition Plan:  Home with son and [address] [place] [address]    Transportation PLAN for Discharge:  son    Additional CM/SW Notes: I called Mr. Amanda Villalba, patient's son. He states that she lives with him and has [employer]. He states that she uses a walker at home. He states that she uses a CPAP at night only and no O2 during the day. He states that he would like to bring her home if at all possible. I explained to him that she is very weak and not doing very well with PT/OT. I explained to him that PT/OT have not worked with her today. No needs identified. Will continue to follow. Leonila and/or her family were provided with choice of provider.         Kaela Louis RN, BSN  [facility]  Care Management Department  Ph:  [phone] Fax: [phone]    Electronically signed by Kaela Louis RN, BSN on 1/15/2021 at 10:26 AM

## 2021-01-15 NOTE — PROGRESS NOTES
Received call from [facility] stating that patient was current with their services. Requested that they be informed whenever patient discharges so they can continue her services.  Electronically signed by Viri Hernández RN on 1/15/2021 at 9:35 AM

## 2021-01-15 NOTE — CARE COORDINATION
Patient is current with [facility].  Please fax DC Summary and AVS when discharged  P. [phone]  F. [phone]  Electronically signed by Denise Anaya on 1/15/21 at 10:11 AM CST

## 2021-01-15 NOTE — DISCHARGE SUMMARY
[facility], [address] [place] [address], [address] OF HOSPITALIST MEDICINE        DISCHARGE SUMMARY:      PATIENT NAME:  Hulan Schilder  :  5/3/1925  MRN:  [id number]    Admission Date:   2021  8:55 AM Attending: Nikki Michael MD   Discharge Date:   1/15/2021              PCP: Marc Cotton MD  Length of Stay: 3 days     Chief Complaint on Admission:   Chief Complaint   Patient presents with    Abdominal Pain       Consultants:     IP CONSULT TO GI  IP CONSULT TO DIETITIAN  PALLIATIVE CARE EVAL  IP CONSULT TO HOME CARE NEEDS  IP CONSULT TO CASE MANAGEMENT       Discharge Problem List:   Active Problems:    Poor fluid intake    UTI (urinary tract infection)    Essential hypertension    Permanent atrial fibrillation (HCC)    Pure hypercholesterolemia    ROSALES on CPAP    CPAP (continuous positive airway pressure) dependence    Coronary artery disease due to calcified coronary lesion    Type 2 diabetes mellitus with diabetic polyneuropathy, without long-term current use of insulin (HCC)    Acquired hypothyroidism    Chronic diastolic congestive heart failure (HCC)    Vitamin D deficiency    Stage 3 chronic kidney disease    Enterocolitis    Dark stools    Mild bibasilar atelectasis  Resolved Problems:    Hypotension    Acute-on-chronic kidney injury ([address]    Sepsis ([facility]       Last dated Assessment and Plan . .. 2021:    Enterocolitis, with loose dark stools  E.  Coli UTI  Possible aspiration pneumonitis vs atelectasis  Hypotensionimproved  --Gentle IV hydration  --Urine culture positive for E. coli, susceptibilities reviewed  Switch antibiotic coverage to Rocephin and Flagyl  GI on board, appreciate recs  IV PPI twice daily while inpatient, then discharge with PPI BID x 8 weeks  follow H/H, stable     Permanent Afib on Xarelto  Supratherapeutic INR, INR> 18 present on admission, improved  Anticoagulation held  given vitamin K and 2 units FFP 21  INR trended down     Acute renal insufficiency on CKD, prerenal  Improving with IV fluid     Electrolyte derangements  Hypokalemia, hypomagnesemia, hypophosphatemia, hypocalcemia  Monitor and replete electrolytes     Diabetes mellitus  Serial glucose monitoring with sliding scale insulin correction      HFpEF  --hold diuretic      ROSALES   - CPAP QHS     Generalized weakness  PT/OT     Poor p.o. intake  Speech and dietary evaluation      CUMULATIVE  HOSPITAL  COURSE  AND  TREATMENT:    27-year-old female with permanent A. fib on Xarelto, CAD, chronic diastolic heart failure, ROSALES on CPAP, diabetes mellitus, hypertension, hyperlipidemia, hearing impaired, recently hospitalized 12/29 found to have enterocolitis and treated on broad-spectrum antibiotics, with negative GI work-up and colonoscopy done at that time. She is readmitted with ongoing enterocolitis (noted on CT imaging), E. coli UTI (susceptible to Rocephin), hypotension and CIELO (now improved with IV fluids), supratherapeutic INR over 18 initially (Xarelto held) in setting of reported dark stools. INR less than 2 now status post vitamin K and 2 units FFP. Poor p.o. intake with generalized weakness. GI following. On Rocephin and Flagyl.         1/15/2021 Recent Labs     01/13/21 0413 01/14/21 0341 01/14/21  1629 01/15/21  0251   * 134*  --  136   K 2.4* 2.6* 3.4* 3.2*    107  --  108   CO2 15* 18*  --  17*   BUN 14 12  --  9   CREATININE 1.5* 1.3*  --  1.0*   GLUCOSE 77 82  --  73*     No results for input(s): AST, ALT, ALB, BILITOT, ALKPHOS in the last 72 hours. Troponin T:   Recent Labs     01/12/21  1551 01/12/21  1940   TROPONINI 0.06* 0.06*     Pro-BNP: No results for input(s): BNP in the last 72 hours. INR:   Recent Labs     01/13/21  0413 01/14/21  0341 01/15/21  0251   INR 3.20* 1.61* 1.19*     UA:No results for input(s): NITRITE, COLORU, PHUR, LABCAST, WBCUA, RBCUA, MUCUS, TRICHOMONAS, YEAST, BACTERIA, CLARITYU, SPECGRAV, LEUKOCYTESUR, UROBILINOGEN, BILIRUBINUR, BLOODU, GLUCOSEU, AMORPHOUS in the last 72 hours. Invalid input(s): Mindi Clarisse  A1C: No results for input(s): LABA1C in the last 72 hours. ABG:No results for input(s): PHART, YNV3YNG, PO2ART, IGP9QEZ, BEART, HGBAE, D0MVQGGF, CARBOXHGBART in the last 72 hours. Impressions of imaging performed in 48 hours before discharge:    No results found.       [facility]   Home Medication Instructions DHEERAJ:[id number]    Printed on:01/15/21 3749   Medication Information                      amLODIPine (NORVASC) 10 MG tablet  TAKE ONE TABLET BY MOUTH EVERY DAY             blood glucose test strips (FREESTYLE LITE) strip  USE ONE STRIP ONCE DAILY             carvedilol (COREG) 25 MG tablet  TAKE ONE TABLET BY MOUTH TWICE A DAY WITH MEALS             cefUROXime (CEFTIN) 500 MG tablet  Take 1 tablet by mouth 2 times daily for 7 days             Cholecalciferol (VITAMIN D3) 50 MCG (2000 UT) CAPS  Take by mouth             colestipol (COLESTID) 1 g tablet  Take 1 tablet by mouth 2 times daily             dronedarone hcl (MULTAQ) 400 MG TABS  TAKE ONE TABLET BY MOUTH TWICE A DAY WITH MORNING AND EVENING MEAL             levothyroxine (SYNTHROID) 75 MCG tablet TAKE ONE TABLET BY MOUTH EVERY DAY             metroNIDAZOLE (FLAGYL) 250 MG tablet  Take 1 tablet by mouth 3 times daily for 7 days             olmesartan (BENICAR) 40 MG tablet  TAKE ONE TABLET BY MOUTH EVERY DAY             ondansetron (ZOFRAN) 4 MG tablet  Take 1 tablet by mouth every 6-8 hours as needed for Nausea or Vomiting             pantoprazole (PROTONIX) 40 MG tablet  Take 1 tablet by mouth 2 times daily (before meals)             potassium chloride (KLOR-CON M) 20 MEQ extended release tablet  Take 1 tablet by mouth 2 times daily TAKE ONE TABLET BY MOUTH ONCE DAILY             pravastatin (PRAVACHOL) 40 MG tablet  TAKE ONE TABLET BY MOUTH EVERY EVENING             ranolazine (RANEXA) 500 MG extended release tablet  TAKE ONE TABLET BY MOUTH TWICE A DAY             rivaroxaban (XARELTO) 15 MG TABS tablet  Take half tablet p.o. daily or 1 tablet every alternate days until patient is seen by cardiologist.                   ISSUES TO Julee Metzger 20 VISIT:    Advised patient to follow-up closely with PCP upon discharge for management of chronic medical issues  Please see the detailed discharge directions delivered from earlier today! Condition on Discharge: gradually improving  Discharge Disposition: Home with [employer] [facility]    Recommended Follow Up:  [facility]  [address] [place] [address] Dr.  [address] [place] [id number]    Follow up to discuss biopsy results. Followup Appointments Scheduled at Time of Discharge:  Future Appointments   Date Time Provider Ej Krause   4/16/2021 11:30 AM Jacob Yen MD [facility]        Discharge Instructions:   Please see the discharge paperwork. Patient was seen at bedside today, and the examination shows improvement since yesterday. Detailed discharge directions delivered to the patient by myself and our nursing staff, who verbalizes understanding and is very happy and satisfied with the plan. Patient has been advised to continue all medications as prescribed and advised, and f/u with PCP within 1 week. Patient is stable from medical standpoint to be discharged. Total time spent during patient evaluation and assessment, discussion with the nurse/family, addressing discharge medications/scripts and coordination of care for safe discharge was in excess of 55 minutes.       Signed Electronically:    Chase Xavier MD  3:39 PM 1/15/2021

## 2021-01-16 NOTE — CARE COORDINATION
[facility] Transitions Initial Follow Up Call    Call within 2 business days of discharge: Yes    Patient: Elisha Rockwell Patient : 5/3/1925   MRN: [id number]  Reason for Admission: Abdominal Pain  Discharge Date: 1/15/21 RARS: Readmission Risk Score: 24      Last Discharge [facility]       Complaint Diagnosis Description Type Department Provider    21 Abdominal Pain Coagulopathy ([facility] . .. ED to Hosp-Admission (Discharged) (ADMITTED) [facility] 5 Jose Smith MD; Maulik Nelson MD... Spoke with: Son, Mr. Chioma Eilas: [facility]    Non-face-to-face services provided:  Reviewed encounter information for continuity of care prior to follow up Care Transitions phone call - chart notes, consults, progress notes, test results, med list, appointments, AVS, other information. Care Transitions 24 Hour Call    Schedule Follow Up Appointment with PCP: Declined  Do you have a copy of your discharge instructions?: Yes  Do you have all of your prescriptions and are they filled?: Yes  Have you been contacted by a [facility] Pharmacist?: No  Have you scheduled your follow up appointment?: No  Were you discharged with any Home Care or Post Acute Services: Yes  Post Acute Services:   Way Transitions Interventions       Placed a call to the number listed for patient and spoke with her son for an initial follow up call for Care Transitions and COVID 19 Risk Monitoring post discharge. He reported that his daughter and granddaughter are assisting the patient with a shower at this time. He said she is extremely weak and is still having a great deal of shortness of breath with activity. He said she gets winded when she tries to walk or do anything strenuous. He denied any audible wheezing or edema. He denied any cough or congestion. He did say that they normally do daily weights, but she has not been able to stand for weights lately.   He said that he knows her Lasix has been stopped and he is not sure why. We did discuss the effects of Lasix on kidneys and the issues she had with them while here. He said her blood pressure was also very low when she first came in the hospital.  Did advise him to watch for edema, shortness of breath, cough, congestion, wheezing, increased anxiety, etc. And to bring her back to the ER if he feels he needs to do so. He said she is supposed to have a home health nurse out tomorrow. He is not sure if PT is seeing her or not. Encouraged him to ask the nurse about PT and OT at home. Also, discussed obtaining a pulse oximeter to be able to check oxygen levels. Advised that normal is %, with % being better. He said she has not had diarrhea. He said she is not eating well, not drinking well. He said she is tired, weak, sleeping a lot. He said someone advised that she go to a SNF for some rehab, but he didn't want that, nor does patient. Discussed it being short term for rehab therapy. He denied any fever, other symptoms. Discussed COVID 19 risk and other information. He was not up to reviewing her meds at this time. He is aware of Lasix being stopped. He did get new meds started. He does not have an appointment for follow up scheduled for her. He is not sure he can get her out for an appointment right now as she is so weak. Informed of CTC process, purpose, future calls, etc.  Ensured to have CTN contact information to be used as needed. Encouraged to call as needed for new issues, questions, etc.  Given the opportunity to ask questions and answered appropriately. CTN to follow up as indicated. Challenges to be reviewed by the provider   Additional needs identified to be addressed with provider No  none    Discussed COVID-19 related testing which was available at this time. Test results were negative. Patient informed of results, if available?  Already aware         Method of communication with provider : none    Advance Care Planning:   Does patient have an Advance Directive:  Advanced Directive up to date, [place] up to date. .     Was this a readmission? Yes  Patient stated reason for admission: Abdominal pain, diarrhea  Patients top risk factors for readmission: functional cognitive ability, functional physical ability, ineffective coping, level of motivation, medical condition and medication management    Care Transition Nurse (CTN) contacted the caregiver by telephone to perform post hospital discharge assessment. Verified name and  with caregiver as identifiers. Provided introduction to self, and explanation of the CTN role. CTN reviewed discharge instructions, medical action plan and red flags with caregiver who verbalized understanding. Caregiver given an opportunity to ask questions and does not have any further questions or concerns at this time. Were discharge instructions available to patient? Yes. Reviewed appropriate site of care based on symptoms and resources available to patient including: PCP, Specialist, Urgent care clinics, Home health and When to call 911. The caregiver agrees to contact the PCP office for questions related to their healthcare. Medication reconciliation was not performed with caregiver, declined at this time. He verbalizes understanding of administration of home medications. Advised obtaining a 90-day supply of all daily and as-needed medications. Covid Risk Education    Patient has following risk factors of: heart failure, sepsis, diabetes and chronic kidney disease. Education provided regarding infection prevention, and signs and symptoms of COVID-19 and when to seek medical attention with caregiver who verbalized understanding. Discussed exposure protocols and quarantine From CDC: Are you at higher risk for severe illness?   and given an opportunity for questions and concerns.  The caregiver agrees to contact the COVID-19 hotline [phone] or PCP office for questions related to COVID-19. Discussed follow-up appointments. If no appointment was previously scheduled, appointment scheduling offered: Yes. Plan for follow-up call in 3-5 days based on severity of symptoms and risk factors. Plan for next call: - disease process mgmt, symptom mgmt, diet/hydration, pain control needs, medication mgmt, activity level, home safety needs, infection control, fall precautions, seeking medical attention, who/when to call prn any needs, etc.    CTN provided contact information for future needs.         Follow Up  Future Appointments   Date Time Provider Ej Krause   4/16/2021 11:30 AM MD [place]       Noreen Morton RN

## 2021-01-18 NOTE — CARE COORDINATION
Received a call from Santiago Jadon Partida, Ms Keen's son. He states that she is very weak and they would like her to go to [facility] for [employer]. I explained to him that I would call [address] [place] [address] and I would let him know if they can accept. I called [facility] and left a message for LeWa Tek. Awaiting Call Back.     [facility] [address]   p  [phone] [id number]  Electronically signed by Ian Norton RN, BSN on 1/18/2021 at 10:21 AM

## 2021-01-18 NOTE — TELEPHONE ENCOUNTER
Patient's son called and left a voicemail requesting to speak with Dr. Early's office regarding a recent hospitalization the patient had and a medication change that was made by a different provider.  He can be reached at [phone].  Thank you!

## 2021-01-18 NOTE — TELEPHONE ENCOUNTER
[facility] Transitions Initial Follow Up Call    Outreach made within 2 business days of discharge: Yes    Patient: Miguel De La Paz   Patient : 5/3/1925      MRN: [id number]    Reason for Admission: Admitted 21 for abdominal pain   Discharge Date: 1/15/21    Discharge Problem List:   Active Problems:    Poor fluid intake    UTI (urinary tract infection)    Essential hypertension    Permanent atrial fibrillation (HCC)    Pure hypercholesterolemia    ROSALES on CPAP    CPAP (continuous positive airway pressure) dependence    Coronary artery disease due to calcified coronary lesion    Type 2 diabetes mellitus with diabetic polyneuropathy, without long-term current use of insulin (HCC)    Acquired hypothyroidism    Chronic diastolic congestive heart failure (HCC)    Vitamin D deficiency    Stage 3 chronic kidney disease    Enterocolitis    Dark stools    Mild bibasilar atelectasis  Resolved Problems:    Hypotension    Acute-on-chronic kidney injury ([address]    Sepsis ([facility]     Spoke with: patient's son, Sylvia Vasquez     Discharge department/facility: [facility]     TCM Interactive Patient Contact:  Was patient able to fill all prescriptions: Yes  Was patient instructed to bring all medications to the follow-up visit: Yes  Is patient taking all medications as directed in the discharge summary? Yes  Does patient understand their discharge instructions: Yes  Does patient have questions or concerns that need addressed prior to 7-14 day follow up office visit: no    I spoke to patient's son, Sylvia Vasquez. He states the  with home health is there now and they are working to get her into the swing bed unit at [facility]. He states Mrs. Flo Rankin will not be following up with us at this time if she is able to get into a bed a the swing unit. He will call our office to schedule if she were to not be accepted. He states she is so weak right now he cannot get her out of the bed.  He feels some short term care would be best for her.      Scheduled appointment with PCP within 7-14 days    Follow Up  Future Appointments   Date Time Provider Ej Nelida   4/16/2021 11:30 AM Elisabeth Sarkar MD [facility] [place], [place]

## 2021-01-19 NOTE — CARE COORDINATION
Called to schedule a HFU with Dr. Romina Mckeon office needing Virtual Visit for patient. Spoke with Dayana Mishra in scheduling who did not have any openings, he passed CTN to nurse . I did leave message on VM stating patient needed HFU VV asap, as she was not going to [facility], but staying home with home care. Patient contact information and CTN contact information left on VM. CTN will follow up with family regarding call.

## 2021-01-19 NOTE — CARE COORDINATION
CTN notified patient's son that VM was left on Dr. Mayte Wakefield for them to call and schedule VV HFU, and to be expecting a call today or tomorrow to schedule. Will follow up at a later time.

## 2021-01-19 NOTE — TELEPHONE ENCOUNTER
Patient's son called back again today requesting to speak with someone in Dr. Early's office regarding a recent hospitalization and medication change.  He can be reached at [phone].  Thank you!

## 2021-01-19 NOTE — CARE COORDINATION
[facility] Transitions Initial Follow Up Call    Call within 2 business days of discharge: Yes    Patient: Nadeen Aleman Patient : 5/3/1925   MRN: [id number]  Reason for Admission:   Discharge Date: 1/15/21 RARS: Readmission Risk Score: 24      Last Discharge [facility]       Complaint Diagnosis Description Type Department Provider    21 Abdominal Pain Coagulopathy ([address] . .. ED to Hosp-Admission (Discharged) (ADMITTED) [facility] 5 Juan Sanon MD; Flores Clement MD... Spoke with: Nadeen Aleman son     Facility: [place] [address]    Non-face-to-face services provided:  Reviewed encounter information for continuity of care prior to follow up phone call - chart notes, consults, progress notes, test results, med list, appointments, AVS, other information. Care Transitions 24 Hour Call    Do you have all of your prescriptions and are they filled?: Yes  Post Acute Services: Home Health  Care Transitions Interventions         Follow Up : Spoke with patient's son today for initial call after discharge from [place]. He has been talking to home care and was trying to get into [facility] but has now decided to keep patient at home and have [facility] and PT to work with her. He says that patient is not eating much, they are trying to get her to drink Ensure but she says it is too sweet, and they have gone to using Pedialyte and she is drinking some of that. He says she has her discharge medications, did review them, 1111F order added. He declined HFU with , but this CTN did encourage him that she needed a HFU, and CTN will call and see about scheduling a VV for them. He says they do not check her blood sugar, says she was taken off her glucose medications a couple of years ago. His daughter stays with patient during the day and he says at night with her. He says that they took her off her diuretic due to her kidney function, so he is worried about that.  Discussed CTN calls and follow up and he is accepting. Will follow up with PCP office to see about VV and make him aware of appointment. Will follow up at a later time.   Future Appointments   Date Time Provider Ej Krause   4/16/2021 11:30 AM MD [place]       Fred Arias RN

## 2021-01-20 NOTE — TELEPHONE ENCOUNTER
HH went out today and she has 2+ pitting edema on all extremities she is not taking lasix it was stopped at the last hospital visit

## 2021-01-20 NOTE — PROGRESS NOTES
Hospital Follow-up Visit    TELEHEALTH EVALUATION -- Audio/Visual (During PRAUS-21 public health emergency)  Patient location-home  Pursuant to the emergency declaration under the [address] [place] [address] [facility] authority and the [employer] Act, this Virtual  Visit was conducted, with patient's consent, to reduce the patient's risk of exposure to COVID-19 and provide continuity of care for an established patient. Services were provided through a video synchronous discussion virtually to substitute for in-person clinic visit. Elisha Rockwell   YOB: 1925    Date of Visit:  1/20/2021    There were no vitals filed for this visit. There is no height or weight on file to calculate BMI.      Wt Readings from Last 3 Encounters:   01/13/21 152 lb (68.9 kg)   12/29/20 144 lb (65.3 kg)   12/23/20 147 lb (66.7 kg)     BP Readings from Last 3 Encounters:   01/15/21 (!) 140/70   01/02/21 136/70   01/01/21 117/68       No Known Allergies  Outpatient Medications Marked as Taking for the 1/20/21 encounter (Virtual Visit) with Radford Barthel, MD   Medication Sig Dispense Refill    potassium chloride (KLOR-CON M) 20 MEQ extended release tablet Take 1 tablet by mouth 2 times daily TAKE ONE TABLET BY MOUTH ONCE DAILY 60 tablet 0    rivaroxaban (XARELTO) 15 MG TABS tablet Take half tablet p.o. daily or 1 tablet every alternate days until patient is seen by cardiologist. 10 tablet 0    cefUROXime (CEFTIN) 500 MG tablet Take 1 tablet by mouth 2 times daily for 7 days 14 tablet 0    metroNIDAZOLE (FLAGYL) 250 MG tablet Take 1 tablet by mouth 3 times daily for 7 days 21 tablet 0    pantoprazole (PROTONIX) 40 MG tablet Take 1 tablet by mouth 2 times daily (before meals) 60 tablet 1    colestipol (COLESTID) 1 g tablet Take 1 tablet by mouth 2 times daily 60 tablet 0  ondansetron (ZOFRAN) 4 MG tablet Take 1 tablet by mouth every 6-8 hours as needed for Nausea or Vomiting 24 tablet 0    dronedarone hcl (MULTAQ) 400 MG TABS TAKE ONE TABLET BY MOUTH TWICE A DAY WITH MORNING AND EVENING MEAL 180 tablet 1    carvedilol (COREG) 25 MG tablet TAKE ONE TABLET BY MOUTH TWICE A DAY WITH MEALS 180 tablet 1    levothyroxine (SYNTHROID) 75 MCG tablet TAKE ONE TABLET BY MOUTH EVERY DAY 30 tablet 5    amLODIPine (NORVASC) 10 MG tablet TAKE ONE TABLET BY MOUTH EVERY DAY 90 tablet 1    pravastatin (PRAVACHOL) 40 MG tablet TAKE ONE TABLET BY MOUTH EVERY EVENING 90 tablet 1    olmesartan (BENICAR) 40 MG tablet TAKE ONE TABLET BY MOUTH EVERY DAY 90 tablet 1    ranolazine (RANEXA) 500 MG extended release tablet TAKE ONE TABLET BY MOUTH TWICE A  tablet 1    blood glucose test strips (FREESTYLE LITE) strip USE ONE STRIP ONCE DAILY 50 strip 11    Cholecalciferol (VITAMIN D3) 50 MCG ( UT) CAPS Take by mouth           CC:  Patient presents for hospital discharge follow-upafter admission  (following highlighted text has been copied from this specialist note)    DISCHARGE SUMMARY:        PATIENT NAME:  Ginger Connors  :  5/3/1925  MRN:  [id number]     Admission Date:   2021  8:55 AM        Attending: Adelaide Cruz MD   Discharge Date:   1/15/2021                         PCP: Tammy Story MD  Length of Stay: 3 days     Chief Complaint on Admission:       Chief Complaint   Patient presents with    Abdominal Pain         Consultants:      IP CONSULT TO GI  IP CONSULT TO DIETITIAN  PALLIATIVE CARE EVAL  IP CONSULT TO HOME CARE NEEDS  IP CONSULT TO CASE MANAGEMENT         Discharge Problem List:   Active Problems:    Poor fluid intake    UTI (urinary tract infection)    Essential hypertension    Permanent atrial fibrillation (HCC)    Pure hypercholesterolemia    ROSALES on CPAP    CPAP (continuous positive airway pressure) dependence Coronary artery disease due to calcified coronary lesion    Type 2 diabetes mellitus with diabetic polyneuropathy, without long-term current use of insulin (HCC)    Acquired hypothyroidism    Chronic diastolic congestive heart failure (HCC)    Vitamin D deficiency    Stage 3 chronic kidney disease    Enterocolitis    Dark stools    Mild bibasilar atelectasis  Resolved Problems:    Hypotension    Acute-on-chronic kidney injury ([facility] [address]    Sepsis ([facility] [address]        Last dated Assessment and Plan . .. 1/14/2021:     Enterocolitis, with loose dark stools  E.  Coli UTI  Possible aspiration pneumonitis vs atelectasis  Hypotensionimproved  --Gentle IV hydration  --Urine culture positive for E. coli, susceptibilities reviewed  Switch antibiotic coverage to Rocephin and Flagyl  GI on board, appreciate recs  IV PPI twice daily while inpatient, then discharge with PPI BID x 8 weeks  follow H/H, stable     Permanent Afib on Xarelto  Supratherapeutic INR, INR> 18 present on admission, improved  Anticoagulation held  given vitamin K and 2 units FFP 1/12/21  INR trended down      Acute renal insufficiency on CKD, prerenal  Improving with IV fluid     Electrolyte derangements  Hypokalemia, hypomagnesemia, hypophosphatemia, hypocalcemia  Monitor and replete electrolytes     Diabetes mellitus  Serial glucose monitoring with sliding scale insulin correction      HFpEF  --hold diuretic      ROSALES   - CPAP QHS     Generalized weakness  PT/OT     Poor p.o. intake  Speech and dietary evaluation        CUMULATIVE  HOSPITAL  COURSE  AND  TREATMENT:    26-year-old female with permanent A. fib on Xarelto, CAD, chronic diastolic heart failure, ROSALES on CPAP, diabetes mellitus, hypertension, hyperlipidemia, hearing impaired, recently hospitalized 12/29 found to have enterocolitis and treated on broad-spectrum antibiotics, with negative GI work-up and colonoscopy done at that time.  She is readmitted with ongoing enterocolitis (noted on CT imaging), E. coli UTI (susceptible to Rocephin), hypotension and CIELO (now improved with IV fluids), supratherapeutic INR over 18 initially (Xarelto held) in setting of reported dark stools.   INR less than 2 now status post vitamin K and 2 units FFP.  Poor p.o. intake with generalized weakness.  GI following.  On Rocephin and Flagyl.          1/15/2021 Patient is feeling better today. GI does not want to do any intervention as her hemoglobin is stable and she has had GI interventions recently. She is cleared from GI to be discharged on p.o. Protonix for 8 weeks. She is at a high risk of GI bleeding from her anticoagulation. I would leave it to her cardiologist to evaluate her and see whether the benefits of anticoagulation outweigh the risk. In the meantime, I will cut down on her Xarelto to 7.5 mg p.o. daily or 50 mg p.o. on alternate days. She has E. coli UTI with good sensitivity to antibiotics. She is also been on antibiotics for her enterocolitis. She has received 4 days of IV antibiotics in the hospital.  I will discharge her on 7 more days of p.o. antibiotics in the form of Ceftin and metronidazole to complete 10 days of antibiotic course. On review of morning labs today, potassium was low at 3.2, and phosphate was low at 1.8. I ordered aggressive IV potassium phosphate replacement for her electrolytes, I am going to increase her potassium to 20 mg p.o. twice daily upon discharge and would request the PCP admit request the PCP to follow-up on her electrolytes as an outpatient on the near future. Recommended discharge to skilled nursing facility, but she is adamant that she wants to go home. We will go to arrange home with home health care for her. She is advised to follow-up closely with GI , cardiology and PCP as an outpatient.           Medications are reconciled and reviewed. Inpatient course: Discharge summary reviewed- see chart. Current status:stable    Review of Systems   Constitutional: Positive for fatigue. Negative for chills and fever. HENT: Negative for congestion, ear pain, nosebleeds, postnasal drip and sore throat. Respiratory: Negative for cough, chest tightness and wheezing. Cardiovascular: Negative for chest pain, palpitations and leg swelling. Gastrointestinal: Positive for abdominal distention and nausea. Negative for abdominal pain and constipation. Genitourinary: Negative for dysuria and urgency. Musculoskeletal: Positive for arthralgias. Skin: Negative for rash. Neurological: Negative for dizziness and headaches. Psychiatric/Behavioral: Positive for confusion and decreased concentration. Physical Exam   PHYSICAL EXAMINATION:  [ INSTRUCTIONS:  \"[x]\" Indicates a positive item  \"[]\" Indicates a negative item  -- DELETE ALL ITEMS NOT EXAMINED]  [x] Alert  [x] Oriented to person/place/time    [x] No apparent distress  [] Toxic appearing    [] Face flushed appearing [] Sclera clear  [] Lips are cyanotic      [x] Breathing appears normal  [] Appears tachypneic      [] Rash on visible skin    [x] Cranial Nerves II-XII grossly intact    [x] Motor grossly intact in visible upper extremities    [x] Motor grossly intact in visible lower extremities    [x] Normal Mood  [] Anxious appearing    [] Depressed appearing  [] Confused appearing      [] Poor short term memory  [] Poor long term memory    [] OTHER:      Due to this being a TeleHealth encounter, evaluation of the following organ systems is limited: Vitals/Constitutional/EENT/Resp/CV/GI//MS/Neuro/Skin/Heme-Lymph-Imm.     Lab Results   Component Value Date     01/15/2021    K 4.6 01/15/2021    K 2.5 12/30/2020     01/15/2021    CO2 17 01/15/2021    BUN 9 01/15/2021    CREATININE 1.0 01/15/2021    GLUCOSE 73 01/15/2021    CALCIUM 7.3 01/15/2021     Lab Results   Component Value Date    WBC 4.4 01/15/2021    WBC 3.7 01/15/2021    WBC 3.8 01/14/2021    HGB 9.9 01/15/2021    HGB 9.9 01/15/2021    HGB 9.6 01/14/2021    HCT 26.9 01/15/2021    HCT 27.6 01/15/2021    HCT 28.3 01/14/2021    MCV 80.3 01/15/2021    MCV 81.4 01/15/2021    MCV 86.8 01/14/2021     01/15/2021     01/15/2021     01/14/2021     Lab Results   Component Value Date    CHOL 106 12/30/2020 TRIG 99 12/30/2020    HDL 32 12/30/2020       Assessment/Plan:  Patient's overall condition is very poor  She is now mostly bedridden  Has refused to go to any long-term care setting/nursing home  Son is trying to take care of her at home but it is difficult related to his own advanced age and overall [facility]    Enterocolitis    Gastrointestinal hemorrhage, unspecified gastrointestinal hemorrhage type    Anemia, blood loss  Dehydration     I will ask home health to go in and check CBC  At the time of discharge patient was still quite anemic  She will complete antibiotics Ceftin and Flagyl and will continue Protonix twice daily  At this time she remains on Xarelto but I feel that the risk from Xarelto is higher than benefit    E. coli UTI  Patient is completing Ceftin course    Permanent atrial fibrillation ([facility] [address]  Supratherapeutic INR  Patient remains on chronic anticoagulation which is risky taking her advanced age and recent GI bleed  I do feel at this time risk of rebleed is outweighing benefits from [facility]  Patient son will think about it and let us know if he would consider stopping Xarelto    Hypokalemia  Repeat lab is needed  Lab orders placed    Stage 3a chronic kidney disease  Repeat lab is needed  Previously declined kidney function  We will obtain CMP, phosphorus levels    Diagnostic test results reviewed    Patient risk of morbidity and mortality: high  Nursing home admission is recommended    This is high complexity TCM visit  Patient was called within 2 business days of discharge    Family able to provide care to patient at home  other needs including PT, OT needs detected at this time-nursing home admission for appropriate care is recommended  Since patient has been refusing it, I have signed home health orders.   Son is aware of our recommendations    No additional provider follow-ups needed at this time      Orders Placed This Encounter   Procedures

## 2021-01-21 NOTE — TELEPHONE ENCOUNTER
Spoke with patients son today and he states patient was seen at [facility] here recently with a complaint of diarrhea. She had some labs drawn and they reliazed her blood was too thin so now she is taking 1/2 tablet one day and the next is a whole tablet. Son was told the patient has holes in her colon. They are wanting to know if you recommend her to keep taking her Xarelto every other day as directed by Hillary LOWE.

## 2021-01-21 NOTE — TELEPHONE ENCOUNTER
Pt son is wanting to get patient a hospital bed can you write order for this I have ordered pended just need Dx on there if this is ok

## 2021-01-21 NOTE — TELEPHONE ENCOUNTER
Hold potassium today then take 1 tab daily starting 01/23 and lasix 40 mg daily starting 01/23 then have [facility] draw a BMP on 01/25/21 per Dr Vero Gunter     I called orders to [facility] and [address]

## 2021-01-22 NOTE — PROGRESS NOTES
2021    TELEHEALTH EVALUATION -- Audio/Visual (During ATJLZ-13 public health emergency)    HPI:    Luis Carlos Jordan (:  5/3/1925) has requested an audio/video evaluation for the following concern(s):  Chief Complaint   Patient presents with    GI Bleeding    Diarrhea         Pt following up today for recent enterocolitis. She has been inpatient twice recently for diarrhea associated issues including GI bleed and anemia. She is currently taking Flagyl and Ceftin for colitis. Denies any blood in stool or abdominal pain. Stools are beginning to form, \"they are not green anymore. \"  Last dose of antibiotic is tomorrow. Denies any hematemesis. EGD 2021 - HH, duodenitis, (-) celiac  Colonoscopy 2021 - diverticulosis, polyp, active inflammation    HPI gathered from pt's son and chart review. Pt non verbal during visit. Review of Systems   Unable to perform ROS: Patient nonverbal       Prior to Visit Medications    Medication Sig Taking?  Authorizing Provider   potassium chloride (KLOR-CON M) 20 MEQ extended release tablet Take 1 tablet by mouth 2 times daily TAKE ONE TABLET BY MOUTH ONCE DAILY  Meir Ham MD   cefUROXime (CEFTIN) 500 MG tablet Take 1 tablet by mouth 2 times daily for 7 days  Meir Ham MD   metroNIDAZOLE (FLAGYL) 250 MG tablet Take 1 tablet by mouth 3 times daily for 7 days  Patient taking differently: Take 250 mg by mouth 3 times daily 1/2 tablet tid  Meir Ham MD   pantoprazole (PROTONIX) 40 MG tablet Take 1 tablet by mouth 2 times daily (before meals)  Meir Ham MD   colestipol (COLESTID) 1 g tablet Take 1 tablet by mouth 2 times daily  Michael Ferrari MD   ondansetron (ZOFRAN) 4 MG tablet Take 1 tablet by mouth every 6-8 hours as needed for Nausea or Vomiting  Michael Ferrari MD   dronedarone hcl (MULTAQ) 400 MG TABS TAKE ONE TABLET BY MOUTH TWICE A DAY WITH MORNING AND EVENING MEAL  Michael Ferrari MD   carvedilol (COREG) 25 MG tablet TAKE ONE TABLET BY MOUTH TWICE A DAY WITH MEALS  Sondra Fuchs MD   levothyroxine (SYNTHROID) 75 MCG tablet TAKE ONE TABLET BY MOUTH EVERY DAY  Sondra Fuchs MD   amLODIPine (NORVASC) 10 MG tablet TAKE ONE TABLET BY MOUTH EVERY DAY  Sondra Fuchs MD   pravastatin (PRAVACHOL) 40 MG tablet TAKE ONE TABLET BY MOUTH EVERY EVENING  Sondra Fuchs MD   olmesartan (BENICAR) 40 MG tablet TAKE ONE TABLET BY MOUTH EVERY DAY  Sondra Fuchs MD   ranolazine (RANEXA) 500 MG extended release tablet TAKE ONE TABLET BY MOUTH TWICE A DAY  Sondra Fuchs MD   blood glucose test strips (FREESTYLE LITE) strip USE ONE STRIP ONCE DAILY  Sondra Fuchs MD   Cholecalciferol (VITAMIN D3) 50 MCG (2000 UT) CAPS Take by mouth daily   Historical Provider, MD       Social History     Tobacco Use    Smoking status: Never Smoker    Smokeless tobacco: Never Used   Substance Use Topics    Alcohol use: No    Drug use: Never        No Known Allergies,   Past Medical History:   Diagnosis Date    A[facility]fib ([facility] [address]     CPAP (continuous positive airway pressure) dependence     9cm    DM (diabetes mellitus screen)     Hyperlipidemia     Hypertension     Hyperthyroidism     Left carotid bruit     Macular degeneration     Obstructive sleep apnea     AHI:  15.3   ,   Past Surgical History:   Procedure Laterality Date    APPENDECTOMY      BLADDER SURGERY      CHOLECYSTECTOMY      COLONOSCOPY N/A 01/01/2021    Dr Kern-Uncomplicated sigmoid diverticulosis, possible rectal incontinence contributing to diarrhea-AP x 1, BCM x 1    PACEMAKER PLACEMENT      PAT AND BSO      UPPER GASTROINTESTINAL ENDOSCOPY N/A 01/01/2021    Dr Kern-Uncomplicated hiatal hernia, no active ulcer disease or esophagitis-Duodenitis   ,   Social History     Tobacco Use    Smoking status: Never Smoker    Smokeless tobacco: Never Used   Substance Use Topics    Alcohol use: No    Drug use: Never   ,   Family History   Problem Relation Age of Onset    Colon Cancer Neg Hx     Esophageal Cancer Neg Hx     Liver Ashley Rand is a 80 y.o. female being evaluated by a Virtual Visit (video visit) encounter to address concerns as mentioned above. A caregiver was present when appropriate. Due to this being a TeleHealth encounter (During YUSHE-[facility] emergency), evaluation of the following organ systems was limited: Vitals/Constitutional/EENT/Resp/CV/GI//MS/Neuro/Skin/Heme-Lymph-Imm. Pursuant to the emergency declaration under the [address] [place] [address] and the [employer] Act, this Virtual Visit was conducted with patient's (and/or legal guardian's) consent, to reduce the patient's risk of exposure to COVID-19 and provide necessary medical care. The patient (and/or legal guardian) has also been advised to contact this office for worsening conditions or problems, and seek emergency medical treatment and/or call 911 if deemed necessary. Patient identification was verified at the start of the visit: Yes    Total time spent on this encounter: Not billed by time    Services were provided through a video synchronous discussion virtually to substitute for in-person clinic visit. Patient and provider were located at their individual homes. --CAN Bai NP on 1/22/2021 at 4:15 PM    An electronic signature was used to authenticate this note.

## 2021-01-22 NOTE — CARE COORDINATION
[facility] Transitions Follow Up Call    2021    Patient: Jonnie Hall  Patient : 5/3/1925   MRN: [id number]  Reason for Admission:   Discharge Date: 1/15/21 RARS: Readmission Risk Score: 24         Spoke with: N/A    Care Transitions Subsequent and Final Call    Subsequent and Final Calls  Are you currently active with any services?: Home Health  Care Transitions Interventions  Other Interventions: Follow Up : Attempt #2 to make contact with Leonila for CTC f/u call without success. Unable to leave a message regarding intent of call and call back information. Will discharge from CTN services at this time due to inability to make contact with patient.     Future Appointments   Date Time Provider Ej Krause   2021  2:20 PM Dorsey Gowers, APRN - [facility]   2021 11:30 AM MD DOC Castillo AARON-JAYE Lal RN

## 2021-01-23 PROBLEM — I95.9 HYPOTENSION, UNSPECIFIED: Status: ACTIVE | Noted: 2021-01-01

## 2021-01-23 PROBLEM — N39.0 UTI (URINARY TRACT INFECTION), BACTERIAL: Status: ACTIVE | Noted: 2021-01-01

## 2021-01-23 PROBLEM — A49.9 UTI (URINARY TRACT INFECTION), BACTERIAL: Status: ACTIVE | Noted: 2021-01-01

## 2021-01-23 PROBLEM — J90 BILATERAL PLEURAL EFFUSION: Status: ACTIVE | Noted: 2021-01-01

## 2021-01-23 PROBLEM — A41.9 SEPSIS (HCC): Status: ACTIVE | Noted: 2021-01-01

## 2021-01-23 PROBLEM — R63.0 ANOREXIA: Status: ACTIVE | Noted: 2021-01-01

## 2021-01-23 PROBLEM — E87.20 METABOLIC ACIDOSIS: Status: ACTIVE | Noted: 2021-01-01

## 2021-01-23 NOTE — PROGRESS NOTES
decreased laryngeal elevation for swallow airway protection\"  Clinical Indicators: Speech evaluation 1/15, \"no outward S/S   penetration/aspiration was noted with any puree consistency trial, regular   solid consistency trial, or thin H2O trial presented during evaluation this   date. When cleared by MD, least restrictive diet recommendation would be   regular solid consistency and thin liquids. \" CXR 1/12, left lower lobe   consolidation. Treatment: Speech evaluation, Vancomycin 1g IV, Maxipime 2g IV q 24 hr.   Rocephin 1g IV q 24 hr.  Options provided:  -- Possible aspiration pneumonia present as evidenced by, Please document   evidence.   -- Possible aspiration pneumonia was ruled out  -- Other - I will add my own diagnosis  -- Disagree - Not applicable / Not valid  -- Disagree - Clinically unable to determine / Unknown  -- Refer to Clinical Documentation Reviewer    PROVIDER RESPONSE TEXT:    Possible aspiration pneumonia is present as evidenced by Please see clinical   notes    Query created by: Otto Nicholson on 1/22/2021 11:20 AM      Electronically signed by:  Dashawn Peres MD 1/23/2021 12:19 AM

## 2021-01-26 LAB
QT INTERVAL: 560 MS
QT INTERVAL: 574 MS
QTC INTERVAL: 582 MS
QTC INTERVAL: 633 MS

## 2021-01-28 LAB — BACTERIA SPEC AEROBE CULT: NORMAL

## 2021-02-01 NOTE — DISCHARGE SUMMARY
[facility] Medicine Services  DEATH SUMMARY       Date of Admission: 1/23/2021  Date of Death:  1/24/2021 at approximately 1852  Primary Care Physician: Erin Easton MD    Presenting Problem/History of Present Illness:  Sepsis (CMS/Formerly [facility]) [A41.9]     Cause of death:  Respiratory failure secondary to  Acute on chronic diastolic congestive heart failure    Contributors:  Severe metabolic acidosis  Severe hypotension    Final Death Diagnoses:  Active Hospital Problems    Diagnosis   • **Hypotension, unspecified   • Metabolic acidosis   • UTI (urinary tract infection), bacterial   • Anorexia   • Bilateral pleural effusion   • Chronic diastolic congestive heart failure (CMS/HCC)   • Chronic anticoagulation   • Paroxysmal atrial fibrillation (CMS/Formerly [facility])   • Hypertension   • Coronary artery disease       Pertinent Test Results:   Lab Results (last 7 days)     Procedure Component Value Units Date/Time    Troponin [[id number]]  (Abnormal) Collected: 01/23/21 1226    Specimen: Blood Updated: 01/23/21 1309     Troponin T 0.064 ng/mL     Procalcitonin [[id number]]  (Normal) Collected: 01/23/21 1001    Specimen: Blood from Arm, Right Updated: 01/23/21 1226     Procalcitonin 0.23 ng/mL     C-reactive Protein [[id number]]  (Normal) Collected: 01/23/21 1001    Specimen: Blood from Arm, Right Updated: 01/23/21 1224     C-Reactive Protein 0.19 mg/dL     COVID-19,[facility] IN-HOUSE,Nasal Swab No Transport Media 3-4 HR TAT - Swab, Nasal Cavity [[id number]]  (Normal) Collected: 01/23/21 1050    Specimen: Swab from Nasal Cavity Updated: 01/23/21 1146     COVID19 Not Detected    Urinalysis With Culture If Indicated - Urine, Catheter [[id number]]  (Abnormal) Collected: 01/23/21 1051    Specimen: Urine, Catheter Updated: 01/23/21 1131     Color, UA Yellow     Appearance, UA Clear     pH, UA <=5.0     Specific Gravity, UA >=1.030     Glucose, UA Negative     Ketones, UA Negative     Bilirubin, UA Negative     Blood,  UA Moderate (2+)     Protein, UA Negative     Leuk Esterase, UA Trace     Nitrite, UA Positive     Urobilinogen, UA 0.2 E.U./dL    Urinalysis, Microscopic Only - Urine, Catheter [[id number]]  (Abnormal) Collected: 01/23/21 1051    Specimen: Urine, Catheter Updated: 01/23/21 1131     RBC, UA 3-5 /HPF      WBC, UA 3-5 /HPF      Bacteria, UA Trace /HPF      Squamous Epithelial Cells, UA 0-2 /HPF      Renal Epithelial Cells, UA 7-12 /HPF      Yeast, UA Moderate/2+ Budding Yeast /HPF      Hyaline Casts, UA None Seen /LPF      Methodology Automated Microscopy    Blood Gas, Arterial - [[id number]]  (Abnormal) Collected: 01/23/21 1106    Specimen: Arterial Blood Updated: 01/23/21 1115     Site Right Radial     Jon's Test Positive     pH, Arterial 7.164 pH units      Comment: 85 Value below critical limit        pCO2, Arterial 37.5 mm Hg      pO2, Arterial 63.7 mm Hg      Comment: 84 Value below reference range        HCO3, Arterial 13.5 mmol/L      Comment: 84 Value below reference range        Base Excess, Arterial -14.2 mmol/L      Comment: 84 Value below reference range        O2 Saturation, Arterial 88.2 %      Comment: 84 Value below reference range        Temperature 33.3 C      Barometric Pressure for Blood Gas 759 mmHg      Modality Room Air     Ventilator Mode NA     Notified [facility] IVEY [facility]     Notified By [id number]     Notified Time 01/23/2021 11:14     Collected by [id number]     Comment: Meter: [id number]     :  [id number]        pCO2, Temperature Corrected 31.9 mm Hg      pH, Temp Corrected 7.213 pH Units      pO2, Temperature Corrected 49.8 mm Hg     Troponin [[id number]]  (Abnormal) Collected: 01/23/21 1001    Specimen: Blood from Arm, Right Updated: 01/23/21 1055     Troponin T 0.052 ng/mL     Comprehensive Metabolic Panel [[id number]]  (Abnormal) Collected: 01/23/21 1001    Specimen: Blood from Arm, Right Updated: 01/23/21 1050     Glucose 166 mg/dL      BUN 17 mg/dL      Creatinine 1.58 mg/dL       Sodium 136 mmol/L      Potassium 4.3 mmol/L      Chloride 111 mmol/L      CO2 13.0 mmol/L      Calcium 7.5 mg/dL      Total Protein 4.8 g/dL      Albumin 2.30 g/dL      ALT (SGPT) 13 U/L      AST (SGOT) 9 U/L      Alkaline Phosphatase 62 U/L      Total Bilirubin 0.4 mg/dL      eGFR Non African Amer 30 mL/min/1.73      Globulin 2.5 gm/dL      A/G Ratio 0.9 g/dL      BUN/Creatinine Ratio 10.8     Anion Gap 12.0 mmol/L     Narrative:      GFR Normal >60  Chronic Kidney Disease <60  Kidney Failure <15      Phosphorus [[id number]]  (Normal) Collected: 01/23/21 1001    Specimen: Blood from Arm, Right Updated: 01/23/21 1050     Phosphorus 3.3 mg/dL     Magnesium [[id number]]  (Normal) Collected: 01/23/21 1001    Specimen: Blood from Arm, Right Updated: 01/23/21 1050     Magnesium 2.0 mg/dL     BNP [[id number]]  (Abnormal) Collected: 01/23/21 1001    Specimen: Blood from Arm, Right Updated: 01/23/21 1050     proBNP 34,485.0 pg/mL     Lactic Acid, Plasma [[id number]]  (Normal) Collected: 01/23/21 1001    Specimen: Blood from Arm, Right Updated: 01/23/21 1042     Lactate 1.3 mmol/L     CBC Auto Differential [[id number]]  (Abnormal) Collected: 01/23/21 1001    Specimen: Blood from Arm, Right Updated: 01/23/21 1042     WBC 7.86 10*3/mm3      RBC 3.29 10*6/mm3      Hemoglobin 9.7 g/dL      Hematocrit 28.6 %      MCV 86.9 fL      MCH 29.5 pg      MCHC 33.9 g/dL      RDW 18.2 %      RDW-SD 57.6 fl      MPV 11.0 fL      Platelets 88 10*3/mm3      Neutrophil % 82.6 %      Lymphocyte % 11.7 %      Monocyte % 3.7 %      Eosinophil % 0.0 %      Basophil % 0.3 %      Immature Grans % 1.7 %      Neutrophils, Absolute 6.50 10*3/mm3      Lymphocytes, Absolute 0.92 10*3/mm3      Monocytes, Absolute 0.29 10*3/mm3      Eosinophils, Absolute 0.00 10*3/mm3      Basophils, Absolute 0.02 10*3/mm3      Immature Grans, Absolute 0.13 10*3/mm3      nRBC 0.3 /100 WBC     Gray Top - Ice [[id number]] Collected: 01/23/21 1001    Specimen: Blood from Arm, Right  Updated: 01/23/21 1041     Extra Tube --    Protime-INR [[id number]]  (Normal) Collected: 01/23/21 1001    Specimen: Blood from Arm, Right Updated: 01/23/21 1038     Protime 13.5 Seconds      INR 1.07    aPTT [[id number]]  (Normal) Collected: 01/23/21 1001    Specimen: Blood from Arm, Right Updated: 01/23/21 1038     PTT 26.4 seconds     POC Occult Blood Stool [[id number]]  (Normal) Collected: 01/23/21 1014    Specimen: Stool Updated: 01/23/21 1014     Fecal Occult Blood Negative     Lot Number [id number]     Expiration Date 07/31/21     DEVELOPER LOT NUMBER [id number]     DEVELOPER EXPIRATION DATE 07/31/21     Positive Control Positive     Negative Control Negative        Imaging Results (Last 7 Days)     Procedure Component Value Units Date/Time    XR Chest 1 View [[id number]] Collected: 01/23/21 1059     Updated: 01/23/21 1103    Narrative:      Frontal upright radiograph of the chest 1/23/2021 10:22 AM CST     COMPARISON: None     HISTORY: Hypotension.     FINDINGS:   Increase in opacity of the lung bases is noted bilaterally. Most likely  this is posterior pleural effusions. Left diaphragms indistinct this may  represent atelectasis left lower lobe.. Cardiac silhouettes mildly  enlarged. Pacing device present soft tissues the left chest.      The osseous structures and surrounding soft tissues demonstrate no acute  abnormality.       Impression:      1. Bilateral pleural effusions slightly greater on the left than the  right. This may be congestive failure.        This report was finalized on 01/23/2021 11:00 by Dr. Oskar Yeh MD.            Hospital Course:   This 89-year-old white female presents to the ER by EMS after experiencing a loss of consciousness and a fall.  The patient has been extremely lethargic since arriving.  The patient has a history of recurrent urinary tract infections, CAD, atrial fibrillation, chronic anticoagulation, chronic diastolic heart failure, BALDEMAR, diabetes, hypertension, hyperlipidemia and  hearing impairment.  The patient is usually verbal but is nonverbal today.  She is able to mumble on occasion.  She has recently been hospitalized 2-3 times at [facility] due to GI bleeding, hypotension, hypovolemia, enterocolitis and recurrent urinary tract infection.  The patient's daughter notes increasing confusion over the past few days.  During her hospitalization at [facility], diuretics were held but had recently been restarted per her primary care physician.  She has had some previous diarrhea but over the past several days stools have significantly decreased.  Had a telehealth visit with GI noting that the patient's GI symptoms were improving significantly.  The patient apparently was placed on bedside commode today and suddenly slumped over and fell backwards.  Urine was noted to be dark in coloration at the time.     Work-up in the emergency department revealed a blood pressure of 69/44.  Blood gas shows pH 7.164 with troponin 0 0.064.  Urine shows moderate blood, positive nitrite with trace bacteria and 7-12 renal epithelial cells.  Creatinine 1.58, albumin quite low at 2.3.  BNP was extremely elevated at 34,485.  Hemoglobin 9.7 hematocrit 28.6.  Procalcitonin normal at 0.23.  Lactate normal at 1.3     I had a long discussion with the patient's daughter and son in the emergency department.  They are quite understanding.  They realize that the patient is unlikely to ever be independent again.  They know that she does not want to exist in a nursing facility.  They also understand that she has had a significant decline over the past 2 to 3 months.  The patient herself is indicated that she just wants to be made comfortable.  The patient's family is also in agreement with transition to comfort measures care.    PLAN:   Admit to the medical surgical floor with comfort measures care  Palliative care set placed  Morphine drip ordered    On the day after admission at the time of my evaluation, the patient  appeared to be comfortable.  The patient's family was present in the room during the visit.  The patient remained without discomfort or air hunger and comfort measures care continue to be delivered until the patient passed away comfortably at 1852 hrs. on that same day.    Electronically signed by eSsar Bellamy DO, 02/01/21, 08:22 CST.    Time: less than 30 minutes

## 2021-02-11 PROBLEM — N39.0 UTI (URINARY TRACT INFECTION): Status: RESOLVED | Noted: 2021-01-01 | Resolved: 2021-02-11

## 2022-06-10 NOTE — CARE COORDINATION
I called [facility] ([facility]) [facility]. Spoke with Steve Bourne. She states that she has spoken to the family and has explained to them that she needed to come to the [address] [place] [address] first before they would admit her. I called patient's son and he stated yes that they had called him. No other needs identified.   Electronically signed by Gwendolyn Silva RN, BSN on 1/18/2021 at 1:06 PM Low-fat low-cholesterol diet  Statin  Fasting lipid panel

## (undated) DEVICE — FORCEPS BX L L240CM DIA2.4MM RAD JAW 4 HOT FOR POLYP DISP

## (undated) DEVICE — FORCEPS BX L240CM JAW DIA2.4MM ORNG L CAP W/ NDL DISP RAD

## (undated) DEVICE — ENDO KIT,LOURDES HOSPITAL: Brand: MEDLINE INDUSTRIES, INC.

## (undated) DEVICE — GLOVE ORTHO 8   MSG9480